# Patient Record
Sex: FEMALE | Race: OTHER | Employment: FULL TIME | URBAN - METROPOLITAN AREA
[De-identification: names, ages, dates, MRNs, and addresses within clinical notes are randomized per-mention and may not be internally consistent; named-entity substitution may affect disease eponyms.]

---

## 2017-02-26 ENCOUNTER — APPOINTMENT (OUTPATIENT)
Dept: ULTRASOUND IMAGING | Age: 22
End: 2017-02-26
Attending: EMERGENCY MEDICINE
Payer: SELF-PAY

## 2017-02-26 ENCOUNTER — HOSPITAL ENCOUNTER (EMERGENCY)
Age: 22
Discharge: HOME | End: 2017-02-26
Attending: EMERGENCY MEDICINE
Payer: SELF-PAY

## 2017-02-26 VITALS
SYSTOLIC BLOOD PRESSURE: 109 MMHG | RESPIRATION RATE: 18 BRPM | DIASTOLIC BLOOD PRESSURE: 57 MMHG | OXYGEN SATURATION: 100 % | HEART RATE: 90 BPM | HEIGHT: 63 IN | WEIGHT: 227.07 LBS | BODY MASS INDEX: 40.23 KG/M2 | TEMPERATURE: 98.1 F

## 2017-02-26 DIAGNOSIS — N83.202 CYST OF LEFT OVARY: Primary | ICD-10-CM

## 2017-02-26 LAB
ALBUMIN SERPL BCP-MCNC: 3.3 G/DL (ref 3.5–5)
ALBUMIN/GLOB SERPL: 0.8 {RATIO} (ref 1.1–2.2)
ALP SERPL-CCNC: 104 U/L (ref 45–117)
ALT SERPL-CCNC: 15 U/L (ref 12–78)
ANION GAP BLD CALC-SCNC: 11 MMOL/L (ref 5–15)
APPEARANCE UR: ABNORMAL
AST SERPL W P-5'-P-CCNC: 14 U/L (ref 15–37)
BACTERIA URNS QL MICRO: ABNORMAL /HPF
BASOPHILS # BLD AUTO: 0 K/UL (ref 0–0.1)
BASOPHILS # BLD: 0 % (ref 0–1)
BILIRUB SERPL-MCNC: 0.4 MG/DL (ref 0.2–1)
BILIRUB UR QL: NEGATIVE
BUN SERPL-MCNC: 6 MG/DL (ref 6–20)
BUN/CREAT SERPL: 9 (ref 12–20)
CALCIUM SERPL-MCNC: 8.4 MG/DL (ref 8.5–10.1)
CHLORIDE SERPL-SCNC: 105 MMOL/L (ref 97–108)
CO2 SERPL-SCNC: 26 MMOL/L (ref 21–32)
COLOR UR: ABNORMAL
CREAT SERPL-MCNC: 0.69 MG/DL (ref 0.55–1.02)
EOSINOPHIL # BLD: 0.8 K/UL (ref 0–0.4)
EOSINOPHIL NFR BLD: 8 % (ref 0–7)
EPITH CASTS URNS QL MICRO: ABNORMAL /LPF
ERYTHROCYTE [DISTWIDTH] IN BLOOD BY AUTOMATED COUNT: 15.9 % (ref 11.5–14.5)
GLOBULIN SER CALC-MCNC: 4.1 G/DL (ref 2–4)
GLUCOSE SERPL-MCNC: 88 MG/DL (ref 65–100)
GLUCOSE UR STRIP.AUTO-MCNC: NEGATIVE MG/DL
HCG UR QL: NEGATIVE
HCT VFR BLD AUTO: 32.4 % (ref 35–47)
HGB BLD-MCNC: 10.2 G/DL (ref 11.5–16)
HGB UR QL STRIP: NEGATIVE
KETONES UR QL STRIP.AUTO: NEGATIVE MG/DL
LEUKOCYTE ESTERASE UR QL STRIP.AUTO: NEGATIVE
LYMPHOCYTES # BLD AUTO: 19 % (ref 12–49)
LYMPHOCYTES # BLD: 1.9 K/UL (ref 0.8–3.5)
MCH RBC QN AUTO: 24.2 PG (ref 26–34)
MCHC RBC AUTO-ENTMCNC: 31.5 G/DL (ref 30–36.5)
MCV RBC AUTO: 77 FL (ref 80–99)
MONOCYTES # BLD: 0.5 K/UL (ref 0–1)
MONOCYTES NFR BLD AUTO: 6 % (ref 5–13)
NEUTS SEG # BLD: 6.6 K/UL (ref 1.8–8)
NEUTS SEG NFR BLD AUTO: 67 % (ref 32–75)
NITRITE UR QL STRIP.AUTO: NEGATIVE
PH UR STRIP: 6 [PH] (ref 5–8)
PLATELET # BLD AUTO: 481 K/UL (ref 150–400)
POTASSIUM SERPL-SCNC: 3.7 MMOL/L (ref 3.5–5.1)
PROT SERPL-MCNC: 7.4 G/DL (ref 6.4–8.2)
PROT UR STRIP-MCNC: NEGATIVE MG/DL
RBC # BLD AUTO: 4.21 M/UL (ref 3.8–5.2)
RBC #/AREA URNS HPF: ABNORMAL /HPF (ref 0–5)
SODIUM SERPL-SCNC: 142 MMOL/L (ref 136–145)
SP GR UR REFRACTOMETRY: 1.02 (ref 1–1.03)
UA: UC IF INDICATED,UAUC: ABNORMAL
UROBILINOGEN UR QL STRIP.AUTO: 1 EU/DL (ref 0.2–1)
WBC # BLD AUTO: 9.8 K/UL (ref 3.6–11)
WBC URNS QL MICRO: ABNORMAL /HPF (ref 0–4)

## 2017-02-26 PROCEDURE — 87086 URINE CULTURE/COLONY COUNT: CPT | Performed by: EMERGENCY MEDICINE

## 2017-02-26 PROCEDURE — 36415 COLL VENOUS BLD VENIPUNCTURE: CPT | Performed by: EMERGENCY MEDICINE

## 2017-02-26 PROCEDURE — 99283 EMERGENCY DEPT VISIT LOW MDM: CPT

## 2017-02-26 PROCEDURE — 81025 URINE PREGNANCY TEST: CPT | Performed by: EMERGENCY MEDICINE

## 2017-02-26 PROCEDURE — 81001 URINALYSIS AUTO W/SCOPE: CPT | Performed by: EMERGENCY MEDICINE

## 2017-02-26 PROCEDURE — 80053 COMPREHEN METABOLIC PANEL: CPT | Performed by: EMERGENCY MEDICINE

## 2017-02-26 PROCEDURE — 85025 COMPLETE CBC W/AUTO DIFF WBC: CPT | Performed by: EMERGENCY MEDICINE

## 2017-02-26 PROCEDURE — 76830 TRANSVAGINAL US NON-OB: CPT

## 2017-02-26 PROCEDURE — 76856 US EXAM PELVIC COMPLETE: CPT

## 2017-02-26 RX ORDER — IBUPROFEN 800 MG/1
800 TABLET ORAL
Qty: 20 TAB | Refills: 0 | Status: SHIPPED | OUTPATIENT
Start: 2017-02-26 | End: 2017-02-26

## 2017-02-26 RX ORDER — IBUPROFEN 800 MG/1
800 TABLET ORAL
Qty: 20 TAB | Refills: 0 | Status: SHIPPED | OUTPATIENT
Start: 2017-02-26 | End: 2017-03-05

## 2017-02-26 RX ORDER — HYDROCODONE BITARTRATE AND ACETAMINOPHEN 5; 325 MG/1; MG/1
1 TABLET ORAL
Qty: 10 TAB | Refills: 0 | Status: SHIPPED | OUTPATIENT
Start: 2017-02-26 | End: 2017-04-12 | Stop reason: ALTCHOICE

## 2017-02-26 NOTE — ED NOTES
Dr. Karissa German reviewed discharge instructions with the patient. The patient verbalized understanding. All questions and concerns were addressed. The patient declined a wheelchair and is discharged ambulatory in the care of family members with instructions and prescriptions in hand. Pt is alert and oriented x 4. Respirations are clear and unlabored.

## 2017-02-26 NOTE — ED PROVIDER NOTES
HPI Comments: Rachael Ventura is a 25 y.o. female with history significant for ovarian cysts presenting ambulatory to [facility] ED with c/o sudden onset of pain to the abdomen. Per pt, she was at the end of her shift at work when she experienced pain to the left suprapubic region with a moderate 6/10 intensity. Pt reports history of ovarian cysts back when she was pregnant but informs that the pain associated to the cyst disappeared in 09/2016 when she delivered her baby. Pt informs her OBGYN stated that the pain associated with the cyst may disappear once she delivered and would be of little concern. Pt informs the current pain presents in the same manner to the left suprapubic region as her previous onset of ovarian cyst pain. Pt states she currently is in no pain informing that the pain resolved en route with no interventions. Pt expresses concern of returned ovarian cyst. Pt specifically denies any nausea, vomiting, fevers, chills, urinary complications, chest pain, or SOB. PCP: None    PSHx: tonsillectomy, adenoidectomy  Social Hx: - EtOH; - Smoker; - Illicit Drugs    There are no other changes, complaints or physical findings at this time. The history is provided by the patient. Past Medical History:   Diagnosis Date    Abdominal pain, other specified site     Keratosis pilaris 10/1/2009    Otitis media      Past Surgical History:   Procedure Laterality Date    HX ADENOIDECTOMY      HX HEENT      tubal/tonsils and adnoids    HX TONSILLECTOMY      HX TYMPANOSTOMY       History reviewed. No pertinent family history. Social History     Social History    Marital status: SINGLE     Spouse name: N/A    Number of children: N/A    Years of education: N/A     Occupational History    Not on file.      Social History Main Topics    Smoking status: Never Smoker    Smokeless tobacco: Never Used    Alcohol use No    Drug use: No    Sexual activity: No     Other Topics Concern    Not on file Social History Narrative     ALLERGIES: Review of patient's allergies indicates no known allergies. Review of Systems   Constitutional: Negative for chills and fever. HENT: Negative for congestion, rhinorrhea, sneezing and sore throat. Eyes: Negative for redness and visual disturbance. Respiratory: Negative for shortness of breath. Cardiovascular: Negative for leg swelling. Gastrointestinal: Positive for abdominal pain. Negative for nausea and vomiting. Genitourinary: Negative for difficulty urinating and frequency. Musculoskeletal: Negative for back pain, myalgias and neck stiffness. Skin: Negative for rash. Neurological: Negative for dizziness, syncope, weakness and headaches. Hematological: Negative for adenopathy. Patient Vitals for the past 12 hrs:   Temp Pulse Resp BP SpO2   02/26/17 1754 - 90 18 109/57 100 %   02/26/17 1519 - 98 - 107/64 99 %   02/26/17 1405 98.1 °F (36.7 °C) 94 18 144/81 100 %     Physical Exam   Constitutional: She is oriented to person, place, and time. She appears well-developed and well-nourished. HENT:   Head: Normocephalic and atraumatic. Mouth/Throat: Oropharynx is clear and moist.   Eyes: Conjunctivae and EOM are normal.   Neck: Normal range of motion and full passive range of motion without pain. Neck supple. Cardiovascular: Normal rate, regular rhythm, S1 normal, S2 normal, normal heart sounds, intact distal pulses and normal pulses. No murmur heard. Pulmonary/Chest: Effort normal and breath sounds normal. No respiratory distress. She has no wheezes. Abdominal: Soft. Normal appearance and bowel sounds are normal. She exhibits no distension. There is tenderness (L suprapubic/pelvic). There is no rebound. Musculoskeletal: Normal range of motion. Neurological: She is alert and oriented to person, place, and time. She has normal strength. Skin: Skin is warm, dry and intact. No rash noted.    Psychiatric: She has a normal mood and affect. Her speech is normal and behavior is normal. Judgment and thought content normal.   Nursing note and vitals reviewed. MDM  Number of Diagnoses or Management Options  Cyst of left ovary:   Diagnosis management comments: DDx: constipation, ovarian cysts, ovarian torsion       Amount and/or Complexity of Data Reviewed  Clinical lab tests: ordered and reviewed  Tests in the radiology section of CPT®: ordered and reviewed  Review and summarize past medical records: yes    Patient Progress  Patient progress: stable    ED Course     Procedures     Progress Note:   6:05 PM  Pt called back to inquire about preferred pharmacy as the prescription to the one on file was not able to be e-prescribed. Written by Ada Yo ED Scribe, as dictated by Elfego Zamarripa MD.     Pt states that her pain is better. Informed of negative results. Will discharge with OB follow up. Pt agrees with plan.     LABORATORY TESTS:  Recent Results (from the past 12 hour(s))   HCG URINE, QL    Collection Time: 02/26/17  2:18 PM   Result Value Ref Range    HCG urine, Ql. NEGATIVE  NEG     URINALYSIS W/ REFLEX CULTURE    Collection Time: 02/26/17  2:18 PM   Result Value Ref Range    Color YELLOW/STRAW      Appearance CLOUDY (A) CLEAR      Specific gravity 1.023 1.003 - 1.030      pH (UA) 6.0 5.0 - 8.0      Protein NEGATIVE  NEG mg/dL    Glucose NEGATIVE  NEG mg/dL    Ketone NEGATIVE  NEG mg/dL    Bilirubin NEGATIVE  NEG      Blood NEGATIVE  NEG      Urobilinogen 1.0 0.2 - 1.0 EU/dL    Nitrites NEGATIVE  NEG      Leukocyte Esterase NEGATIVE  NEG      WBC 0-4 0 - 4 /hpf    RBC 0-5 0 - 5 /hpf    Epithelial cells FEW FEW /lpf    Bacteria 1+ (A) NEG /hpf    UA:UC IF INDICATED URINE CULTURE ORDERED (A) CNI     METABOLIC PANEL, COMPREHENSIVE    Collection Time: 02/26/17  3:05 PM   Result Value Ref Range    Sodium 142 136 - 145 mmol/L    Potassium 3.7 3.5 - 5.1 mmol/L    Chloride 105 97 - 108 mmol/L    CO2 26 21 - 32 mmol/L    Anion gap 11 5 - 15 mmol/L    Glucose 88 65 - 100 mg/dL    BUN 6 6 - 20 MG/DL    Creatinine 0.69 0.55 - 1.02 MG/DL    BUN/Creatinine ratio 9 (L) 12 - 20      GFR est AA >60 >60 ml/min/1.73m2    GFR est non-AA >60 >60 ml/min/1.73m2    Calcium 8.4 (L) 8.5 - 10.1 MG/DL    Bilirubin, total 0.4 0.2 - 1.0 MG/DL    ALT (SGPT) 15 12 - 78 U/L    AST (SGOT) 14 (L) 15 - 37 U/L    Alk. phosphatase 104 45 - 117 U/L    Protein, total 7.4 6.4 - 8.2 g/dL    Albumin 3.3 (L) 3.5 - 5.0 g/dL    Globulin 4.1 (H) 2.0 - 4.0 g/dL    A-G Ratio 0.8 (L) 1.1 - 2.2     CBC WITH AUTOMATED DIFF    Collection Time: 02/26/17  3:05 PM   Result Value Ref Range    WBC 9.8 3.6 - 11.0 K/uL    RBC 4.21 3.80 - 5.20 M/uL    HGB 10.2 (L) 11.5 - 16.0 g/dL    HCT 32.4 (L) 35.0 - 47.0 %    MCV 77.0 (L) 80.0 - 99.0 FL    MCH 24.2 (L) 26.0 - 34.0 PG    MCHC 31.5 30.0 - 36.5 g/dL    RDW 15.9 (H) 11.5 - 14.5 %    PLATELET 893 (H) 305 - 400 K/uL    NEUTROPHILS 67 32 - 75 %    LYMPHOCYTES 19 12 - 49 %    MONOCYTES 6 5 - 13 %    EOSINOPHILS 8 (H) 0 - 7 %    BASOPHILS 0 0 - 1 %    ABS. NEUTROPHILS 6.6 1.8 - 8.0 K/UL    ABS. LYMPHOCYTES 1.9 0.8 - 3.5 K/UL    ABS. MONOCYTES 0.5 0.0 - 1.0 K/UL    ABS. EOSINOPHILS 0.8 (H) 0.0 - 0.4 K/UL    ABS. BASOPHILS 0.0 0.0 - 0.1 K/UL     IMAGING RESULTS:  Medical indication: Left suprapubic pain, history of ovarian cyst.     Transabdominal and transvaginal pelvic sonography performed. The uterus measured  9.7 x 2.7 x 4 cm, the stripe is 7 mm, no mass. The right ovary measures 2.8 x  1.9 x 2.9 cm and contains follicles the largest one measuring 1.2 cm. There is a  cystic lesion in the left adnexa likely arising from the left ovary measured 13  x 7 x 10 cm. No ovarian parenchyma is delineated. Trace of free fluid.     IMPRESSION  impression: Large left adnexal cystic lesion. Medical indication: Left suprapubic pain, history of ovarian cyst.     Transabdominal and transvaginal pelvic sonography performed.  The uterus measured  9.7 x 2.7 x 4 cm, the stripe is 7 mm, no mass. The right ovary measures 2.8 x  1.9 x 2.9 cm and contains follicles the largest one measuring 1.2 cm. There is a  cystic lesion in the left adnexa likely arising from the left ovary measured 13  x 7 x 10 cm. No ovarian parenchyma is delineated. Trace of free fluid.     IMPRESSION  impression: Large left adnexal cystic lesion.         IMPRESSION:  1. Cyst of left ovary      PLAN:  1. Discharge Medication List as of 2/26/2017  5:46 PM      START taking these medications    Details   HYDROcodone-acetaminophen (NORCO) 5-325 mg per tablet Take 1 Tab by mouth every four (4) hours as needed for Pain. Max Daily Amount: 6 Tabs., Print, Disp-10 Tab, R-0      ibuprofen (MOTRIN) 800 mg tablet Take 1 Tab by mouth every six (6) hours as needed for Pain for up to 7 days. , Normal, Disp-20 Tab, R-0           2. Follow-up Information     Follow up With Details Comments Contact Info    Gracie Borrero MD Call or your OB at 14 [facility]  [address] [address] [phone]      [facility] EMERGENCY DEPT  As needed, If symptoms worsen [address] [place] [address]  [phone]        Return to ED if worse     DISCHARGE NOTE:    5:44 PM  The patient is ready for discharge. The patient signs, symptoms, diagnosis, and discharge instructions have been discussed and the patient has conveyed their understanding. The patient is to follow-up as reccommended or returned to the ER should their symptoms worsen. Plan has been discussed and patient is in agreement. This note is prepared by Dimas Mcmillan acting as Scribe for Broderick Barton MD.    Broderick Barton MD: This scribe's documentation has been prepared under my direction and personally reviewed by me in its entirety. I confirm that the note above accurately reflects all work, treatment procedures and medical decision makings performed by me.

## 2017-02-26 NOTE — LETTER
NOTIFICATION RETURN TO WORK 
 
2/26/2017 5:56 PM 
 
Ms. Tevin Holguin [address] [phone] To Whom It May Concern: 
 
Tevin Holguin is currently under the care of [facility] EMERGENCY DEPT. She will return to work on: 02/28/2017 If there are questions or concerns please have the patient contact our office.  
 
 
 
Sincerely, 
 
 
 
 
 
Marisol Hsu MD

## 2017-02-26 NOTE — ED NOTES
Pt ambulatory from triage with c/o episode of LLQ abdominal pain starting earlier today. Pt currently denies pain. Pt states PMH of L ovarian cyst. Pt denies n/v/d, urinary s/s and all other complaints. Pt in position of comfort with no signs of acute distress noted.

## 2017-02-26 NOTE — DISCHARGE INSTRUCTIONS
Functional Ovarian Cyst: Care Instructions  Your Care Instructions    A functional ovarian cyst is a sac that forms on the surface of a woman's ovary during ovulation. The sac holds a maturing egg. Usually the sac goes away after the egg is released. But if the egg is not released, or if the sac closes up after the egg is released, the sac can swell up with fluid and form a cyst.  Functional ovarian cysts are different than ovarian growths caused by other problems, such as cancer. Most functional ovarian cysts cause no symptoms and go away on their own. Some cause mild pain. Others can cause severe pain when they rupture or bleed. Follow-up care is a key part of your treatment and safety. Be sure to make and go to all appointments, and call your doctor if you are having problems. It's also a good idea to know your test results and keep a list of the medicines you take. How can you care for yourself at home? · Use heat, such as a hot water bottle, a heating pad set on low, or a warm bath, to relax tense muscles and relieve cramping. · Take pain medicines exactly as directed. ¨ If the doctor gave you a prescription medicine for pain, take it as prescribed. ¨ If you are not taking a prescription pain medicine, ask your doctor if you can take an over-the-counter medicine. · Avoid constipation. Make sure you drink enough fluids and include fruits, vegetables, and fiber in your diet each day. Constipation does not cause ovarian cysts, but it may make your pelvic pain worse. When should you call for help? Call 911 anytime you think you may need emergency care. For example, call if:  · You passed out (lost consciousness). · You have sudden, severe pain in your belly or your pelvis. Call your doctor now or seek immediate medical care if:  · You have new belly or pelvic pain, or your pain gets worse. · You have severe vaginal bleeding.  This means that you are soaking through your usual pads or tampons each hour for 2 or more hours. · You are dizzy or lightheaded, or you feel like you may faint. · You have pain or bleeding during or after sex. Watch closely for changes in your health, and be sure to contact your doctor if:  · Your pain keeps you from doing the things that you enjoy. · You do not get better as expected. Where can you learn more? Go to http://sohail-geoffrey.info/. Enter R383 in the search box to learn more about \"Functional Ovarian Cyst: Care Instructions. \"  Current as of: February 25, 2016  Content Version: 11.1  © 3882-4848 [employer]. Care instructions adapted under license by [employer] (which disclaims liability or warranty for this information). If you have questions about a medical condition or this instruction, always ask your healthcare professional. [address] any warranty or liability for your use of this information.

## 2017-02-26 NOTE — Clinical Note
Local [facility] Physicians [address] [place] [address] Physicians [phone] Zheng Olmstead, MD Dick Alicea, MD Mello Wilder MD 
 
[facility] [phone] Odilia Lee, P Lise Schlatter, MD Sung Roe, MD Althia Perkins, MD [address] [phone] MD Jacki Patel MD  
 
[facility] [phone] Jj Stoddard, MD Brittany Barker, MD Roger Marcano, MD Krupa Arreola, MD ABRAMS [facility] [phone] Luz Maria Fraga, MD Damon Bedolla, MD Melvin Gongora, NP [address] [phone] Saintair Aburto, MD Kyle Cardoso, MD Gregoira Enriquez, MD Arlyn Cox, MD Ingrid Samuel, MD Jonn Hernandez, MD [facility], [address] [facility] [facility] [phone] Howard Dugan MD 
 
[facility] [phone] MD Lon Chahal, NP Gillie Sandifer, MD Rebecca Gilliland, MD Delbert Zee MD [facility] [place] [phone] Linnea Gunn, MD Burgess Roberto, P Deonna Jc, KISHAN Latham, MD Simone Chatman, MD Garland Dorantes MD EPHRAIM [facility] [phone] MD Carina Matthews, MD Natalia Babin MD Kendell Levering, MD  
 
[facility] [phone] MD Mesfin Gambino MD 
 
 [facility] [phone] MD Donnell Ventura MD Jacelyn Shropshire, MD  
 
[facility] [phone] MD Emeka Giang, MD Leim Butler, MD Elysa Hides, MD Raenette Combes, MD Darylene Deaner, NP Holger Hernández MD  
 
[phone]  [phone] MD Ibis Nam MD  
 
[address]102 [facility] MD Sarahy Last, ALEX Rojas, ALEX Hopkins MD Fortunato Gey, KISHAN [facility] [place], [facility] For adult and child immunizations, family planning, TB screening, STD testing and women's health services. [facility]: 
[place]  [phone] [employer]  [phone] [facility]  [phone] [place]   [phone] Eleonore Shade   [phone] [place]: 
[facility]  [phone] [place] [phone] [place]  [phone] [address] For primary care servi gavin, woman and child wellness, and some clinics providing specialty care. [facility] -- [facility].  
[address] [place] [address]  [phone] [facility]EN[facility] 200 [facility] [phone] [address] [place] [address]   [phone] [address] [place] [address] Drive [address] [place] [address]    [phone] [facility] [address]  [phone] [address]  [phone] Cinthya Sesay [facility] [address] [facility], [address] [place] [address] [facility]: 
[facility] [address], [address] [phone] [facility], [address] [phone] [place] [address] [phone] [facility] [address] [employer] [employer] [address], [phone] 3550 [facility] ([email or web address]) [phone] Sexual Health/Woman Wellness Clinics For STD/HIV testing and treatment, pregnancy testing and services, men's health, birth cont rol services, LGBT services, and hepatitis/HPV vaccine services. [facility]hinav [facility] Melodie for [place] [employer] [address] [facility] [phone] [facility] of [address] Isabell Hernandez [phone] [address] Hernandez [phone] [facility]' 401 [facility], 5th floor [phone] [facility] of 1065 [facility] 427 [facility] [address] [facility] [address] Brand[place] Seun [phone] Specialty Service Clinics 4200 [facility] [phone] 6655 [facility]   [phone] [place]   [phone] Women, Infant and Children's Services:  
[address] [phone] [address] [phone] 6161 [facility]- [phone] [address] [facility] Psychiatry     [phone] [facility]5 [facility]   [phone] 56 [facility] [phone] Miscellaneous: Thank you for allowing us to provide y ou with excellent care today. We hope we addressed all of your concerns and needs. We strive to provide excellent quality care in the Emergency Department. Please rate us as excellent, as anything less than excellent does not meet our expectations. If  you feel that you have not received excellent quality care or timely care, please ask to speak to the nurse manager. Please choose us in the future for your continued health care needs. The exam and treatment you received in the Emergency Department  were for an urgent problem and are not intended as complete care. It is important that you follow up with a doctor, nurse practitioner, or physician assistant for ongoing care.  If your symptoms become worse or you do not improve as expected and you are u ham[place] to reach your usual health care provider, you should return to the Emergency Department. We are available 24 hours a day. Take this sheet with you when you go to your follow-up visit. If you have any problem arranging the follow-up visit, conta ct the Emergency Department immediately. If a prescription has been provided, please have it filled as soon as possible to avoid a delay in treatment. Read the entire medication instruction sheet provided to you by the pharmacy. If you have any questio ns or reservations about taking the medication due to side effects or interactions with other medications please call the ER or your primary care physician. Take this sheet with you when you go to your follow-up visit. Make an appointment with yo  family doctor or the physician you were referred to for follow-up of this visit, as this is mandatory follow-up. Return to the ER if you are unable to be seen or if you are unable to be seen in a timely manner. If you have any problem arranging th e follow-up visit, contact the Emergency Department immediately.

## 2017-02-28 LAB
BACTERIA SPEC CULT: NORMAL
CC UR VC: NORMAL
SERVICE CMNT-IMP: NORMAL

## 2017-04-03 ENCOUNTER — HOSPITAL ENCOUNTER (OUTPATIENT)
Age: 22
Setting detail: OBSERVATION
Discharge: HOME | End: 2017-04-06
Attending: EMERGENCY MEDICINE | Admitting: OBSTETRICS & GYNECOLOGY
Payer: SELF-PAY

## 2017-04-03 ENCOUNTER — APPOINTMENT (OUTPATIENT)
Dept: ULTRASOUND IMAGING | Age: 22
End: 2017-04-03
Attending: PHYSICIAN ASSISTANT
Payer: SELF-PAY

## 2017-04-03 DIAGNOSIS — R10.2 PELVIC PAIN: ICD-10-CM

## 2017-04-03 DIAGNOSIS — N83.202 LEFT OVARIAN CYST: Primary | ICD-10-CM

## 2017-04-03 DIAGNOSIS — N83.519 OVARIAN TORSION: ICD-10-CM

## 2017-04-03 LAB
ALBUMIN SERPL BCP-MCNC: 3.7 G/DL (ref 3.5–5)
ALBUMIN/GLOB SERPL: 0.9 {RATIO} (ref 1.1–2.2)
ALP SERPL-CCNC: 91 U/L (ref 45–117)
ALT SERPL-CCNC: 16 U/L (ref 12–78)
ANION GAP BLD CALC-SCNC: 11 MMOL/L (ref 5–15)
APPEARANCE UR: ABNORMAL
AST SERPL W P-5'-P-CCNC: 17 U/L (ref 15–37)
BACTERIA URNS QL MICRO: ABNORMAL /HPF
BASOPHILS # BLD AUTO: 0 K/UL (ref 0–0.1)
BASOPHILS # BLD: 0 % (ref 0–1)
BILIRUB SERPL-MCNC: 0.4 MG/DL (ref 0.2–1)
BILIRUB UR QL: NEGATIVE
BUN SERPL-MCNC: 12 MG/DL (ref 6–20)
BUN/CREAT SERPL: 16 (ref 12–20)
CALCIUM SERPL-MCNC: 8.6 MG/DL (ref 8.5–10.1)
CHLORIDE SERPL-SCNC: 103 MMOL/L (ref 97–108)
CLUE CELLS VAG QL WET PREP: NORMAL
CO2 SERPL-SCNC: 24 MMOL/L (ref 21–32)
COLOR UR: ABNORMAL
CREAT SERPL-MCNC: 0.75 MG/DL (ref 0.55–1.02)
EOSINOPHIL # BLD: 0.1 K/UL (ref 0–0.4)
EOSINOPHIL NFR BLD: 0 % (ref 0–7)
EPITH CASTS URNS QL MICRO: ABNORMAL /LPF
ERYTHROCYTE [DISTWIDTH] IN BLOOD BY AUTOMATED COUNT: 15.6 % (ref 11.5–14.5)
GLOBULIN SER CALC-MCNC: 3.9 G/DL (ref 2–4)
GLUCOSE SERPL-MCNC: 106 MG/DL (ref 65–100)
GLUCOSE UR STRIP.AUTO-MCNC: NEGATIVE MG/DL
HCG UR QL: NEGATIVE
HCT VFR BLD AUTO: 33.6 % (ref 35–47)
HGB BLD-MCNC: 10.8 G/DL (ref 11.5–16)
HGB UR QL STRIP: NEGATIVE
KETONES UR QL STRIP.AUTO: NEGATIVE MG/DL
KOH PREP SPEC: NORMAL
LACTATE SERPL-SCNC: 1.6 MMOL/L (ref 0.4–2)
LEUKOCYTE ESTERASE UR QL STRIP.AUTO: NEGATIVE
LYMPHOCYTES # BLD AUTO: 5 % (ref 12–49)
LYMPHOCYTES # BLD: 0.9 K/UL (ref 0.8–3.5)
MCH RBC QN AUTO: 24.4 PG (ref 26–34)
MCHC RBC AUTO-ENTMCNC: 32.1 G/DL (ref 30–36.5)
MCV RBC AUTO: 76 FL (ref 80–99)
MONOCYTES # BLD: 0.3 K/UL (ref 0–1)
MONOCYTES NFR BLD AUTO: 2 % (ref 5–13)
NEUTS SEG # BLD: 17.1 K/UL (ref 1.8–8)
NEUTS SEG NFR BLD AUTO: 93 % (ref 32–75)
NITRITE UR QL STRIP.AUTO: NEGATIVE
PH UR STRIP: 7 [PH] (ref 5–8)
PLATELET # BLD AUTO: 462 K/UL (ref 150–400)
POTASSIUM SERPL-SCNC: 3.9 MMOL/L (ref 3.5–5.1)
PROT SERPL-MCNC: 7.6 G/DL (ref 6.4–8.2)
PROT UR STRIP-MCNC: NEGATIVE MG/DL
RBC # BLD AUTO: 4.42 M/UL (ref 3.8–5.2)
RBC #/AREA URNS HPF: ABNORMAL /HPF (ref 0–5)
SERVICE CMNT-IMP: NORMAL
SODIUM SERPL-SCNC: 138 MMOL/L (ref 136–145)
SP GR UR REFRACTOMETRY: 1.02 (ref 1–1.03)
T VAGINALIS VAG QL WET PREP: NORMAL
UROBILINOGEN UR QL STRIP.AUTO: 1 EU/DL (ref 0.2–1)
WBC # BLD AUTO: 18.5 K/UL (ref 3.6–11)
WBC URNS QL MICRO: ABNORMAL /HPF (ref 0–4)

## 2017-04-03 PROCEDURE — 74011250636 HC RX REV CODE- 250/636: Performed by: PHYSICIAN ASSISTANT

## 2017-04-03 PROCEDURE — 87210 SMEAR WET MOUNT SALINE/INK: CPT | Performed by: PHYSICIAN ASSISTANT

## 2017-04-03 PROCEDURE — 87077 CULTURE AEROBIC IDENTIFY: CPT | Performed by: PHYSICIAN ASSISTANT

## 2017-04-03 PROCEDURE — 36415 COLL VENOUS BLD VENIPUNCTURE: CPT | Performed by: PHYSICIAN ASSISTANT

## 2017-04-03 PROCEDURE — 99285 EMERGENCY DEPT VISIT HI MDM: CPT

## 2017-04-03 PROCEDURE — 96372 THER/PROPH/DIAG INJ SC/IM: CPT

## 2017-04-03 PROCEDURE — 99218 HC RM OBSERVATION: CPT

## 2017-04-03 PROCEDURE — 96361 HYDRATE IV INFUSION ADD-ON: CPT

## 2017-04-03 PROCEDURE — 74011250637 HC RX REV CODE- 250/637: Performed by: PHYSICIAN ASSISTANT

## 2017-04-03 PROCEDURE — 87086 URINE CULTURE/COLONY COUNT: CPT | Performed by: PHYSICIAN ASSISTANT

## 2017-04-03 PROCEDURE — 87491 CHLMYD TRACH DNA AMP PROBE: CPT | Performed by: PHYSICIAN ASSISTANT

## 2017-04-03 PROCEDURE — 81001 URINALYSIS AUTO W/SCOPE: CPT | Performed by: PHYSICIAN ASSISTANT

## 2017-04-03 PROCEDURE — 76856 US EXAM PELVIC COMPLETE: CPT

## 2017-04-03 PROCEDURE — 83605 ASSAY OF LACTIC ACID: CPT | Performed by: PHYSICIAN ASSISTANT

## 2017-04-03 PROCEDURE — 81025 URINE PREGNANCY TEST: CPT

## 2017-04-03 PROCEDURE — 96374 THER/PROPH/DIAG INJ IV PUSH: CPT

## 2017-04-03 PROCEDURE — 96375 TX/PRO/DX INJ NEW DRUG ADDON: CPT

## 2017-04-03 PROCEDURE — 74011250636 HC RX REV CODE- 250/636: Performed by: OBSTETRICS & GYNECOLOGY

## 2017-04-03 PROCEDURE — 80053 COMPREHEN METABOLIC PANEL: CPT | Performed by: PHYSICIAN ASSISTANT

## 2017-04-03 PROCEDURE — 87186 SC STD MICRODIL/AGAR DIL: CPT | Performed by: PHYSICIAN ASSISTANT

## 2017-04-03 PROCEDURE — 85025 COMPLETE CBC W/AUTO DIFF WBC: CPT | Performed by: PHYSICIAN ASSISTANT

## 2017-04-03 PROCEDURE — 96376 TX/PRO/DX INJ SAME DRUG ADON: CPT

## 2017-04-03 RX ORDER — HYDROMORPHONE HYDROCHLORIDE 1 MG/ML
1 INJECTION, SOLUTION INTRAMUSCULAR; INTRAVENOUS; SUBCUTANEOUS
Status: DISCONTINUED | OUTPATIENT
Start: 2017-04-03 | End: 2017-04-04

## 2017-04-03 RX ORDER — HYDROMORPHONE HYDROCHLORIDE 1 MG/ML
1 INJECTION, SOLUTION INTRAMUSCULAR; INTRAVENOUS; SUBCUTANEOUS
Status: COMPLETED | OUTPATIENT
Start: 2017-04-03 | End: 2017-04-03

## 2017-04-03 RX ORDER — OXYCODONE AND ACETAMINOPHEN 5; 325 MG/1; MG/1
1 TABLET ORAL
Status: COMPLETED | OUTPATIENT
Start: 2017-04-03 | End: 2017-04-03

## 2017-04-03 RX ORDER — ONDANSETRON 2 MG/ML
4 INJECTION INTRAMUSCULAR; INTRAVENOUS
Status: DISCONTINUED | OUTPATIENT
Start: 2017-04-03 | End: 2017-04-04

## 2017-04-03 RX ORDER — SODIUM CHLORIDE, SODIUM LACTATE, POTASSIUM CHLORIDE, CALCIUM CHLORIDE 600; 310; 30; 20 MG/100ML; MG/100ML; MG/100ML; MG/100ML
125 INJECTION, SOLUTION INTRAVENOUS CONTINUOUS
Status: DISPENSED | OUTPATIENT
Start: 2017-04-03 | End: 2017-04-04

## 2017-04-03 RX ORDER — DIPHENHYDRAMINE HYDROCHLORIDE 50 MG/ML
12.5 INJECTION, SOLUTION INTRAMUSCULAR; INTRAVENOUS
Status: DISCONTINUED | OUTPATIENT
Start: 2017-04-03 | End: 2017-04-04

## 2017-04-03 RX ORDER — SODIUM CHLORIDE 0.9 % (FLUSH) 0.9 %
5-10 SYRINGE (ML) INJECTION EVERY 8 HOURS
Status: DISCONTINUED | OUTPATIENT
Start: 2017-04-03 | End: 2017-04-06 | Stop reason: HOSPADM

## 2017-04-03 RX ORDER — KETOROLAC TROMETHAMINE 30 MG/ML
60 INJECTION, SOLUTION INTRAMUSCULAR; INTRAVENOUS
Status: COMPLETED | OUTPATIENT
Start: 2017-04-03 | End: 2017-04-03

## 2017-04-03 RX ORDER — ONDANSETRON 2 MG/ML
4 INJECTION INTRAMUSCULAR; INTRAVENOUS
Status: COMPLETED | OUTPATIENT
Start: 2017-04-03 | End: 2017-04-03

## 2017-04-03 RX ORDER — ONDANSETRON 4 MG/1
4 TABLET, ORALLY DISINTEGRATING ORAL
Status: COMPLETED | OUTPATIENT
Start: 2017-04-03 | End: 2017-04-03

## 2017-04-03 RX ORDER — SODIUM CHLORIDE 0.9 % (FLUSH) 0.9 %
5-10 SYRINGE (ML) INJECTION AS NEEDED
Status: DISCONTINUED | OUTPATIENT
Start: 2017-04-03 | End: 2017-04-06 | Stop reason: HOSPADM

## 2017-04-03 RX ADMIN — ONDANSETRON 4 MG: 4 TABLET, ORALLY DISINTEGRATING ORAL at 19:19

## 2017-04-03 RX ADMIN — SODIUM CHLORIDE, SODIUM LACTATE, POTASSIUM CHLORIDE, AND CALCIUM CHLORIDE 125 ML/HR: 600; 310; 30; 20 INJECTION, SOLUTION INTRAVENOUS at 23:49

## 2017-04-03 RX ADMIN — KETOROLAC TROMETHAMINE 60 MG: 30 INJECTION, SOLUTION INTRAMUSCULAR at 19:19

## 2017-04-03 RX ADMIN — SODIUM CHLORIDE 1000 ML: 900 INJECTION, SOLUTION INTRAVENOUS at 22:31

## 2017-04-03 RX ADMIN — HYDROMORPHONE HYDROCHLORIDE 1 MG: 1 INJECTION, SOLUTION INTRAMUSCULAR; INTRAVENOUS; SUBCUTANEOUS at 22:31

## 2017-04-03 RX ADMIN — OXYCODONE HYDROCHLORIDE AND ACETAMINOPHEN 1 TABLET: 5; 325 TABLET ORAL at 21:37

## 2017-04-03 RX ADMIN — ONDANSETRON HYDROCHLORIDE 4 MG: 2 INJECTION, SOLUTION INTRAMUSCULAR; INTRAVENOUS at 22:31

## 2017-04-03 NOTE — IP AVS SNAPSHOT
Current Discharge Medication List  
  
START taking these medications Dose & Instructions Dispensing Information Comments Morning Noon Evening Bedtime  
 oxyCODONE-acetaminophen 5-325 mg per tablet Commonly known as:  PERCOCET Your last dose was: Your next dose is:    
   
   
 Dose:  1 Tab Take 1 Tab by mouth every four (4) hours as needed. Max Daily Amount: 6 Tabs. Quantity:  12 Tab Refills:  0 ASK your doctor about these medications Dose & Instructions Dispensing Information Comments Morning Noon Evening Bedtime HYDROcodone-acetaminophen 5-325 mg per tablet Commonly known as:  Bobole Dakins Your last dose was: Your next dose is:    
   
   
 Dose:  1 Tab Take 1 Tab by mouth every four (4) hours as needed for Pain. Max Daily Amount: 6 Tabs. Quantity:  10 Tab Refills:  0 Where to Get Your Medications Information on where to get these meds will be given to you by the nurse or doctor. ! Ask your nurse or doctor about these medications  
  oxyCODONE-acetaminophen 5-325 mg per tablet

## 2017-04-03 NOTE — ED PROVIDER NOTES
HPI Comments: Nelia Herbert is a 25 y.o. female A1(still born at 10 months gestation) who was seen in ED on , diagnosed with a L ovarian cyst and Rx'd Norco and ibuprofen who presents ambulatory to the ED c/o L sided pelvic pain and nausea x last night. Pt reports her pain has progressively worsened since onset, and states at time of exam that her sx's are similar to when she was in the ED in February. Pt endorses exacerbation of abd pain with movement and reports dry heaving when the pain worsens but no vomiting. She notes her pain usually occurs right before her menstrual cycle, which her LMP was on 3/13. She reports having had a stillbirth at 10 months pregnant last September and states no prior hx of ovarian cysts prior to the Feb diagnosis. Pt denies relief in pain with Tylenol and at time of exam states \"I want morphine because it's fast-acting\". Pt denies any new sexual partners or concerns for STDs, citing her partner uses condoms for birth control and she denies pregnancy chance. Pt specifically denies F/C, constipation, diarrhea, vomiting, fever, chills, dysuria, urinary frequency or vaginal discharge/bleeding. PCP: None  OBGYN: States none; delivered stillborn at [facility]. OB name/group    Social Hx: -tobacco, -EtOH, -Illicit Drugs    There are no other complaints, changes or physical findings at this time. The history is provided by the patient. Past Medical History:   Diagnosis Date    Abdominal pain, other specified site     Keratosis pilaris 10/1/2009    Otitis media        Past Surgical History:   Procedure Laterality Date    HX ADENOIDECTOMY      HX HEENT      tubal/tonsils and adnoids    HX TONSILLECTOMY      HX TYMPANOSTOMY           History reviewed. No pertinent family history. Social History     Social History    Marital status: SINGLE     Spouse name: N/A    Number of children: N/A    Years of education: N/A     Occupational History    Not on file. Social History Main Topics    Smoking status: Never Smoker    Smokeless tobacco: Never Used    Alcohol use No    Drug use: No    Sexual activity: No     Other Topics Concern    Not on file     Social History Narrative         ALLERGIES: Review of patient's allergies indicates no known allergies. Review of Systems   Constitutional: Negative. Negative for activity change, chills, fatigue and unexpected weight change. Respiratory: Negative for cough, chest tightness, shortness of breath and wheezing. Cardiovascular: Negative. Negative for chest pain and palpitations. Gastrointestinal: Positive for abdominal pain (LLQ) and nausea. Negative for diarrhea and vomiting. Genitourinary: Negative. Negative for dysuria, flank pain, frequency, hematuria, urgency, vaginal bleeding and vaginal discharge. Musculoskeletal: Negative. Negative for arthralgias, back pain, neck pain and neck stiffness. Skin: Negative. Negative for color change and rash. Neurological: Negative. Negative for dizziness, numbness and headaches. Psychiatric/Behavioral: Negative. Negative for confusion. All other systems reviewed and are negative. Patient Vitals for the past 12 hrs:   Temp Pulse Resp BP SpO2   04/03/17 1757 97.2 °F (36.2 °C) 80 20 121/58 100 %       Physical Exam   Constitutional: She is oriented to person, place, and time. She appears well-developed and well-nourished. She is active. Non-toxic appearance. She appears distressed. Elevated BMI; uncomfortable appearing   HENT:   Head: Normocephalic and atraumatic. Eyes: Conjunctivae are normal. Pupils are equal, round, and reactive to light. Right eye exhibits no discharge. Left eye exhibits no discharge. Neck: Normal range of motion and full passive range of motion without pain. Neck supple. No tracheal tenderness present. Cardiovascular: Normal rate, regular rhythm, normal heart sounds, intact distal pulses and normal pulses.   Exam reveals no gallop and no friction rub. No murmur heard. Pulmonary/Chest: Effort normal and breath sounds normal. No respiratory distress. She has no wheezes. She has no rales. She exhibits no tenderness. Abdominal: Soft. Bowel sounds are normal. She exhibits no distension. There is no rebound and no guarding. Suprapubic TTP   Genitourinary:   Genitourinary Comments: Normal external genitalia. Vagina- pink, moist without lesion. Cervix- pink, round with a closed cervical os. No CMT. R adnexal TTP; no L adnexal TTP or masses. Small amount of white adherent discharge   Musculoskeletal: Normal range of motion. She exhibits no edema or tenderness. Neurological: She is alert and oriented to person, place, and time. She has normal strength. No cranial nerve deficit or sensory deficit. Coordination normal.   Skin: Skin is warm, dry and intact. No abrasion and no rash noted. She is not diaphoretic. No erythema. Psychiatric: She has a normal mood and affect. Her speech is normal and behavior is normal. Cognition and memory are normal.   Nursing note and vitals reviewed. MDM  Number of Diagnoses or Management Options  Left ovarian cyst:   Pelvic pain:   Diagnosis management comments:   DDx:ovarian torsion, ovarian cyst, PID, BV       Amount and/or Complexity of Data Reviewed  Clinical lab tests: reviewed and ordered  Tests in the radiology section of CPT®: ordered and reviewed  Review and summarize past medical records: yes  Discuss the patient with other providers: yes (OB hospitalist)    Patient Progress  Patient progress: stable    ED Course       Procedures    Progress Note:  7:05 PM  Spoke to Radha Butts MD who recommends to do a pelvic exam and agrees with vaginal US and to treat the pt's pain with NSAID's.    Written by Kade Mckenzie ED Scribliz, as dictated by Petros Hanna    Procedure Note - Pelvic Exam:    7:40 PM  Performed by: Petros Hanna  Chaperoned by: Mortimer Muir, RN (female)  Pelvic exam was performed using bimanual and speculum. Further findings noted in physical exam.   The procedure took 1-15 minutes, and pt tolerated well. Written by CHEL Nagy, as dictated by Gracia Matthews    9:26 PM  Cannot r/o torsion based on US. Will consult OB hospitalist.  ALEX Patel    CONSULT NOTE:   9:27 PM  ALEX Patel spoke with Dr. Ziyad Ramos,   Specialty: [facility] Hospitalist  Discussed pt's hx, disposition, and available diagnostic and imaging results. Reviewed care plans. Consultant agrees with plans as outlined. I requested Dr. Ziyad Ramos to see the patient at this time. He states to give the patient a Percocet and if this does not relieve her pain he will come see and evaluate patient. Written by Gracia Matthews. Progress Note:  9:40 PM  Pt re-evaluated. Discussed plan of care with pt and recommendations of the [facility] hospitalist. Pt informed me she has vomited once since she has been here. Written by CHEL Nagy, as dictated by Gracia Matthews    Progress Note:  10:06 PM  30 min since Percocet given, patient appears more uncomfortable. Called back Dr. Ziyad Ramos and he states he will come evaluate the pt in ED within the next 5 to 10 minutes. Written by CHEL Nagy, as dictated by Gracia Matthews    Progress Note:  10:15 PM  Dr. Ziyad Ramos at bedside evaluating pt. And chaperoned during his bimanual exam. Dr. Ziyad Ramos states he will admit and recommends to give the pt Zofran and Dilaudid now.   Written by CHEL Nagy, as dictated by Gracia Matthews      LABORATORY TESTS:  Recent Results (from the past 12 hour(s))   URINALYSIS W/MICROSCOPIC    Collection Time: 04/03/17  7:24 PM   Result Value Ref Range    Color YELLOW/STRAW      Appearance CLOUDY (A) CLEAR      Specific gravity 1.020 1.003 - 1.030      pH (UA) 7.0 5.0 - 8.0      Protein NEGATIVE  NEG mg/dL    Glucose NEGATIVE  NEG mg/dL    Ketone NEGATIVE  NEG mg/dL Bilirubin NEGATIVE  NEG      Blood NEGATIVE  NEG      Urobilinogen 1.0 0.2 - 1.0 EU/dL    Nitrites NEGATIVE  NEG      Leukocyte Esterase NEGATIVE  NEG      WBC 5-10 0 - 4 /hpf    RBC 0-5 0 - 5 /hpf    Epithelial cells FEW FEW /lpf    Bacteria 3+ (A) NEG /hpf   WET PREP    Collection Time: 04/03/17  7:24 PM   Result Value Ref Range    Clue cells CLUE CELLS PRESENT      Wet prep NO TRICHOMONAS SEEN     KOH, OTHER SOURCES    Collection Time: 04/03/17  7:24 PM   Result Value Ref Range    Special Requests: NO SPECIAL REQUESTS      KOH NO YEAST SEEN     HCG URINE, QL. - POC    Collection Time: 04/03/17  7:31 PM   Result Value Ref Range    Pregnancy test,urine (POC) NEGATIVE  NEG     CBC WITH AUTOMATED DIFF    Collection Time: 04/03/17  9:44 PM   Result Value Ref Range    WBC 18.5 (H) 3.6 - 11.0 K/uL    RBC 4.42 3.80 - 5.20 M/uL    HGB 10.8 (L) 11.5 - 16.0 g/dL    HCT 33.6 (L) 35.0 - 47.0 %    MCV 76.0 (L) 80.0 - 99.0 FL    MCH 24.4 (L) 26.0 - 34.0 PG    MCHC 32.1 30.0 - 36.5 g/dL    RDW 15.6 (H) 11.5 - 14.5 %    PLATELET 680 (H) 026 - 400 K/uL    NEUTROPHILS 93 (H) 32 - 75 %    LYMPHOCYTES 5 (L) 12 - 49 %    MONOCYTES 2 (L) 5 - 13 %    EOSINOPHILS 0 0 - 7 %    BASOPHILS 0 0 - 1 %    ABS. NEUTROPHILS 17.1 (H) 1.8 - 8.0 K/UL    ABS. LYMPHOCYTES 0.9 0.8 - 3.5 K/UL    ABS. MONOCYTES 0.3 0.0 - 1.0 K/UL    ABS. EOSINOPHILS 0.1 0.0 - 0.4 K/UL    ABS. BASOPHILS 0.0 0.0 - 0.1 K/UL       IMAGING RESULTS:    US PELV NON OBS  INDICATION: History of left ovarian cyst diagnosed 2/26/2017. Presents to  emergency department with left lower quadrant pain and nausea over last 2 days. .     COMPARISON: February 26, 2017 ultrasound     EXAM: Real-time transpelvic ultrasound examination.     FINDINGS: Real-time transpelvic ultrasound evaluation was first performed  through the distended urinary bladder demonstrates. The uterus measures 8.5 x  4.5 x 2.6 cm. Endometrial stripe thickness is normal measuring 9 mm. No uterine  mass is shown. . The right ovary measures 2.8 x 1.9 x 2.0 cm without  demonstration of adnexal mass. There is a large cyst of the left ovary measuring  12.7 x 8.5 x 7.6 cm in size, similar in size to that shown previously. The large  cyst obscures visualization of normal ovarian tissue. Bill Foss No free pelvic fluid is  demonstrated.     IMPRESSION  IMPRESSION: Large left ovarian cyst again demonstrated. Bill Foss MEDICATIONS GIVEN:  Medications   HYDROmorphone (PF) (DILAUDID) injection 1 mg (not administered)   sodium chloride 0.9 % bolus infusion 1,000 mL (not administered)   ondansetron (ZOFRAN) injection 4 mg (not administered)   ketorolac (TORADOL) injection 60 mg (60 mg IntraMUSCular Given 4/3/17 1919)   ondansetron (ZOFRAN ODT) tablet 4 mg (4 mg Oral Given 4/3/17 1919)   oxyCODONE-acetaminophen (PERCOCET) 5-325 mg per tablet 1 Tab (1 Tab Oral Given 4/3/17 2137)       IMPRESSION:  1. Left ovarian cyst    2. Pelvic pain        PLAN:  1. To be admitted per OB-hospitalist    10:22 PM  Patient is being admitted to the hospital by Dr. Cheryl Avendano. The results of their tests and reasons for their admission have been discussed with them and/or available family. They convey agreement and understanding for the need to be admitted and for their admission diagnosis. Consultation has been made with the inpatient physician specialist for hospitalization. Written by CHEL Mays, as dictated by Modesto Conner. This note is prepared by Annika Mcghee, acting as Scribe for Modesto Landeros Salts: The scribe's documentation has been prepared under my direction and personally reviewed by me in its entirety. I confirm that the note above accurately reflects all work, treatment, procedures, and medical decision making performed by me. This note will not be viewable in [address]

## 2017-04-03 NOTE — IP AVS SNAPSHOT
Summary of Care Report The Summary of Care report has been created to help improve care coordination. Users with access to [employer] or [employer] (Web-based application) may access additional patient information including the Discharge Summary. If you are not currently a [employer] user and need more information, please call the number listed below in the [place] [address] section and ask to be connected with Medical Records. Facility Information Name Address Phone [address] [address] [phone] Patient Information Patient Name Sex  Kathleen Bronson ([id number]) Female 1995 Discharge Information Admitting Provider Service Area Unit Lm Shaffer MD / [address] [facility] [phone] Discharge Provider Discharge Date/Time Discharge Disposition Destination (none) 2017 (Pending) AHR (none) Patient Language Language ENGLISH [13] Hospital Problems as of 2017  Reviewed: 2017  4:20 AM by Mitchell Boone MD  
  
  
  
 Class Noted - Resolved Last Modified POA Active Problems Ovarian cyst, left Present on Admission 4/3/2017 - Present 2017 by Lm Shaffer MD Yes Entered by Lm Shaffer MD  
  * (Principal)Torsion of ovary, ovarian pedicle and fallopian tube Present on Admission 2017 - Present 2017 by Lm Shaffer MD Yes Entered by Lm Shaffer MD  
  
Non-Hospital Problems as of 2017  Reviewed: 2017  4:20 AM by Mitchell Boone MD  
  
  
  
 Class Noted - Resolved Last Modified Active Problems Abdominal pain, unspecified site  10/1/2009 - Present 10/1/2009 by Dianne Meeks Entered by Dianne Meeks Overview Signed 10/1/2009  4:08 PM by Judit adams Keratosis Pilaris  10/1/2009 - Present 10/1/2009 by Tamir Pal Entered by Tamir Pal You are allergic to the following No active allergies Current Discharge Medication List  
  
START taking these medications Dose & Instructions Dispensing Information Comments  
 oxyCODONE-acetaminophen 5-325 mg per tablet Commonly known as:  PERCOCET Dose:  1 Tab Take 1 Tab by mouth every four (4) hours as needed. Max Daily Amount: 6 Tabs. Quantity:  12 Tab Refills:  0 ASK your doctor about these medications Dose & Instructions Dispensing Information Comments HYDROcodone-acetaminophen 5-325 mg per tablet Commonly known as:  Rozann Brittany Dose:  1 Tab Take 1 Tab by mouth every four (4) hours as needed for Pain. Max Daily Amount: 6 Tabs. Quantity:  10 Tab Refills:  0 Current Immunizations Name Date DTAP Vaccine 8/28/2001, 8/19/1998, 1997, 1997, 1997 HIB Vaccine 8/19/1998, 1997, 1997, 1997 Hepatitis B Vaccine 1997, 1997, 1997 IPV 8/28/2001, 1997, 1997, 1997 Influenza Vaccine 10/31/2014 Influenza Vaccine (Quad) PF  Deferred () Influenza Vaccine Split 10/20/2012 MMR Vaccine 8/28/2001, 8/18/1998 TDAP Vaccine 8/6/2008 Surgery Information ID Date/Time Status Primary Surgeon All Procedures Location [id number] 4/4/2017 [address] [place] [address] MD EXPLORATORY laparotomy/ Left Salping-oophorectomy for Left Ovarian Cyst and Torsion [facility] MAIN OR Follow-up Information Follow up With Details Comments Contact Info Dr Demario Gonzalez on 4/12/2017 You have a new patient follow up appt. at 10:30am.  Please bring ID and a list of current medications. [address] [facility] [address] [place], [address] [facility] 
[phone] None   None (395) Patient stated that they have no PCP Discharge Instructions MyChart Activation Thank you for requesting access to [employer]. Please follow the instructions below to securely access and download your online medical record. [employer] allows you to send messages to your doctor, view your test results, renew your prescriptions, schedule appointments, and more. How Do I Sign Up? 1. In your internet browser, go to [email or web address]. 2. Click on the First Time User? Click Here link in the Sign In box. You will see the New Member Sign Up page. 3. Enter your [employer] Access Code exactly as it appears below. You will not need to use this code after youve completed the sign-up process. If you do not sign up before the expiration date, you must request a new code. [employer] Access Code: [email or web address] Expires: 2017  3:36 PM (This is the date your [employer] access code will ) 4. Enter the last four digits of your Social Security Number (xxxx) and Date of Birth (mm/dd/yyyy) as indicated and click Submit. You will be taken to the next sign-up page. 5. Create a [employer] ID. This will be your [employer] login ID and cannot be changed, so think of one that is secure and easy to remember. 6. Create a [employer] password. You can change your password at any time. 7. Enter your Password Reset Question and Answer. This can be used at a later time if you forget your password. 8. Enter your e-mail address. You will receive e-mail notification when new information is available in [address] 9. Click Sign Up. You can now view and download portions of your medical record. 10. Click the Download Summary menu link to download a portable copy of your medical information. Additional Information If you have questions, please visit the Frequently Asked Questions section of the [employer] website at [email or web address] Remember, [employer] is NOT to be used for urgent needs. For medical emergencies, dial 911. Chart Review Routing History No Routing History on File

## 2017-04-03 NOTE — IP AVS SNAPSHOT
[address] [facility] 
[phone] Patient: Esther Kim MRN: [id number] JFN:[birth year] You are allergic to the following No active allergies Immunizations Administered for This Admission Name Date Influenza Vaccine (Quad) PF  Deferred () Recent Documentation Height Weight Breastfeeding? BMI OB Status Smoking Status 1.6 m (30 %, Z= -0.51)* 102.5 kg (99 %, Z= 2.28)* No 40.03 kg/m2 (98 %, Z= 2.12)* Having regular periods Never Smoker *Growth percentiles are based on CDC 2-20 Years data. Unresulted Labs Order Current Status SAMPLE TO BLOOD BANK In process CULTURE, URINE Preliminary result Emergency Contacts Name Discharge Info Relation Home Work Mobile [address] CAREGIVER [3] Father [15] [phone] Richelle Badillo  Mother [14] [phone] About your hospitalization You were admitted on:  April 3, 2017 You last received care in the:  [facility] 3 ORTHOPEDICS You were discharged on:  April 6, 2017 Unit phone number:  [phone] Why you were hospitalized Your primary diagnosis was: Torsion Of Ovary, Ovarian Pedicle And Fallopian Tube Your diagnoses also included:  Ovarian Cyst, Left Providers Seen During Your Hospitalizations Provider Role Specialty Primary office phone Missy Smiley MD Attending Provider Emergency Medicine [phone] Raina Grubbs MD Attending Provider Obstetrics & Gynecology [phone] Your Primary Care Physician (PCP) Primary Care Physician Office Phone Office Fax NONE ** None ** ** None ** Follow-up Information Follow up With Details Comments Contact Info Dr Piotr Jackson on 4/12/2017 You have a new patient follow up appt. at 10:30am.  Please bring ID and a list of current medications. [address] [place], [address] [facility] [phone] None   None (395) Patient stated that they have no PCP Your Appointments Wednesday April 12, 2017 10:30 AM EDT New Patient with Ruperto Escobar MD  
[facility] [address] [place]  
[phone] Current Discharge Medication List  
  
START taking these medications Dose & Instructions Dispensing Information Comments Morning Noon Evening Bedtime  
 oxyCODONE-acetaminophen 5-325 mg per tablet Commonly known as:  PERCOCET Your last dose was: Your next dose is:    
   
   
 Dose:  1 Tab Take 1 Tab by mouth every four (4) hours as needed. Max Daily Amount: 6 Tabs. Quantity:  12 Tab Refills:  0 ASK your doctor about these medications Dose & Instructions Dispensing Information Comments Morning Noon Evening Bedtime HYDROcodone-acetaminophen 5-325 mg per tablet Commonly known as:  [place] Your last dose was: Your next dose is:    
   
   
 Dose:  1 Tab Take 1 Tab by mouth every four (4) hours as needed for Pain. Max Daily Amount: 6 Tabs. Quantity:  10 Tab Refills:  0 Where to Get Your Medications Information on where to get these meds will be given to you by the nurse or doctor. ! Ask your nurse or doctor about these medications  
  oxyCODONE-acetaminophen 5-325 mg per tablet Discharge Instructions [employer] Activation Thank you for requesting access to [employer]. Please follow the instructions below to securely access and download your online medical record. [employer] allows you to send messages to your doctor, view your test results, renew your prescriptions, schedule appointments, and more. How Do I Sign Up? 1. In your internet browser, go to [email or web address]. 2. Click on the First Time User? Click Here link in the Sign In box. You will see the New Member Sign Up page. 3. Enter your [employer] Access Code exactly as it appears below. You will not need to use this code after youve completed the sign-up process. If you do not sign up before the expiration date, you must request a new code. [employer] Access Code: [email or web address] Expires: 2017  3:36 PM (This is the date your [employer] access code will ) 4. Enter the last four digits of your Social Security Number (xxxx) and Date of Birth (mm/dd/yyyy) as indicated and click Submit. You will be taken to the next sign-up page. 5. Create a [employer] ID. This will be your [employer] login ID and cannot be changed, so think of one that is secure and easy to remember. 6. Create a [employer] password. You can change your password at any time. 7. Enter your Password Reset Question and Answer. This can be used at a later time if you forget your password. 8. Enter your e-mail address. You will receive e-mail notification when new information is available in [address] 9. Click Sign Up. You can now view and download portions of your medical record. 10. Click the Download Summary menu link to download a portable copy of your medical information. Additional Information If you have questions, please visit the Frequently Asked Questions section of the [employer] website at [email or web address] Remember, [employer] is NOT to be used for urgent needs. For medical emergencies, dial 911. Discharge Orders None Introducing [facility] SERVICES! [facility] introduces [employer] patient portal. Now you can access parts of your medical record, email your doctor's office, and request medication refills online. 1. In your internet browser, go to [email or web address] 2. Click on the First Time User? Click Here link in the Sign In box. You will see the New Member Sign Up page. 3. Enter your [email or web address] Access Code exactly as it appears below. You will not need to use this code after youve completed the sign-up process. If you do not sign up before the expiration date, you must request a new code. · [email or web address] Access Code: [email or web address] Expires: 5/27/2017  3:36 PM 
 
4. Enter the last four digits of your Social Security Number (xxxx) and Date of Birth (mm/dd/yyyy) as indicated and click Submit. You will be taken to the next sign-up page. 5. Create a [email or web address] ID. This will be your [email or web address] login ID and cannot be changed, so think of one that is secure and easy to remember. 6. Create a [email or web address] password. You can change your password at any time. 7. Enter your Password Reset Question and Answer. This can be used at a later time if you forget your password. 8. Enter your e-mail address. You will receive e-mail notification when new information is available in [address] 9. Click Sign Up. You can now view and download portions of your medical record. 10. Click the Download Summary menu link to download a portable copy of your medical information. If you have questions, please visit the Frequently Asked Questions section of the [email or web address] website. Remember, [email or web address] is NOT to be used for urgent needs. For medical emergencies, dial 911. Now available from your iPhone and Android! General Information Please provide this summary of care documentation to your next provider. Patient Signature:  ____________________________________________________________ Date:  ____________________________________________________________  
  
Joe Bautista Provider Signature:  ____________________________________________________________ Date:  ____________________________________________________________

## 2017-04-04 ENCOUNTER — ANESTHESIA (OUTPATIENT)
Dept: SURGERY | Age: 22
End: 2017-04-04
Payer: SELF-PAY

## 2017-04-04 ENCOUNTER — ANESTHESIA EVENT (OUTPATIENT)
Dept: SURGERY | Age: 22
End: 2017-04-04
Payer: SELF-PAY

## 2017-04-04 ENCOUNTER — SURGERY (OUTPATIENT)
Age: 22
End: 2017-04-04

## 2017-04-04 PROBLEM — N83.53 TORSION OF OVARY, OVARIAN PEDICLE AND FALLOPIAN TUBE: Status: ACTIVE | Noted: 2017-04-04

## 2017-04-04 LAB
C TRACH DNA SPEC QL NAA+PROBE: NEGATIVE
N GONORRHOEA DNA SPEC QL NAA+PROBE: NEGATIVE
SAMPLE TYPE: NORMAL
SERVICE CMNT-IMP: NORMAL
SPECIMEN SOURCE: NORMAL

## 2017-04-04 PROCEDURE — 74011250636 HC RX REV CODE- 250/636

## 2017-04-04 PROCEDURE — 76010000133 HC OR TIME 3 TO 3.5 HR: Performed by: OBSTETRICS & GYNECOLOGY

## 2017-04-04 PROCEDURE — 77030011640 HC PAD GRND REM COVD -A: Performed by: OBSTETRICS & GYNECOLOGY

## 2017-04-04 PROCEDURE — 88307 TISSUE EXAM BY PATHOLOGIST: CPT | Performed by: OBSTETRICS & GYNECOLOGY

## 2017-04-04 PROCEDURE — 77030019908 HC STETH ESOPH SIMS -A: Performed by: ANESTHESIOLOGY

## 2017-04-04 PROCEDURE — 77030013079 HC BLNKT BAIR HGGR 3M -A: Performed by: ANESTHESIOLOGY

## 2017-04-04 PROCEDURE — 77030026438 HC STYL ET INTUB CARD -A: Performed by: ANESTHESIOLOGY

## 2017-04-04 PROCEDURE — 88112 CYTOPATH CELL ENHANCE TECH: CPT | Performed by: EMERGENCY MEDICINE

## 2017-04-04 PROCEDURE — 96376 TX/PRO/DX INJ SAME DRUG ADON: CPT

## 2017-04-04 PROCEDURE — 77010033678 HC OXYGEN DAILY

## 2017-04-04 PROCEDURE — 77030018836 HC SOL IRR NACL ICUM -A: Performed by: OBSTETRICS & GYNECOLOGY

## 2017-04-04 PROCEDURE — 99218 HC RM OBSERVATION: CPT

## 2017-04-04 PROCEDURE — 77030034850: Performed by: OBSTETRICS & GYNECOLOGY

## 2017-04-04 PROCEDURE — 77030021678 HC GLIDESCP STAT DISP VERT -B: Performed by: ANESTHESIOLOGY

## 2017-04-04 PROCEDURE — 77030008684 HC TU ET CUF COVD -B: Performed by: ANESTHESIOLOGY

## 2017-04-04 PROCEDURE — 74011250636 HC RX REV CODE- 250/636: Performed by: OBSTETRICS & GYNECOLOGY

## 2017-04-04 PROCEDURE — 76060000037 HC ANESTHESIA 3 TO 3.5 HR: Performed by: OBSTETRICS & GYNECOLOGY

## 2017-04-04 PROCEDURE — 77030016154: Performed by: OBSTETRICS & GYNECOLOGY

## 2017-04-04 PROCEDURE — 76210000016 HC OR PH I REC 1 TO 1.5 HR: Performed by: OBSTETRICS & GYNECOLOGY

## 2017-04-04 PROCEDURE — 74011250637 HC RX REV CODE- 250/637: Performed by: OBSTETRICS & GYNECOLOGY

## 2017-04-04 PROCEDURE — 77030031139 HC SUT VCRL2 J&J -A: Performed by: OBSTETRICS & GYNECOLOGY

## 2017-04-04 PROCEDURE — 77030008771 HC TU NG SALEM SUMP -A: Performed by: ANESTHESIOLOGY

## 2017-04-04 PROCEDURE — 77030002996 HC SUT SLK J&J -A: Performed by: OBSTETRICS & GYNECOLOGY

## 2017-04-04 PROCEDURE — 77030008459 HC STPLR SKN COOP -B: Performed by: OBSTETRICS & GYNECOLOGY

## 2017-04-04 PROCEDURE — 74011000250 HC RX REV CODE- 250

## 2017-04-04 RX ORDER — SODIUM CHLORIDE 0.9 % (FLUSH) 0.9 %
5-10 SYRINGE (ML) INJECTION AS NEEDED
Status: DISCONTINUED | OUTPATIENT
Start: 2017-04-04 | End: 2017-04-06 | Stop reason: HOSPADM

## 2017-04-04 RX ORDER — SUCCINYLCHOLINE CHLORIDE 20 MG/ML
INJECTION INTRAMUSCULAR; INTRAVENOUS AS NEEDED
Status: DISCONTINUED | OUTPATIENT
Start: 2017-04-04 | End: 2017-04-04 | Stop reason: HOSPADM

## 2017-04-04 RX ORDER — FENTANYL CITRATE 50 UG/ML
25 INJECTION, SOLUTION INTRAMUSCULAR; INTRAVENOUS
Status: DISCONTINUED | OUTPATIENT
Start: 2017-04-04 | End: 2017-04-04 | Stop reason: HOSPADM

## 2017-04-04 RX ORDER — LIDOCAINE HYDROCHLORIDE 20 MG/ML
INJECTION, SOLUTION EPIDURAL; INFILTRATION; INTRACAUDAL; PERINEURAL AS NEEDED
Status: DISCONTINUED | OUTPATIENT
Start: 2017-04-04 | End: 2017-04-04 | Stop reason: HOSPADM

## 2017-04-04 RX ORDER — DOCUSATE SODIUM 100 MG/1
100 CAPSULE, LIQUID FILLED ORAL 2 TIMES DAILY
Status: DISCONTINUED | OUTPATIENT
Start: 2017-04-04 | End: 2017-04-06 | Stop reason: HOSPADM

## 2017-04-04 RX ORDER — SODIUM CHLORIDE, SODIUM LACTATE, POTASSIUM CHLORIDE, CALCIUM CHLORIDE 600; 310; 30; 20 MG/100ML; MG/100ML; MG/100ML; MG/100ML
25 INJECTION, SOLUTION INTRAVENOUS CONTINUOUS
Status: DISCONTINUED | OUTPATIENT
Start: 2017-04-04 | End: 2017-04-04 | Stop reason: HOSPADM

## 2017-04-04 RX ORDER — DIPHENHYDRAMINE HYDROCHLORIDE 50 MG/ML
12.5 INJECTION, SOLUTION INTRAMUSCULAR; INTRAVENOUS
Status: DISCONTINUED | OUTPATIENT
Start: 2017-04-04 | End: 2017-04-04

## 2017-04-04 RX ORDER — MIDAZOLAM HYDROCHLORIDE 1 MG/ML
INJECTION, SOLUTION INTRAMUSCULAR; INTRAVENOUS AS NEEDED
Status: DISCONTINUED | OUTPATIENT
Start: 2017-04-04 | End: 2017-04-04 | Stop reason: HOSPADM

## 2017-04-04 RX ORDER — GLYCOPYRROLATE 0.2 MG/ML
INJECTION INTRAMUSCULAR; INTRAVENOUS AS NEEDED
Status: DISCONTINUED | OUTPATIENT
Start: 2017-04-04 | End: 2017-04-04 | Stop reason: HOSPADM

## 2017-04-04 RX ORDER — HYDROMORPHONE HYDROCHLORIDE 2 MG/ML
2 INJECTION, SOLUTION INTRAMUSCULAR; INTRAVENOUS; SUBCUTANEOUS
Status: DISCONTINUED | OUTPATIENT
Start: 2017-04-04 | End: 2017-04-05

## 2017-04-04 RX ORDER — SODIUM CHLORIDE 0.9 % (FLUSH) 0.9 %
5-10 SYRINGE (ML) INJECTION AS NEEDED
Status: CANCELLED | OUTPATIENT
Start: 2017-04-04

## 2017-04-04 RX ORDER — SODIUM CHLORIDE 0.9 % (FLUSH) 0.9 %
5-10 SYRINGE (ML) INJECTION EVERY 8 HOURS
Status: DISCONTINUED | OUTPATIENT
Start: 2017-04-04 | End: 2017-04-06 | Stop reason: HOSPADM

## 2017-04-04 RX ORDER — DIPHENHYDRAMINE HYDROCHLORIDE 50 MG/ML
12.5 INJECTION, SOLUTION INTRAMUSCULAR; INTRAVENOUS
Status: DISCONTINUED | OUTPATIENT
Start: 2017-04-04 | End: 2017-04-06 | Stop reason: HOSPADM

## 2017-04-04 RX ORDER — MORPHINE SULFATE 10 MG/ML
2 INJECTION, SOLUTION INTRAMUSCULAR; INTRAVENOUS
Status: DISCONTINUED | OUTPATIENT
Start: 2017-04-04 | End: 2017-04-04 | Stop reason: HOSPADM

## 2017-04-04 RX ORDER — LIDOCAINE HYDROCHLORIDE 10 MG/ML
0.1 INJECTION, SOLUTION EPIDURAL; INFILTRATION; INTRACAUDAL; PERINEURAL AS NEEDED
Status: CANCELLED | OUTPATIENT
Start: 2017-04-04

## 2017-04-04 RX ORDER — NALOXONE HYDROCHLORIDE 0.4 MG/ML
0.4 INJECTION, SOLUTION INTRAMUSCULAR; INTRAVENOUS; SUBCUTANEOUS AS NEEDED
Status: DISCONTINUED | OUTPATIENT
Start: 2017-04-04 | End: 2017-04-06 | Stop reason: HOSPADM

## 2017-04-04 RX ORDER — ROCURONIUM BROMIDE 10 MG/ML
INJECTION, SOLUTION INTRAVENOUS AS NEEDED
Status: DISCONTINUED | OUTPATIENT
Start: 2017-04-04 | End: 2017-04-04 | Stop reason: HOSPADM

## 2017-04-04 RX ORDER — HYDROMORPHONE HYDROCHLORIDE 2 MG/ML
INJECTION, SOLUTION INTRAMUSCULAR; INTRAVENOUS; SUBCUTANEOUS
Status: DISCONTINUED
Start: 2017-04-04 | End: 2017-04-06 | Stop reason: HOSPADM

## 2017-04-04 RX ORDER — SODIUM CHLORIDE 0.9 % (FLUSH) 0.9 %
5-10 SYRINGE (ML) INJECTION EVERY 8 HOURS
Status: CANCELLED | OUTPATIENT
Start: 2017-04-04

## 2017-04-04 RX ORDER — NEOSTIGMINE METHYLSULFATE 1 MG/ML
INJECTION INTRAVENOUS AS NEEDED
Status: DISCONTINUED | OUTPATIENT
Start: 2017-04-04 | End: 2017-04-04 | Stop reason: HOSPADM

## 2017-04-04 RX ORDER — GLYCOPYRROLATE 0.2 MG/ML
INJECTION INTRAMUSCULAR; INTRAVENOUS AS NEEDED
Status: DISCONTINUED | OUTPATIENT
Start: 2017-04-04 | End: 2017-04-04

## 2017-04-04 RX ORDER — DEXAMETHASONE SODIUM PHOSPHATE 4 MG/ML
INJECTION, SOLUTION INTRA-ARTICULAR; INTRALESIONAL; INTRAMUSCULAR; INTRAVENOUS; SOFT TISSUE AS NEEDED
Status: DISCONTINUED | OUTPATIENT
Start: 2017-04-04 | End: 2017-04-04 | Stop reason: HOSPADM

## 2017-04-04 RX ORDER — KETOROLAC TROMETHAMINE 30 MG/ML
INJECTION, SOLUTION INTRAMUSCULAR; INTRAVENOUS AS NEEDED
Status: DISCONTINUED | OUTPATIENT
Start: 2017-04-04 | End: 2017-04-04 | Stop reason: HOSPADM

## 2017-04-04 RX ORDER — SODIUM CHLORIDE, SODIUM LACTATE, POTASSIUM CHLORIDE, CALCIUM CHLORIDE 600; 310; 30; 20 MG/100ML; MG/100ML; MG/100ML; MG/100ML
25 INJECTION, SOLUTION INTRAVENOUS CONTINUOUS
Status: CANCELLED | OUTPATIENT
Start: 2017-04-04 | End: 2017-04-05

## 2017-04-04 RX ORDER — ZOLPIDEM TARTRATE 5 MG/1
5 TABLET ORAL
Status: DISCONTINUED | OUTPATIENT
Start: 2017-04-04 | End: 2017-04-06 | Stop reason: HOSPADM

## 2017-04-04 RX ORDER — HYDROMORPHONE HYDROCHLORIDE 1 MG/ML
.2-.5 INJECTION, SOLUTION INTRAMUSCULAR; INTRAVENOUS; SUBCUTANEOUS
Status: DISCONTINUED | OUTPATIENT
Start: 2017-04-04 | End: 2017-04-04 | Stop reason: HOSPADM

## 2017-04-04 RX ORDER — ONDANSETRON 2 MG/ML
INJECTION INTRAMUSCULAR; INTRAVENOUS AS NEEDED
Status: DISCONTINUED | OUTPATIENT
Start: 2017-04-04 | End: 2017-04-04 | Stop reason: HOSPADM

## 2017-04-04 RX ORDER — PROPOFOL 10 MG/ML
INJECTION, EMULSION INTRAVENOUS AS NEEDED
Status: DISCONTINUED | OUTPATIENT
Start: 2017-04-04 | End: 2017-04-04 | Stop reason: HOSPADM

## 2017-04-04 RX ORDER — HYDROMORPHONE HYDROCHLORIDE 2 MG/ML
INJECTION, SOLUTION INTRAMUSCULAR; INTRAVENOUS; SUBCUTANEOUS AS NEEDED
Status: DISCONTINUED | OUTPATIENT
Start: 2017-04-04 | End: 2017-04-04 | Stop reason: HOSPADM

## 2017-04-04 RX ORDER — SODIUM CHLORIDE 0.9 % (FLUSH) 0.9 %
5-10 SYRINGE (ML) INJECTION AS NEEDED
Status: DISCONTINUED | OUTPATIENT
Start: 2017-04-04 | End: 2017-04-04 | Stop reason: HOSPADM

## 2017-04-04 RX ORDER — SODIUM CHLORIDE, SODIUM LACTATE, POTASSIUM CHLORIDE, CALCIUM CHLORIDE 600; 310; 30; 20 MG/100ML; MG/100ML; MG/100ML; MG/100ML
125 INJECTION, SOLUTION INTRAVENOUS CONTINUOUS
Status: DISCONTINUED | OUTPATIENT
Start: 2017-04-04 | End: 2017-04-05

## 2017-04-04 RX ORDER — HYDROMORPHONE HYDROCHLORIDE 1 MG/ML
1 INJECTION, SOLUTION INTRAMUSCULAR; INTRAVENOUS; SUBCUTANEOUS
Status: DISCONTINUED | OUTPATIENT
Start: 2017-04-04 | End: 2017-04-05

## 2017-04-04 RX ORDER — FENTANYL CITRATE 50 UG/ML
INJECTION, SOLUTION INTRAMUSCULAR; INTRAVENOUS AS NEEDED
Status: DISCONTINUED | OUTPATIENT
Start: 2017-04-04 | End: 2017-04-04 | Stop reason: HOSPADM

## 2017-04-04 RX ADMIN — HYDROMORPHONE HYDROCHLORIDE 0.6 MG: 2 INJECTION, SOLUTION INTRAMUSCULAR; INTRAVENOUS; SUBCUTANEOUS at 07:30

## 2017-04-04 RX ADMIN — HYDROMORPHONE HYDROCHLORIDE 1 MG: 1 INJECTION, SOLUTION INTRAMUSCULAR; INTRAVENOUS; SUBCUTANEOUS at 21:07

## 2017-04-04 RX ADMIN — ROCURONIUM BROMIDE 30 MG: 10 INJECTION, SOLUTION INTRAVENOUS at 06:15

## 2017-04-04 RX ADMIN — DOCUSATE SODIUM 100 MG: 100 CAPSULE, LIQUID FILLED ORAL at 11:37

## 2017-04-04 RX ADMIN — SUCCINYLCHOLINE CHLORIDE 100 MG: 20 INJECTION INTRAMUSCULAR; INTRAVENOUS at 05:16

## 2017-04-04 RX ADMIN — HYDROMORPHONE HYDROCHLORIDE 0.2 MG: 2 INJECTION, SOLUTION INTRAMUSCULAR; INTRAVENOUS; SUBCUTANEOUS at 07:15

## 2017-04-04 RX ADMIN — PROPOFOL 150 MG: 10 INJECTION, EMULSION INTRAVENOUS at 05:17

## 2017-04-04 RX ADMIN — Medication 10 ML: at 15:13

## 2017-04-04 RX ADMIN — FENTANYL CITRATE 100 MCG: 50 INJECTION, SOLUTION INTRAMUSCULAR; INTRAVENOUS at 05:18

## 2017-04-04 RX ADMIN — ONDANSETRON 4 MG: 2 INJECTION INTRAMUSCULAR; INTRAVENOUS at 06:45

## 2017-04-04 RX ADMIN — KETOROLAC TROMETHAMINE 30 MG: 30 INJECTION, SOLUTION INTRAMUSCULAR; INTRAVENOUS at 08:01

## 2017-04-04 RX ADMIN — ROCURONIUM BROMIDE 10 MG: 10 INJECTION, SOLUTION INTRAVENOUS at 07:38

## 2017-04-04 RX ADMIN — MIDAZOLAM HYDROCHLORIDE 2 MG: 1 INJECTION, SOLUTION INTRAMUSCULAR; INTRAVENOUS at 05:07

## 2017-04-04 RX ADMIN — ROCURONIUM BROMIDE 10 MG: 10 INJECTION, SOLUTION INTRAVENOUS at 05:13

## 2017-04-04 RX ADMIN — NEOSTIGMINE METHYLSULFATE 3.5 MG: 1 INJECTION INTRAVENOUS at 07:45

## 2017-04-04 RX ADMIN — FENTANYL CITRATE 100 MCG: 50 INJECTION, SOLUTION INTRAMUSCULAR; INTRAVENOUS at 06:23

## 2017-04-04 RX ADMIN — ROCURONIUM BROMIDE 10 MG: 10 INJECTION, SOLUTION INTRAVENOUS at 07:00

## 2017-04-04 RX ADMIN — PROPOFOL 200 MG: 10 INJECTION, EMULSION INTRAVENOUS at 05:13

## 2017-04-04 RX ADMIN — DEXAMETHASONE SODIUM PHOSPHATE 8 MG: 4 INJECTION, SOLUTION INTRA-ARTICULAR; INTRALESIONAL; INTRAMUSCULAR; INTRAVENOUS; SOFT TISSUE at 05:33

## 2017-04-04 RX ADMIN — SODIUM CHLORIDE, SODIUM LACTATE, POTASSIUM CHLORIDE, AND CALCIUM CHLORIDE 125 ML/HR: 600; 310; 30; 20 INJECTION, SOLUTION INTRAVENOUS at 08:45

## 2017-04-04 RX ADMIN — LIDOCAINE HYDROCHLORIDE 100 MG: 20 INJECTION, SOLUTION EPIDURAL; INFILTRATION; INTRACAUDAL; PERINEURAL at 05:13

## 2017-04-04 RX ADMIN — Medication 10 ML: at 15:12

## 2017-04-04 RX ADMIN — SODIUM CHLORIDE, SODIUM LACTATE, POTASSIUM CHLORIDE, AND CALCIUM CHLORIDE 125 ML/HR: 600; 310; 30; 20 INJECTION, SOLUTION INTRAVENOUS at 16:49

## 2017-04-04 RX ADMIN — GLYCOPYRROLATE 0.5 MG: 0.2 INJECTION INTRAMUSCULAR; INTRAVENOUS at 07:56

## 2017-04-04 RX ADMIN — HYDROMORPHONE HYDROCHLORIDE 1 MG: 1 INJECTION, SOLUTION INTRAMUSCULAR; INTRAVENOUS; SUBCUTANEOUS at 01:46

## 2017-04-04 RX ADMIN — SODIUM CHLORIDE, SODIUM LACTATE, POTASSIUM CHLORIDE, AND CALCIUM CHLORIDE: 600; 310; 30; 20 INJECTION, SOLUTION INTRAVENOUS at 06:40

## 2017-04-04 RX ADMIN — HYDROMORPHONE HYDROCHLORIDE 0.2 MG: 2 INJECTION, SOLUTION INTRAMUSCULAR; INTRAVENOUS; SUBCUTANEOUS at 06:54

## 2017-04-04 RX ADMIN — HYDROMORPHONE HYDROCHLORIDE 2 MG: 2 INJECTION, SOLUTION INTRAMUSCULAR; INTRAVENOUS; SUBCUTANEOUS at 04:03

## 2017-04-04 RX ADMIN — SUCCINYLCHOLINE CHLORIDE 150 MG: 20 INJECTION INTRAMUSCULAR; INTRAVENOUS at 05:13

## 2017-04-04 RX ADMIN — FENTANYL CITRATE 150 MCG: 50 INJECTION, SOLUTION INTRAMUSCULAR; INTRAVENOUS at 05:13

## 2017-04-04 NOTE — PROGRESS NOTES
Bedside shift change report given to Kimberly (oncoming nurse) by Clary Clarke (offgoing nurse). Report included the following information SBAR, ED Summary, Procedure Summary, Intake/Output, MAR and Accordion.

## 2017-04-04 NOTE — PROGRESS NOTES
Dr. Geovanna Cummins at bedside to evaluate patient due to increased pain with minimal relief from pain medication. Dr. Geovanna Cummins explained to patient that she would need to have surgery within the next hour to remove the cyst. He explained the risks involved and the surgery in detail. Patient was given the chance to ask any questions or concerns she may have. Patient stated that she understood and agreed to the surgery. Consent was then obtained     At 0410- patient was wiped down with CHG wipes and linen and gown changed in preparation for Surgery. Attempted to call patients mother 5 times but was unsuccessful in reaching her. At 9013- Anesthesiology was at bedside to evaluate patient and to discuss the plans and risks for surgery, Patient was given the chance to ask questions. At 0500- OR nurse was at bedside to meet patient and take her down for surgery.      Patients jewelry- cell phone- street clothes were left in the patients room

## 2017-04-04 NOTE — ANESTHESIA PREPROCEDURE EVALUATION
Anesthetic History   No history of anesthetic complications            Review of Systems / Medical History  Patient summary reviewed, nursing notes reviewed and pertinent labs reviewed    Pulmonary  Within defined limits                 Neuro/Psych   Within defined limits           Cardiovascular  Within defined limits                Exercise tolerance: >4 METS     GI/Hepatic/Renal  Within defined limits              Endo/Other        Morbid obesity and anemia     Other Findings   Comments: Ovarian torsion           Physical Exam    Airway  Mallampati: III  TM Distance: 4 - 6 cm  Neck ROM: normal range of motion   Mouth opening: Diminished (comment)     Cardiovascular    Rhythm: regular  Rate: normal         Dental  No notable dental hx       Pulmonary  Breath sounds clear to auscultation               Abdominal  GI exam deferred       Other Findings            Anesthetic Plan    ASA: 2, emergent  Anesthesia type: general    Monitoring Plan: BIS      Induction: Intravenous  Anesthetic plan and risks discussed with: Patient

## 2017-04-04 NOTE — PERIOP NOTES
TRANSFER - OUT REPORT:    Verbal report given to MAYRA Harris on Jd Finnegan  being transferred to [facility], [address] for routine post - op       Report consisted of patients Situation, Background, Assessment and   Recommendations(SBAR). Information from the following report(s) SBAR, Kardex, OR Summary, Intake/Output, MAR and Cardiac Rhythm NSR was reviewed with the receiving nurse. Lines:   Peripheral IV 04/04/17 Left Hand (Active)   Site Assessment Dry; Intact 4/4/2017  9:30 AM   Phlebitis Assessment 0 4/4/2017  9:30 AM   Infiltration Assessment 0 4/4/2017  9:30 AM   Dressing Status Clean, dry, & intact 4/4/2017  9:30 AM   Dressing Type Tape;Transparent 4/4/2017  9:30 AM   Hub Color/Line Status Pink; Infusing 4/4/2017  9:30 AM        Opportunity for questions and clarification was provided.       Patient transported with:   O2 @ 2 liters  Registered Nurse  Tech     1680  Patient's mother provided update on status

## 2017-04-04 NOTE — PROGRESS NOTES
TRANSFER - IN REPORT:    Verbal report received from Delia(name) on Richelle Walker  being received from PACU(unit) for routine progression of care      Report consisted of patients Situation, Background, Assessment and   Recommendations(SBAR). Information from the following report(s) SBAR, OR Summary, Procedure Summary, Intake/Output and MAR was reviewed with the receiving nurse. Opportunity for questions and clarification was provided. Assessment completed upon patients arrival to unit and care assumed.

## 2017-04-04 NOTE — ED NOTES
TRANSFER - OUT REPORT:    Verbal report given to MAYRA Peoples(name) on Sheree Bishop  being transferred to [facility] [id number] [facility](unit) for routine progression of care       Report consisted of patients Situation, Background, Assessment and   Recommendations(SBAR). Information from the following report(s) SBAR, Kardex, ED Summary, [facility] and Recent Results was reviewed with the receiving nurse. Lines:   Peripheral IV 04/03/17 Right Antecubital (Active)   Site Assessment Clean, dry, & intact 4/3/2017  9:49 PM   Phlebitis Assessment 0 4/3/2017  9:49 PM   Infiltration Assessment 0 4/3/2017  9:49 PM   Dressing Status Clean, dry, & intact 4/3/2017  9:49 PM   Dressing Type Transparent 4/3/2017  9:49 PM   Hub Color/Line Status Flushed;Pink 4/3/2017  9:49 PM   Action Taken Blood drawn 4/3/2017  9:49 PM   Alcohol Cap Used Yes 4/3/2017  9:49 PM        Opportunity for questions and clarification was provided.       Patient transported with:   [employer]

## 2017-04-04 NOTE — OP NOTES
Operative Note    Name: Jd Finnegan   Medical Record Number: [id number]   YOB: 1995    Preoperative Diagnosis: left ovarian torsion    Postoperative Diagnosis: left ovarian cyst and torsion    Procedure: Procedure(s):  EXPLORATORY laparotomy/ Left Salping-oophorectomy for Left Ovarian Cyst and Torsion    Surgeon:  Benny Knihgt MD     Assistant:  MD Derian Merritt MD    Anesthesia: General endotracheal    Findings:  12 x 10 x 8 cm cyanotic smooth surfaced left ovarian cyst, cyanotic left fallopian tube. Estimated Blood Loss:  100 ml    Drains: Orantes    Pathology /Specimens:    ID Type Source Tests Collected by Time Destination   1 : Left Tube and Ovary with Cyst Preservative Ovary  Benny Knight MD 4/4/2017 6895 Pathology   1 : peritoneal fluid Fresh Peritoneal Fluid  Benny Knight MD 4/4/2017 0557 Cytology       DVT Prophylaxis: SCD Hose    DESCRIPTION OF PROCEDURE: The patient was placed on the operating room table in the supine position and placed under general endotracheal anesthesia. Time out was done to confirm the operating procedure, surgeon, patient and site. Once confirmed by the team, procedure was started. The patient was prepped and draped in the usual fashion for abdominal surgery. Pfannenstiel incision was made and was carried down sharply through the skin, subcutaneous tissues, and fascia. The fascia was then bluntly and sharply dissected free from the underlying rectus muscles. The peritoneum was sharply entered and extended vertically. The OFlorencio-OKebede retractor was placed in the abdomen. The bowel was packed out of the field with three lap squares. The left uterine cornu was grasped with Lynn clamps. The left round ligament and proximal left fallopian tube were grasped with Kocher clamps, cut with Lopez scissors, then secured with sutures of 0 Vicryl.       The cystic left ovary and the left fallopian tube were delivered, then the left utero-ovarean ligament was doubly clamped with Kocher clamps. This pedicle was cut with scissors and secured with a free tie of 0 Vicryl and a more distal transfixion suture of 0 Vicryl. Excellent hemostasis was achieved. All pedicles were inspected and found to be dry. The abdomen was closed with 2-0 vicryl on the rectus muscles and #0 Vicryl on the fascia. The subcutaneous tissue was approximated with 2-0 plan gut in a running fashion and the skin was closed with Insorb absorbable subcuticular staples. The patient tolerated the procedure well and went to the recovery room in satisfactory condition with all lap tape, sponge, needle, and instrument counts reported as correct.  ml.

## 2017-04-04 NOTE — ANESTHESIA POSTPROCEDURE EVALUATION
Post-Anesthesia Evaluation and Assessment    Patient: Riri Hennessy MRN: [id number]  SSN: [id number]    YOB: 1995  Age: 23 y.o. Sex: female       Cardiovascular Function/Vital Signs  Visit Vitals    /44 (BP 1 Location: Right arm, BP Patient Position: At rest)    Pulse 85    Temp 37.3 °C (99.2 °F)    Resp 20    Ht 5' 3\" (1.6 m)    Wt 102.5 kg (225 lb 15.5 oz)    SpO2 100%    Breastfeeding No    BMI 40.03 kg/m2       Patient is status post general anesthesia for Procedure(s):  EXPLORATORY laparotomy/ Left Salping-oophorectomy for Left Ovarian Cyst and Torsion. Nausea/Vomiting: None    Postoperative hydration reviewed and adequate. Pain:  Pain Scale 1: FLACC (04/04/17 0915)  Pain Intensity 1: 4 (04/04/17 0445)   Managed    Neurological Status:   Neuro (WDL): Exceptions to WDL (04/04/17 0825)  Neuro  Neurologic State: Drowsy; Eyes open to stimulus (04/04/17 0825)  Orientation Level: Unable to verbalize (04/04/17 0825)  Cognition: Follows commands (04/04/17 0825)  Speech: Mouths words (04/04/17 0825)  LUE Motor Response: Purposeful (04/04/17 0825)  LLE Motor Response: Purposeful (04/04/17 0825)  RUE Motor Response: Purposeful (04/04/17 0825)  RLE Motor Response: Purposeful (04/04/17 0825)   At baseline    Mental Status and Level of Consciousness: Arousable    Pulmonary Status:   O2 Device: Nasal cannula (04/04/17 0915)   Adequate oxygenation and airway patent    Complications related to anesthesia: None    Post-anesthesia assessment completed.  No concerns    Signed By: Annette Loyd MD     April 4, 2017

## 2017-04-04 NOTE — H&P
Gynecology History and Physical    Name: Hema Bella MRN: [id number] SSN: [id number]    YOB: 1995  Age: 25 y.o. Sex: female       Subjective:      Chief complaint:  Left lower abdominal pain, nausea. Zari Angelo is a 25 y.o.  female with a history of onset around 19 of sharp left pelvic pain. Seen in ED [facility] on 17 when [address] showed a 13 x 10 x 7 cm unilocular left ovarian cyst.  Was to follow up with her gynecologist at [address] but did not do so. Returns to ER today with worsening pain, nausea, and an episode of  Vomiting. Repeat US shows the left ovarian cyst is about the same size. No free fluid in pelvis. Pain not relieved by Toredol and Percocet. She is admitted for pain control and observation. The current method of family planning is condoms most of the time. Past Medical History:   Diagnosis Date    Abdominal pain, other specified site     Keratosis pilaris 10/1/2009    Otitis media      Past Surgical History:   Procedure Laterality Date    HX ADENOIDECTOMY      HX HEENT      tubal/tonsils and adnoids    HX TONSILLECTOMY      HX TYMPANOSTOMY       OB History      Para Term  AB TAB SAB Ectopic Multiple Living    1                 No Known Allergies  Prior to Admission medications    Medication Sig Start Date End Date Taking? Authorizing Provider   HYDROcodone-acetaminophen (NORCO) 5-325 mg per tablet Take 1 Tab by mouth every four (4) hours as needed for Pain. Max Daily Amount: 6 Tabs. 17   Jose Riddle MD      History reviewed. No pertinent family history. Social History     Social History    Marital status: SINGLE     Spouse name: N/A    Number of children: N/A    Years of education: N/A     Occupational History    Not on file.      Social History Main Topics    Smoking status: Never Smoker    Smokeless tobacco: Never Used    Alcohol use No    Drug use: No    Sexual activity: No     Other Topics Concern    Not on file Social History Narrative       Review of Systems   Constitutional: Negative. HENT: Negative. Eyes: Negative. Respiratory: Negative. Cardiovascular: Negative. Gastrointestinal: Positive for abdominal pain, nausea and vomiting. Endocrine: Negative. Genitourinary: Negative. Negative for vaginal bleeding, vaginal discharge and vaginal pain. Musculoskeletal: Negative. Skin: Negative. Allergic/Immunologic: Negative. Neurological: Negative. Hematological: Negative. Psychiatric/Behavioral: Negative. Objective:     Vitals:    04/03/17 1757   BP: 121/58   Pulse: 80   Resp: 20   Temp: 97.2 °F (36.2 °C)   SpO2: 100%   Weight: 102.5 kg (225 lb 15.5 oz)   Height: 5' 3\" (1.6 m)       Physical Exam   Nursing note and vitals reviewed. Constitutional: She is oriented to person, place, and time. Very uncomfortable, turning in bed. HENT:   Head: Normocephalic and atraumatic. Eyes: Conjunctivae are normal. Pupils are equal, round, and reactive to light. Neck: Normal range of motion. Cardiovascular: Normal rate and regular rhythm. Pulmonary/Chest: Effort normal.   Abdominal:   Obese, soft, normal BS. No masses or organomegaly. Mild LLQ direct tenderness, no rebound. Genitourinary:   Genitourinary Comments: External genitalia:  Normal.  Vagina:  No bleeding or discharge. Cervix:  Small, slightly tender. Fundus:  Anterior, normal size, mobile, slightly tender. Adnexa:  Left adnexal fullness, moderately tender. Rectovaginal:  Confirms. Musculoskeletal: Normal range of motion. Neurological: She is alert and oriented to person, place, and time. Skin: Skin is warm and dry. Psychiatric:   Agitated. Assessment:     Left ovarian cyst    Plan:     1. Observation status. 2. NPO  3. Hydromorphone IV  4. Zofran IV  5. Repeat CBC in AM  6. Patient counseled that she may require surgery.     Signed By:  Cindy Polanco MD     April 3, 2017

## 2017-04-04 NOTE — PROGRESS NOTES
Spoke with Dr. Josue Singletary about patients pain. Hydromorphone 1mg was given in the ED at 0146 with minimal pain relief; at 0330 patient was in thrashing from side to side in the fetal position crying due to the extreme pain rating 10/10 on the pain scale.  Dr. Josue Singletary order 2mg Hydromorphone IV Q3prn for severe pain- telephone order with read back

## 2017-04-04 NOTE — PROGRESS NOTES
I was called to the OR and assisted Dr Brigette Clemente with byers portions of the case.     See separately dictated OP note by Dr Brigette Clemente for details

## 2017-04-04 NOTE — PROGRESS NOTES
Bedside and Verbal shift change report given to Edilia Amaral RN  (oncoming nurse) by Haja Montenegro RN  (offgoing nurse). Report included the following information SBAR, Kardex, OR Summary, Intake/Output and MAR.

## 2017-04-04 NOTE — PROGRESS NOTES
Patient still in extreme pain, despite Dilaudid. Still very nauseated but has not vomited since arriving on floor. EXAM:  No change in abdominal findings. IMPRESSION:  Probable torsion of left ovarian cyst.    PLAN:  Situation discussed in detail with the patient. I have recommended laparotomy, probable removal of left ovary, possible removal of the left fallopian tube as well, and the patient consents to this surgery. We have discussed risks and possible complications of the procedure, including hemorrhage, possibly requiring blood transfusions, with the chance of transfusion reaction or transmission of infections such as hepatitis or HIV; infection; injury to bladder, bowel, or ureter; thrombotic events, such as thrombophlebitis or pulmonary embolism, and, very rarely, death. The partient states she understands this information and voices no additional questions or concerns.

## 2017-04-04 NOTE — PERIOP NOTES
Handoff Report from Operating Room to PACU    Report received from  Satya Lyon RN and MARGO Pritchard CRNA regarding Alicia Joseph. Surgeon(s):  Charol Pickerel, MD Clare Sicard, MD Sherley Pitter, MD Evie Sallies, MD  And Procedure(s) (LRB):  EXPLORATORY laparotomy/ Left Salping-oophorectomy for Left Ovarian Cyst and Torsion (N/A)  confirmed   with allergies and dressings discussed. Anesthesia type, drugs, patient history, complications, estimated blood loss, vital signs, intake and output, and last pain medication, lines, reversal medications and temperature were reviewed.

## 2017-04-04 NOTE — PROGRESS NOTES
Received report from Roann Sandifer in the ED. Patient arrived on the unit at 0215 in extreme pain.  At 0239 patient is resting (sleeping) comfortable at this moment

## 2017-04-05 LAB
ERYTHROCYTE [DISTWIDTH] IN BLOOD BY AUTOMATED COUNT: 16 % (ref 11.5–14.5)
ERYTHROCYTE [DISTWIDTH] IN BLOOD BY AUTOMATED COUNT: 16 % (ref 11.5–14.5)
HCT VFR BLD AUTO: 25.7 % (ref 35–47)
HCT VFR BLD AUTO: 27.9 % (ref 35–47)
HGB BLD-MCNC: 8 G/DL (ref 11.5–16)
HGB BLD-MCNC: 8.6 G/DL (ref 11.5–16)
MCH RBC QN AUTO: 23.7 PG (ref 26–34)
MCH RBC QN AUTO: 23.8 PG (ref 26–34)
MCHC RBC AUTO-ENTMCNC: 30.8 G/DL (ref 30–36.5)
MCHC RBC AUTO-ENTMCNC: 31.1 G/DL (ref 30–36.5)
MCV RBC AUTO: 76.5 FL (ref 80–99)
MCV RBC AUTO: 76.9 FL (ref 80–99)
PLATELET # BLD AUTO: 345 K/UL (ref 150–400)
PLATELET # BLD AUTO: 375 K/UL (ref 150–400)
RBC # BLD AUTO: 3.36 M/UL (ref 3.8–5.2)
RBC # BLD AUTO: 3.63 M/UL (ref 3.8–5.2)
WBC # BLD AUTO: 10.9 K/UL (ref 3.6–11)
WBC # BLD AUTO: 11.1 K/UL (ref 3.6–11)

## 2017-04-05 PROCEDURE — 99218 HC RM OBSERVATION: CPT

## 2017-04-05 PROCEDURE — 74011250636 HC RX REV CODE- 250/636: Performed by: OBSTETRICS & GYNECOLOGY

## 2017-04-05 PROCEDURE — 85027 COMPLETE CBC AUTOMATED: CPT | Performed by: OBSTETRICS & GYNECOLOGY

## 2017-04-05 PROCEDURE — 36415 COLL VENOUS BLD VENIPUNCTURE: CPT | Performed by: OBSTETRICS & GYNECOLOGY

## 2017-04-05 PROCEDURE — 74011250637 HC RX REV CODE- 250/637: Performed by: OBSTETRICS & GYNECOLOGY

## 2017-04-05 RX ORDER — IBUPROFEN 400 MG/1
400 TABLET ORAL
Status: DISCONTINUED | OUTPATIENT
Start: 2017-04-05 | End: 2017-04-06 | Stop reason: HOSPADM

## 2017-04-05 RX ORDER — OXYCODONE AND ACETAMINOPHEN 5; 325 MG/1; MG/1
2 TABLET ORAL
Status: DISCONTINUED | OUTPATIENT
Start: 2017-04-05 | End: 2017-04-06 | Stop reason: HOSPADM

## 2017-04-05 RX ADMIN — HYDROMORPHONE HYDROCHLORIDE 1 MG: 1 INJECTION, SOLUTION INTRAMUSCULAR; INTRAVENOUS; SUBCUTANEOUS at 01:44

## 2017-04-05 RX ADMIN — SODIUM CHLORIDE, SODIUM LACTATE, POTASSIUM CHLORIDE, AND CALCIUM CHLORIDE 125 ML/HR: 600; 310; 30; 20 INJECTION, SOLUTION INTRAVENOUS at 00:47

## 2017-04-05 RX ADMIN — DOCUSATE SODIUM 100 MG: 100 CAPSULE, LIQUID FILLED ORAL at 08:50

## 2017-04-05 RX ADMIN — HYDROMORPHONE HYDROCHLORIDE 1 MG: 1 INJECTION, SOLUTION INTRAMUSCULAR; INTRAVENOUS; SUBCUTANEOUS at 08:51

## 2017-04-05 RX ADMIN — Medication 10 ML: at 06:13

## 2017-04-05 RX ADMIN — Medication 10 ML: at 23:33

## 2017-04-05 RX ADMIN — DOCUSATE SODIUM 100 MG: 100 CAPSULE, LIQUID FILLED ORAL at 18:27

## 2017-04-05 RX ADMIN — OXYCODONE HYDROCHLORIDE AND ACETAMINOPHEN 2 TABLET: 5; 325 TABLET ORAL at 13:38

## 2017-04-05 RX ADMIN — Medication 10 ML: at 14:28

## 2017-04-05 NOTE — PROGRESS NOTES
Bedside and Verbal shift change report given to Daniel NUNEZ (oncoming nurse) by Jony Spencer (offgoing nurse). Report included the following information SBAR, Kardex, Procedure Summary, Intake/Output and Recent Results.

## 2017-04-05 NOTE — PROGRESS NOTES
Bedside and Verbal shift change report given to Claudia Pierre RN  (oncoming nurse) by Pito Ayala RN (offgoing nurse). Report included the following information SBAR, Kardex, OR Summary, Intake/Output and MAR   Ambulated pt in hallway, pt tolerated it well, ferrer catheter was dcd at 2100, has not voided at this time, no c/o discomfort.

## 2017-04-05 NOTE — PROGRESS NOTES
Gynecology Progress Note    Patient doing well post-op day 1 from Procedure(s):  EXPLORATORY laparotomy/ Left Salping-oophorectomy for Left Ovarian Cyst and Torsion without significant complaints. Pain controlled on current medication. Voiding without difficulty. Patient is passing flatus. Vitals:  Blood pressure 110/51, pulse 82, temperature 99 °F (37.2 °C), resp. rate 18, height 5' 3\" (1.6 m), weight 102.5 kg (225 lb 15.5 oz), last menstrual period 2017, SpO2 100 %, not currently breastfeeding. Temp (24hrs), Av.5 °F (36.9 °C), Min:97.9 °F (36.6 °C), Max:99 °F (37.2 °C)        Exam:  Patient without distress. Abdomen soft,  nontender. Incision dry and clean without erythema. Lower extremities are negative for swelling, cords, or tenderness. Lab/Data Review:  CBC:   Lab Results   Component Value Date/Time    WBC 11.1 (H) 2017 04:00 AM    HGB 8.0 (L) 2017 04:00 AM    HCT 25.7 (L) 2017 04:00 AM     2017 04:00 AM       Assessment and Plan:  Patient appears to be having uncomplicated post Procedure(s):  EXPLORATORY laparotomy/ Left Salping-oophorectomy for Left Ovarian Cyst and Torsion course. Continue routine post-op care. Anticipate discharge tomorrow.

## 2017-04-05 NOTE — PROGRESS NOTES
Spiritual Care Partner Volunteer visited patient in orthopedics on 4/5/2017. Documented by:  Visit by: Rev. Neal Schroeder.  Drake De La Cruz MA, [facility]    Lead  Profession Development & Advancement

## 2017-04-05 NOTE — PROGRESS NOTES
SW met with pt to provide Obs letter. Copy given to pt and copy placed on pt bedside chart. SW inquired if pt would like a PCP appt post discharge as chart noted pt did not have a PCP on file. Pt in agreement with scheduling a PCP appt. SW also inquired if pt would be interested in a care card wil as pt is self-pay. Pt stated interested. SW will have [facility] screen her or provide pt with a care care application. CM Specialist Fabio Mckeon, made pt a new PCP/follow up appt post discharge. Appt added to pt AVS.  SW will continue to follow and assist with additional discharge needs.     Rohini Bonds, MARCO A  Ext [phone]

## 2017-04-05 NOTE — ROUTINE PROCESS
Bedside and Verbal shift change report given to MAYRA Cash (oncoming nurse) by Laney Hernandez RN (offgoing nurse). Report included the following information SBAR, Kardex, Intake/Output, MAR, Recent Results and Med Rec Status.

## 2017-04-06 VITALS
HEIGHT: 63 IN | WEIGHT: 225.97 LBS | RESPIRATION RATE: 18 BRPM | DIASTOLIC BLOOD PRESSURE: 59 MMHG | SYSTOLIC BLOOD PRESSURE: 117 MMHG | TEMPERATURE: 98.5 F | BODY MASS INDEX: 40.04 KG/M2 | HEART RATE: 94 BPM | OXYGEN SATURATION: 100 %

## 2017-04-06 LAB
BACTERIA SPEC CULT: ABNORMAL
CC UR VC: ABNORMAL
SERVICE CMNT-IMP: ABNORMAL

## 2017-04-06 PROCEDURE — 99218 HC RM OBSERVATION: CPT

## 2017-04-06 PROCEDURE — 74011250637 HC RX REV CODE- 250/637: Performed by: OBSTETRICS & GYNECOLOGY

## 2017-04-06 RX ORDER — OXYCODONE AND ACETAMINOPHEN 5; 325 MG/1; MG/1
1 TABLET ORAL
Qty: 12 TAB | Refills: 0 | Status: SHIPPED | OUTPATIENT
Start: 2017-04-06 | End: 2017-04-12 | Stop reason: ALTCHOICE

## 2017-04-06 RX ADMIN — Medication 10 ML: at 08:14

## 2017-04-06 RX ADMIN — DOCUSATE SODIUM 100 MG: 100 CAPSULE, LIQUID FILLED ORAL at 08:15

## 2017-04-06 NOTE — PROGRESS NOTES
1045- I have reviewed discharge instructions with the patient. The patient verbalized understanding. No changes in VS or assessment upon discharge. Follow up numbers for [facility] and [facility] noted on discharge instructions    1110- Patient leaving via car with mother to private residence.  No changes in VS or assessment upon discharge

## 2017-04-06 NOTE — PROGRESS NOTES
Gynecology Progress Note    Patient doing well without significant complaints. Pain controlled on current medication. Voiding without difficulty. Patient is passing flatus, tolerating diet. Vitals:  Blood pressure 117/59, pulse 94, temperature 98.5 °F (36.9 °C), resp. rate 18, height 5' 3\" (1.6 m), weight 102.5 kg (225 lb 15.5 oz), last menstrual period 2017, SpO2 100 %, not currently breastfeeding. Temp (24hrs), Av.8 °F (37.1 °C), Min:98.4 °F (36.9 °C), Max:99.4 °F (37.4 °C)        Exam:  Patient without distress. Abdomen soft,  nontender. Incision clear               Lower extremities are negative for swelling, cords, or tenderness. Labs:   Recent Results (from the past 24 hour(s))   CBC W/O DIFF    Collection Time: 17  1:45 PM   Result Value Ref Range    WBC 10.9 3.6 - 11.0 K/uL    RBC 3.63 (L) 3.80 - 5.20 M/uL    HGB 8.6 (L) 11.5 - 16.0 g/dL    HCT 27.9 (L) 35.0 - 47.0 %    MCV 76.9 (L) 80.0 - 99.0 FL    MCH 23.7 (L) 26.0 - 34.0 PG    MCHC 30.8 30.0 - 36.5 g/dL    RDW 16.0 (H) 11.5 - 14.5 %    PLATELET 291 774 - 798 K/uL       Patient Active Problem List   Diagnosis Code    Abdominal pain, unspecified site R10.9    Keratosis Pilaris L85.8    Ovarian cyst, left N83.202    Torsion of ovary, ovarian pedicle and fallopian tube N83.53       Assessment and Plan: Doing well. Discharge home today. Precautions and activities discussed; questions answered. Recommend follow-up in 10-14 days. Rx: Percocet--discussed use, SEs in detail.      Joanna Lange MD

## 2017-04-06 NOTE — DISCHARGE INSTRUCTIONS
[facility] Activation    Thank you for requesting access to [facility]. Please follow the instructions below to securely access and download your online medical record. [facility] allows you to send messages to your doctor, view your test results, renew your prescriptions, schedule appointments, and more. How Do I Sign Up? 1. In your internet browser, go to [email or web address]. 2. Click on the First Time User? Click Here link in the Sign In box. You will see the New Member Sign Up page. 3. Enter your [facility] Access Code exactly as it appears below. You will not need to use this code after youve completed the sign-up process. If you do not sign up before the expiration date, you must request a new code. [facility] Access Code: [email or web address]  Expires: 2017  3:36 PM (This is the date your [facility] access code will )    4. Enter the last four digits of your Social Security Number (xxxx) and Date of Birth (mm/dd/yyyy) as indicated and click Submit. You will be taken to the next sign-up page. 5. Create a [facility] ID. This will be your [facility] login ID and cannot be changed, so think of one that is secure and easy to remember. 6. Create a [facility] password. You can change your password at any time. 7. Enter your Password Reset Question and Answer. This can be used at a later time if you forget your password. 8. Enter your e-mail address. You will receive e-mail notification when new information is available in [address]. 9. Click Sign Up. You can now view and download portions of your medical record. 10. Click the Download Summary menu link to download a portable copy of your medical information. Additional Information    If you have questions, please visit the Frequently Asked Questions section of the [facility] website at [email or web address] Remember, [facility] is NOT to be used for urgent needs. For medical emergencies, dial 911.

## 2017-04-07 NOTE — PROGRESS NOTES
Bedside shift change report given to [place] [address] (oncoming nurse) by Collin Haque RN (offgoing nurse). Report included the following information SBAR, Kardex, MAR, Recent Results and Med Rec Status.

## 2017-04-12 ENCOUNTER — OFFICE VISIT (OUTPATIENT)
Dept: FAMILY MEDICINE CLINIC | Age: 22
End: 2017-04-12

## 2017-04-12 VITALS
OXYGEN SATURATION: 98 % | HEART RATE: 105 BPM | TEMPERATURE: 98.2 F | DIASTOLIC BLOOD PRESSURE: 74 MMHG | WEIGHT: 222.2 LBS | HEIGHT: 63 IN | SYSTOLIC BLOOD PRESSURE: 127 MMHG | BODY MASS INDEX: 39.37 KG/M2 | RESPIRATION RATE: 16 BRPM

## 2017-04-12 DIAGNOSIS — Z13.220 SCREENING CHOLESTEROL LEVEL: ICD-10-CM

## 2017-04-12 DIAGNOSIS — Z13.1 SCREENING FOR DIABETES MELLITUS (DM): ICD-10-CM

## 2017-04-12 DIAGNOSIS — Z90.721 S/P UNILATERAL SALPINGO-OOPHORECTOMY: ICD-10-CM

## 2017-04-12 DIAGNOSIS — D50.9 MICROCYTIC NORMOCHROMIC ANEMIA: ICD-10-CM

## 2017-04-12 DIAGNOSIS — Z00.00 MEDICARE ANNUAL WELLNESS VISIT, INITIAL: Primary | ICD-10-CM

## 2017-04-12 NOTE — PATIENT INSTRUCTIONS
Iron-Rich Diet: Care Instructions  Your Care Instructions  Your body needs iron to make hemoglobin. Hemoglobin is a substance in red blood cells that carries oxygen from the lungs to cells all through your body. If you do not get enough iron, your body makes fewer and smaller red blood cells. As a result, your body's cells may not get enough oxygen. Adult men need 8 milligrams of iron a day; adult women need 18 milligrams of iron a day. After menopause, women need 8 milligrams of iron a day. A pregnant woman needs 27 milligrams of iron a day. Infants and young children have higher iron needs relative to their size than other age groups. People who have lost blood because of ulcers or heavy menstrual periods may become very low in iron and may develop anemia. Most people can get the iron their bodies need by eating enough of certain iron-rich foods. Your doctor may recommend that you take an iron supplement along with eating an iron-rich diet. Follow-up care is a key part of your treatment and safety. Be sure to make and go to all appointments, and call your doctor if you are having problems. Its also a good idea to know your test results and keep a list of the medicines you take. How can you care for yourself at home? · Make iron-rich foods a part of your daily diet. Iron-rich foods include:  ¨ All meats, such as chicken, beef, lamb, pork, fish, and shellfish. Liver is especially high in iron. ¨ Leafy green vegetables. ¨ Raisins, peas, beans, lentils, barley, and eggs. ¨ Iron-fortified breakfast cereals. · Eat foods with vitamin C along with iron-rich foods. Vitamin C helps you absorb more iron from food. Drink a glass of orange juice or another citrus juice with your food. · Eat meat and vegetables or grains together. The iron in meat helps your body absorb the iron in other foods. Where can you learn more? Go to http://sohail-geoffrey.info/.   Enter [id number] in the search box to learn more about \"Iron-Rich Diet: Care Instructions. \"  Current as of: July 26, 2016  Content Version: 11.2  © 8319-2842 [employer], [employer]. Care instructions adapted under license by [employer] (which disclaims liability or warranty for this information). If you have questions about a medical condition or this instruction, always ask your healthcare professional. [address] any warranty or liability for your use of this information.

## 2017-04-12 NOTE — PROGRESS NOTES
Chief Complaint   Patient presents with   [address]     has staples in stomach from surgery on 4/4/17 needs PCP to look at them     HPI:  Anthony Ellison is a 23 y.o.  female presents to establish care. Patient is s/p exploratory laparotomy salping-oophorectomy for Left Ovarian Cyst torsion on surgery on 4/4/17  Dissolvable staples were used. Patient is doing good, no fever/chills, denies pain at surgery site.      Review of Systems  Constitutional: negative for fevers/chills  Respiratory:  negative for cough, dyspnea,wheezing  CV:   negative for chest pain, palpitations, lower extremity edema  GI:   negative for nausea, vomiting, abdominal pain  Endo:               negative for polyuria,polydipsia,polyphagia,heat intolerance  Genitourinary: negative for frequency, dysuria  Neurological:  negative for headaches, dizziness    Past Medical History:   Diagnosis Date    Abdominal pain, other specified site     Keratosis pilaris 10/1/2009    Otitis media      Past Surgical History:   Procedure Laterality Date    HX ADENOIDECTOMY      HX GYN      HX HEENT      tubal/tonsils and adnoids    HX TONSILLECTOMY      HX TYMPANOSTOMY       Social History    Marital status: SINGLE     Spouse name: N/A    Number of children: N/A    Years of education: N/A     Social History Main Topics    Smoking status: Never Smoker    Smokeless tobacco: Never Used    Alcohol use No    Drug use: No    Sexual activity: No     Family History   Problem Relation Age of Onset    Asthma Mother     Stroke Father     No Known Problems Sister     Asthma Brother     No Known Problems Brother     No Known Problems Sister      No Known Allergies    Objective:  Visit Vitals    /74 (BP 1 Location: Right arm, BP Patient Position: Sitting)    Pulse (!) 105    Temp 98.2 °F (36.8 °C) (Oral)    Resp 16    Ht 5' 3\" (1.6 m)    Wt 222 lb 3.2 oz (100.8 kg)    LMP 03/07/2017    SpO2 98%    BMI 39.36 kg/m2     Physical Exam:   General appearance - alert, well appearing, in no distress  Mental status - alert, oriented to person, place, and time  EYE-PERRL, EOMI  Neck - supple, no significant adenopathy   Chest - clear to auscultation, no wheezes, rales or rhonchi  Heart - normal rate, regular rhythm, normal blood pressure  Abdomen - soft, nontender, well healed laparoscopic scar, no organomegaly  Ext-peripheral pulses normal, no pedal edema  Neuro -alert, oriented, normal speech, no focal findings    Assessment/Plan:    ICD-10-CM ICD-9-CM    1. Medicare annual wellness visit, initial Z00.00 V70.0 CBC W/O DIFF      METABOLIC PANEL, COMPREHENSIVE      UA/M W/RFLX CULTURE, ROUTINE   2. Microcytic normochromic anemia D50.9 280.9 CBC W/O DIFF      IRON PROFILE   3. S/P unilateral salpingo-oophorectomy Z90.721 V45.77 REFERRAL TO GYNECOLOGY   4. Screening for diabetes mellitus (DM) Z13.1 V77.1 HEMOGLOBIN A1C WITH EAG   5. Screening cholesterol level Z13.220 V77.91 LIPID PANEL     Patient Instructions        Iron-Rich Diet: Care Instructions  Your Care Instructions  Your body needs iron to make hemoglobin. Hemoglobin is a substance in red blood cells that carries oxygen from the lungs to cells all through your body. If you do not get enough iron, your body makes fewer and smaller red blood cells. As a result, your body's cells may not get enough oxygen. Adult men need 8 milligrams of iron a day; adult women need 18 milligrams of iron a day. After menopause, women need 8 milligrams of iron a day. A pregnant woman needs 27 milligrams of iron a day. Infants and young children have higher iron needs relative to their size than other age groups. People who have lost blood because of ulcers or heavy menstrual periods may become very low in iron and may develop anemia. Most people can get the iron their bodies need by eating enough of certain iron-rich foods.  Your doctor may recommend that you take an iron supplement along with eating an iron-rich diet. Follow-up care is a key part of your treatment and safety. Be sure to make and go to all appointments, and call your doctor if you are having problems. Its also a good idea to know your test results and keep a list of the medicines you take. How can you care for yourself at home? · Make iron-rich foods a part of your daily diet. Iron-rich foods include:  ¨ All meats, such as chicken, beef, lamb, pork, fish, and shellfish. Liver is especially high in iron. ¨ Leafy green vegetables. ¨ Raisins, peas, beans, lentils, barley, and eggs. ¨ Iron-fortified breakfast cereals. · Eat foods with vitamin C along with iron-rich foods. Vitamin C helps you absorb more iron from food. Drink a glass of orange juice or another citrus juice with your food. · Eat meat and vegetables or grains together. The iron in meat helps your body absorb the iron in other foods. Where can you learn more? Go to http://sohail-geoffrey.info/. Enter [id number] in the search box to learn more about \"Iron-Rich Diet: Care Instructions. \"  Current as of: July 26, 2016  Content Version: 11.2  © 0555-7661 [employer]. Care instructions adapted under license by [employer] (which disclaims liability or warranty for this information). If you have questions about a medical condition or this instruction, always ask your healthcare professional. [place] [address] any warranty or liability for your use of this information. Follow-up Disposition:  Return 2-3 weeks, for f/u results.

## 2017-04-12 NOTE — MR AVS SNAPSHOT
Visit Information Date & Time Provider Department Dept. Phone Encounter #  
 4/12/2017 10:30 AM Olivia Cordova MD [facility] at [address] [id number] Follow-up Instructions Return 2-3 weeks, for f/u results. Upcoming Health Maintenance Date Due Hepatitis A Peds Age 1-18 (1 of 2 - Standard Series) 4/4/1998 HPV AGE 9Y-26Y (1 of 3 - Female 3 Dose Series) 4/4/2008 INFLUENZA AGE 9 TO ADULT 8/1/2016 DTaP/Tdap/Td series (7 - Td) 8/6/2018 Allergies as of 4/12/2017  Review Complete On: 4/12/2017 By: Olivia Cordova MD  
 No Known Allergies Current Immunizations  Never Reviewed Name Date DTAP Vaccine 8/28/2001, 8/19/1998, 1997, 1997, 1997 HIB Vaccine 8/19/1998, 1997, 1997, 1997 Hepatitis B Vaccine 1997, 1997, 1997 IPV 8/28/2001, 1997, 1997, 1997 Influenza Vaccine 10/31/2014 Influenza Vaccine Split 10/20/2012 MMR Vaccine 8/28/2001, 8/18/1998 TDAP Vaccine 8/6/2008 Not reviewed this visit You Were Diagnosed With   
  
 Codes Comments Medicare annual wellness visit, initial    -  Primary ICD-10-CM: Z00.00 ICD-9-CM: V70.0 Microcytic normochromic anemia     ICD-10-CM: D50.9 ICD-9-CM: 280.9 S/P unilateral salpingo-oophorectomy     ICD-10-CM: Z50.192 ICD-9-CM: V45.77 Screening for diabetes mellitus (DM)     ICD-10-CM: Z13.1 ICD-9-CM: V77.1 Screening cholesterol level     ICD-10-CM: I26.110 ICD-9-CM: V77.91 Vitals BP Pulse Temp Resp Height(growth percentile) Weight(growth percentile) 127/74 (BP 1 Location: Right arm, BP Patient Position: Sitting) (!) 105 98.2 °F (36.8 °C) (Oral) 16 5' 3\" (1.6 m) 222 lb 3.2 oz (100.8 kg) LMP SpO2 BMI OB Status Smoking Status 03/07/2017 98% 39.36 kg/m2 Having regular periods Never Smoker BMI and BSA Data  Body Mass Index Body Surface Area  
 39.36 kg/m 2 2.12 m 2  
  
  
 Preferred Pharmacy Pharmacy Name Phone [id number] [place], [address]  Susana Fraction [phone] Your Updated Medication List  
  
Notice  As of 4/12/2017 11:39 AM  
 You have not been prescribed any medications. We Performed the Following CBC W/O DIFF [58854 CPT(R)] HEMOGLOBIN A1C WITH EAG [16202 CPT(R)] IRON PROFILE [id number] CPT(R)] LIPID PANEL [07669 CPT(R)] METABOLIC PANEL, COMPREHENSIVE [63440 CPT(R)] REFERRAL TO GYNECOLOGY [REF30 Custom] Comments: S/p left Salping-oophorectomy for Left Ovarian Cyst and Torsion UA/M W/RFLX CULTURE, ROUTINE [[id number] Custom] Follow-up Instructions Return 2-3 weeks, for f/u results. Referral Information Referral ID Referred By Referred To  
  
 [id number] [place] [address] [address] [facility] [address] [place], [address] [facility] Visits Status Start Date End Date 1 New Request 4/12/17 4/12/18 If your referral has a status of pending review or denied, additional information will be sent to support the outcome of this decision. Patient Instructions Iron-Rich Diet: Care Instructions Your Care Instructions Your body needs iron to make hemoglobin. Hemoglobin is a substance in red blood cells that carries oxygen from the lungs to cells all through your body. If you do not get enough iron, your body makes fewer and smaller red blood cells. As a result, your body's cells may not get enough oxygen. Adult men need 8 milligrams of iron a day; adult women need 18 milligrams of iron a day. After menopause, women need 8 milligrams of iron a day. A pregnant woman needs 27 milligrams of iron a day. Infants and young children have higher iron needs relative to their size than other age groups. People who have lost blood because of ulcers or heavy menstrual periods may become very low in iron and may develop anemia. Most people can get the iron their bodies need by eating enough of certain iron-rich foods. Your doctor may recommend that you take an iron supplement along with eating an iron-rich diet. Follow-up care is a key part of your treatment and safety. Be sure to make and go to all appointments, and call your doctor if you are having problems. Its also a good idea to know your test results and keep a list of the medicines you take. How can you care for yourself at home? · Make iron-rich foods a part of your daily diet. Iron-rich foods include: ¨ All meats, such as chicken, beef, lamb, pork, fish, and shellfish. Liver is especially high in iron. ¨ Leafy green vegetables. ¨ Raisins, peas, beans, lentils, barley, and eggs. ¨ Iron-fortified breakfast cereals. · Eat foods with vitamin C along with iron-rich foods. Vitamin C helps you absorb more iron from food. Drink a glass of orange juice or another citrus juice with your food. · Eat meat and vegetables or grains together. The iron in meat helps your body absorb the iron in other foods. Where can you learn more? Go to http://sohail-geoffrey.info/. Enter [id number] [id number] in the search box to learn more about \"Iron-Rich Diet: Care Instructions. \" Current as of: July 26, 2016 Content Version: 11.2 © 8970-7587 [employer]. Care instructions adapted under license by [employer] (which disclaims liability or warranty for this information). If you have questions about a medical condition or this instruction, always ask your healthcare professional. [place] [address] any warranty or liability for your use of this information. Introducing [facility]! Arturo Mccloud introduces Played patient portal. Now you can access parts of your medical record, email your doctor's office, and request medication refills online. 1. In your internet browser, go to https://Flash [employer]. InStore Audio Network. Mobspire/[employer] 2. Click on the First Time User? Click Here link in the Sign In box. You will see the New Member Sign Up page. 3. Enter your [facility] Access Code exactly as it appears below. You will not need to use this code after youve completed the sign-up process. If you do not sign up before the expiration date, you must request a new code. · [facility] Access Code: [email or web address] Expires: 5/27/2017  3:36 PM 
 
4. Enter the last four digits of your Social Security Number (xxxx) and Date of Birth (mm/dd/yyyy) as indicated and click Submit. You will be taken to the next sign-up page. 5. Create a [facility] ID. This will be your [facility] login ID and cannot be changed, so think of one that is secure and easy to remember. 6. Create a [facility] password. You can change your password at any time. 7. Enter your Password Reset Question and Answer. This can be used at a later time if you forget your password. 8. Enter your e-mail address. You will receive e-mail notification when new information is available in [address] 9. Click Sign Up. You can now view and download portions of your medical record. 10. Click the Download Summary menu link to download a portable copy of your medical information. If you have questions, please visit the Frequently Asked Questions section of the [facility] website. Remember, [facility] is NOT to be used for urgent needs. For medical emergencies, dial 911. Now available from your iPhone and Android! Please provide this summary of care documentation to your next provider. Your primary care clinician is listed as Tyrese Hermosillo. If you have any questions after today's visit, please call [phone].

## 2017-04-12 NOTE — PROGRESS NOTES
Chief Complaint   Patient presents with   [facility].     has staples in stomach from surgery on 4/4/17 needs PCP to look at them

## 2017-04-13 LAB
ALBUMIN SERPL-MCNC: 3.9 G/DL (ref 3.5–5.5)
ALBUMIN/GLOB SERPL: 1.3 {RATIO} (ref 1.2–2.2)
ALP SERPL-CCNC: 84 IU/L (ref 39–117)
ALT SERPL-CCNC: 10 IU/L (ref 0–32)
APPEARANCE UR: CLEAR
AST SERPL-CCNC: 12 IU/L (ref 0–40)
BACTERIA #/AREA URNS HPF: NORMAL /[HPF]
BILIRUB SERPL-MCNC: 0.3 MG/DL (ref 0–1.2)
BILIRUB UR QL STRIP: NEGATIVE
BUN SERPL-MCNC: 10 MG/DL (ref 6–20)
BUN/CREAT SERPL: 17 (ref 9–23)
CALCIUM SERPL-MCNC: 9.3 MG/DL (ref 8.7–10.2)
CASTS URNS QL MICRO: NORMAL /LPF
CHLORIDE SERPL-SCNC: 100 MMOL/L (ref 96–106)
CHOLEST SERPL-MCNC: 182 MG/DL (ref 100–199)
CO2 SERPL-SCNC: 23 MMOL/L (ref 18–29)
COLOR UR: YELLOW
CREAT SERPL-MCNC: 0.6 MG/DL (ref 0.57–1)
EPI CELLS #/AREA URNS HPF: NORMAL /HPF
ERYTHROCYTE [DISTWIDTH] IN BLOOD BY AUTOMATED COUNT: 15.4 % (ref 12.3–15.4)
EST. AVERAGE GLUCOSE BLD GHB EST-MCNC: 114 MG/DL
GLOBULIN SER CALC-MCNC: 2.9 G/DL (ref 1.5–4.5)
GLUCOSE SERPL-MCNC: 86 MG/DL (ref 65–99)
GLUCOSE UR QL: NEGATIVE
HBA1C MFR BLD: 5.6 % (ref 4.8–5.6)
HCT VFR BLD AUTO: 30.2 % (ref 34–46.6)
HDLC SERPL-MCNC: 54 MG/DL
HGB BLD-MCNC: 9.2 G/DL (ref 11.1–15.9)
HGB UR QL STRIP: ABNORMAL
IRON SATN MFR SERPL: 4 % (ref 15–55)
IRON SERPL-MCNC: 14 UG/DL (ref 27–159)
KETONES UR QL STRIP: NEGATIVE
LDLC SERPL CALC-MCNC: 115 MG/DL (ref 0–99)
LEUKOCYTE ESTERASE UR QL STRIP: NEGATIVE
MCH RBC QN AUTO: 23.8 PG (ref 26.6–33)
MCHC RBC AUTO-ENTMCNC: 30.5 G/DL (ref 31.5–35.7)
MCV RBC AUTO: 78 FL (ref 79–97)
MICRO URNS: ABNORMAL
MUCOUS THREADS URNS QL MICRO: PRESENT
NITRITE UR QL STRIP: NEGATIVE
PH UR STRIP: 6.5 [PH] (ref 5–7.5)
PLATELET # BLD AUTO: 572 X10E3/UL (ref 150–379)
POTASSIUM SERPL-SCNC: 4.7 MMOL/L (ref 3.5–5.2)
PROT SERPL-MCNC: 6.8 G/DL (ref 6–8.5)
PROT UR QL STRIP: NEGATIVE
RBC # BLD AUTO: 3.86 X10E6/UL (ref 3.77–5.28)
RBC #/AREA URNS HPF: NORMAL /HPF
SODIUM SERPL-SCNC: 139 MMOL/L (ref 134–144)
SP GR UR: 1.02 (ref 1–1.03)
TIBC SERPL-MCNC: 326 UG/DL (ref 250–450)
TRIGL SERPL-MCNC: 63 MG/DL (ref 0–149)
UIBC SERPL-MCNC: 312 UG/DL (ref 131–425)
URINALYSIS REFLEX, 377202: ABNORMAL
UROBILINOGEN UR STRIP-MCNC: 1 MG/DL (ref 0.2–1)
VLDLC SERPL CALC-MCNC: 13 MG/DL (ref 5–40)
WBC # BLD AUTO: 9.6 X10E3/UL (ref 3.4–10.8)
WBC #/AREA URNS HPF: NORMAL /HPF

## 2017-05-03 ENCOUNTER — OFFICE VISIT (OUTPATIENT)
Dept: FAMILY MEDICINE CLINIC | Age: 22
End: 2017-05-03

## 2017-05-03 VITALS
RESPIRATION RATE: 20 BRPM | HEIGHT: 63 IN | SYSTOLIC BLOOD PRESSURE: 116 MMHG | OXYGEN SATURATION: 98 % | DIASTOLIC BLOOD PRESSURE: 66 MMHG | HEART RATE: 91 BPM | TEMPERATURE: 98.3 F | WEIGHT: 225.4 LBS | BODY MASS INDEX: 39.94 KG/M2

## 2017-05-03 DIAGNOSIS — D50.9 MICROCYTIC HYPOCHROMIC ANEMIA: Primary | ICD-10-CM

## 2017-05-03 NOTE — MR AVS SNAPSHOT
Visit Information Date & Time Provider Department Dept. Phone Encounter #  
 5/3/2017  8:30 AM Debbie Maldonado MD [facility] at [address] [id number] Follow-up Instructions Return in about 3 months (around 8/3/2017), or if symptoms worsen or fail to improve, for routine follow up. Upcoming Health Maintenance Date Due Hepatitis A Peds Age 1-18 (1 of 2 - Standard Series) 4/4/1998 HPV AGE 9Y-26Y (1 of 3 - Female 3 Dose Series) 4/4/2008 INFLUENZA AGE 9 TO ADULT 8/1/2017 DTaP/Tdap/Td series (7 - Td) 8/6/2018 Allergies as of 5/3/2017  Review Complete On: 5/3/2017 By: Ingrid Ayala LPN No Known Allergies Current Immunizations  Never Reviewed Name Date DTAP Vaccine 8/28/2001, 8/19/1998, 1997, 1997, 1997 HIB Vaccine 8/19/1998, 1997, 1997, 1997 Hepatitis B Vaccine 1997, 1997, 1997 IPV 8/28/2001, 1997, 1997, 1997 Influenza Vaccine 10/31/2014 Influenza Vaccine Split 10/20/2012 MMR Vaccine 8/28/2001, 8/18/1998 TDAP Vaccine 8/6/2008 Not reviewed this visit You Were Diagnosed With   
  
 Codes Comments Microcytic hypochromic anemia    -  Primary ICD-10-CM: D50.9 ICD-9-CM: 280.9 Vitals BP Pulse Temp Resp Height(growth percentile) Weight(growth percentile) 116/66 (BP 1 Location: Right arm, BP Patient Position: Sitting) 91 98.3 °F (36.8 °C) (Oral) 20 5' 3\" (1.6 m) 225 lb 6.4 oz (102.2 kg) LMP SpO2 BMI OB Status Smoking Status 04/07/2017 98% 39.93 kg/m2 Having regular periods Never Smoker Vitals History BMI and BSA Data Body Mass Index Body Surface Area  
 39.93 kg/m 2 2.13 m 2 Preferred Pharmacy Pharmacy Name Phone [id number] [place], [address] [place] [phone] Your Updated Medication List  
  
   
 This list is accurate as of: 5/3/17  9:15 AM.  Always use your most recent med list.  
  
  
  
  
 ferrous sulfate 134 mg (27 mg iron) Tab Take 1 Tab by mouth two (2) times a day. Prescriptions Sent to Pharmacy Refills  
 ferrous sulfate 134 mg (27 mg iron) tab 5 Sig: Take 1 Tab by mouth two (2) times a day. Class: Normal  
 Pharmacy: [facility] [address] [place] [address] #: [phone] Route: Oral  
  
Follow-up Instructions Return in about 3 months (around 8/3/2017), or if symptoms worsen or fail to improve, for routine follow up. Patient Instructions Iron-Rich Diet: Care Instructions Your Care Instructions Your body needs iron to make hemoglobin. Hemoglobin is a substance in red blood cells that carries oxygen from the lungs to cells all through your body. If you do not get enough iron, your body makes fewer and smaller red blood cells. As a result, your body's cells may not get enough oxygen. Adult men need 8 milligrams of iron a day; adult women need 18 milligrams of iron a day. After menopause, women need 8 milligrams of iron a day. A pregnant woman needs 27 milligrams of iron a day. Infants and young children have higher iron needs relative to their size than other age groups. People who have lost blood because of ulcers or heavy menstrual periods may become very low in iron and may develop anemia. Most people can get the iron their bodies need by eating enough of certain iron-rich foods. Your doctor may recommend that you take an iron supplement along with eating an iron-rich diet. Follow-up care is a key part of your treatment and safety. Be sure to make and go to all appointments, and call your doctor if you are having problems. Its also a good idea to know your test results and keep a list of the medicines you take. How can you care for yourself at home? · Make iron-rich foods a part of your daily diet. Iron-rich foods include: ¨ All meats, such as chicken, beef, lamb, pork, fish, and shellfish. Liver is especially high in iron. ¨ Leafy green vegetables. ¨ Raisins, peas, beans, lentils, barley, and eggs. ¨ Iron-fortified breakfast cereals. · Eat foods with vitamin C along with iron-rich foods. Vitamin C helps you absorb more iron from food. Drink a glass of orange juice or another citrus juice with your food. · Eat meat and vegetables or grains together. The iron in meat helps your body absorb the iron in other foods. Where can you learn more? Go to http://sohail-geoffrey.info/. Enter [id number] [id number] in the search box to learn more about \"Iron-Rich Diet: Care Instructions. \" Current as of: July 26, 2016 Content Version: 11.2 © 2290-4949 [employer]. Care instructions adapted under license by [employer] (which disclaims liability or warranty for this information). If you have questions about a medical condition or this instruction, always ask your healthcare professional. [place] [address] any warranty or liability for your use of this information. Introducing [facility]! [facility] introduces [employer] patient portal. Now you can access parts of your medical record, email your doctor's office, and request medication refills online. 1. In your internet browser, go to [email or web address] 2. Click on the First Time User? Click Here link in the Sign In box. You will see the New Member Sign Up page. 3. Enter your [employer] Systems Access Code exactly as it appears below. You will not need to use this code after youve completed the sign-up process. If you do not sign up before the expiration date, you must request a new code. · [employer] Access Code: [email or web address] Expires: 5/27/2017  3:36 PM 
 
4.  Enter the last four digits of your Social Security Number (xxxx) and Date of Birth (mm/dd/yyyy) as indicated and click Submit. You will be taken to the next sign-up page. 5. Create a Graduateland ID. This will be your Graduateland login ID and cannot be changed, so think of one that is secure and easy to remember. 6. Create a Graduateland password. You can change your password at any time. 7. Enter your Password Reset Question and Answer. This can be used at a later time if you forget your password. 8. Enter your e-mail address. You will receive e-mail notification when new information is available in [address] 9. Click Sign Up. You can now view and download portions of your medical record. 10. Click the Download Summary menu link to download a portable copy of your medical information. If you have questions, please visit the Frequently Asked Questions section of the Gradu[employer] website. Remember, Gradu[employer] is NOT to be used for urgent needs. For medical emergencies, dial 911. Now available from your iPhone and Android! Please provide this summary of care documentation to your next provider. Your primary care clinician is listed as Lety Wright. If you have any questions after today's visit, please call [phone].

## 2017-05-03 NOTE — PATIENT INSTRUCTIONS
Iron-Rich Diet: Care Instructions  Your Care Instructions  Your body needs iron to make hemoglobin. Hemoglobin is a substance in red blood cells that carries oxygen from the lungs to cells all through your body. If you do not get enough iron, your body makes fewer and smaller red blood cells. As a result, your body's cells may not get enough oxygen. Adult men need 8 milligrams of iron a day; adult women need 18 milligrams of iron a day. After menopause, women need 8 milligrams of iron a day. A pregnant woman needs 27 milligrams of iron a day. Infants and young children have higher iron needs relative to their size than other age groups. People who have lost blood because of ulcers or heavy menstrual periods may become very low in iron and may develop anemia. Most people can get the iron their bodies need by eating enough of certain iron-rich foods. Your doctor may recommend that you take an iron supplement along with eating an iron-rich diet. Follow-up care is a key part of your treatment and safety. Be sure to make and go to all appointments, and call your doctor if you are having problems. Its also a good idea to know your test results and keep a list of the medicines you take. How can you care for yourself at home? · Make iron-rich foods a part of your daily diet. Iron-rich foods include:  ¨ All meats, such as chicken, beef, lamb, pork, fish, and shellfish. Liver is especially high in iron. ¨ Leafy green vegetables. ¨ Raisins, peas, beans, lentils, barley, and eggs. ¨ Iron-fortified breakfast cereals. · Eat foods with vitamin C along with iron-rich foods. Vitamin C helps you absorb more iron from food. Drink a glass of orange juice or another citrus juice with your food. · Eat meat and vegetables or grains together. The iron in meat helps your body absorb the iron in other foods. Where can you learn more? Go to http://sohail-geoffrey.info/.   Enter [id number] in the search box to learn more about \"Iron-Rich Diet: Care Instructions. \"  Current as of: July 26, 2016  Content Version: 11.2  © 8567-8192 [employer], [employer]. Care instructions adapted under license by [employer] (which disclaims liability or warranty for this information). If you have questions about a medical condition or this instruction, always ask your healthcare professional. [address] any warranty or liability for your use of this information.

## 2017-05-03 NOTE — PROGRESS NOTES
1. Have you been to the ER, urgent care clinic since your last visit? Hospitalized since your last visit? No    2. Have you seen or consulted any other health care providers outside of the [address] [place] [address] since your last visit? Include any pap smears or colon screening.  No     Pt is here for lab results

## 2017-05-03 NOTE — LETTER
5/3/2017 9:06 AM 
 
Ms. Pam Bridges [address] [facility] [phone] Dear Pam Bridges: 
 
Please find your most recent results below. Results shows iron deficiency anemia. Resulted Orders CBC W/O DIFF Result Value Ref Range WBC 9.6 3.4 - 10.8 x10E3/uL  
 RBC 3.86 3.77 - 5.28 x10E6/uL HGB 9.2 (L) 11.1 - 15.9 g/dL HCT 30.2 (L) 34.0 - 46.6 % MCV 78 (L) 79 - 97 fL  
 MCH 23.8 (L) 26.6 - 33.0 pg  
 MCHC 30.5 (L) 31.5 - 35.7 g/dL  
 RDW 15.4 12.3 - 15.4 % PLATELET 164 (H) 138 - 379 x10E3/uL Narrative Performed at:  [address]  [id number] : Driss Fontana MD, Phone:  [phone] IRON PROFILE Result Value Ref Range TIBC 326 250 - 450 ug/dL UIBC 312 131 - 425 ug/dL Iron 14 (L) 27 - 159 ug/dL Iron % saturation 4 (LL) 15 - 55 % Narrative Performed at:  [address]  [id number] : Driss Fontana MD, Phone:  [phone] METABOLIC PANEL, COMPREHENSIVE Result Value Ref Range Glucose 86 65 - 99 mg/dL BUN 10 6 - 20 mg/dL Creatinine 0.60 0.57 - 1.00 mg/dL GFR est non- >59 mL/min/1.73 GFR est  >59 mL/min/1.73  
 BUN/Creatinine ratio 17 9 - 23 Sodium 139 134 - 144 mmol/L Potassium 4.7 3.5 - 5.2 mmol/L Chloride 100 96 - 106 mmol/L  
 CO2 23 18 - 29 mmol/L Calcium 9.3 8.7 - 10.2 mg/dL Protein, total 6.8 6.0 - 8.5 g/dL Albumin 3.9 3.5 - 5.5 g/dL GLOBULIN, TOTAL 2.9 1.5 - 4.5 g/dL A-G Ratio 1.3 1.2 - 2.2 Bilirubin, total 0.3 0.0 - 1.2 mg/dL Alk. phosphatase 84 39 - 117 IU/L  
 AST (SGOT) 12 0 - 40 IU/L  
 ALT (SGPT) 10 0 - 32 IU/L Narrative Performed at:  [address]  [id number] : Driss Fontana MD, Phone:  [phone] LIPID PANEL Result Value Ref Range Cholesterol, total 182 100 - 199 mg/dL Triglyceride 63 0 - 149 mg/dL HDL Cholesterol 54 >39 mg/dL VLDL, calculated 13 5 - 40 mg/dL LDL, calculated 115 (H) 0 - 99 mg/dL Narrative Performed at:  [address]  [id number] : Miriam Rasmussen MD, Phone:  [phone] UA/M W/RFLX CULTURE, ROUTINE Result Value Ref Range Specific Gravity 1.019 1.005 - 1.030  
 pH (UA) 6.5 5.0 - 7.5 Color Yellow Yellow Appearance Clear Clear Leukocyte Esterase Negative Negative Protein Negative Negative/Trace Glucose Negative Negative Ketone Negative Negative Blood Trace (A) Negative Bilirubin Negative Negative Urobilinogen 1.0 0.2 - 1.0 mg/dL Nitrites Negative Negative Microscopic Examination See additional order Comment:  
   Microscopic was indicated and was performed. URINALYSIS REFLEX Comment Comment: This specimen will not reflex to a Urine Culture. Narrative Performed at:  [address]  [id number] : Miriam Rasmussen MD, Phone:  [phone] HEMOGLOBIN A1C WITH EAG Result Value Ref Range Hemoglobin A1c 5.6 4.8 - 5.6 % Comment:  
            Pre-diabetes: 5.7 - 6.4 Diabetes: >6.4 Glycemic control for adults with diabetes: <7.0 Estimated average glucose 114 mg/dL Narrative Performed at:  [address]  [id number] : Miriam Rasmussen MD, Phone:  [phone] MICROSCOPIC EXAMINATION Result Value Ref Range WBC 0-5 0 - 5 /hpf  
 RBC 0-2 0 - 2 /hpf Epithelial cells 0-10 0 - 10 /hpf Casts None seen None seen /lpf Mucus Present Not Estab. Bacteria None seen None seen/Few Narrative Performed at:  [address]  [id number] : Miriam Rasmussen MD, Phone:  [phone] RECOMMENDATIONS: 
Continue with current  medications. Results reviewed in clinic Please call me if you have any questions: [phone] Sincerely, Clay Castellanos MD

## 2017-05-03 NOTE — PROGRESS NOTES
Chief Complaint   Patient presents with    Results     HPI:  Esther Kim is a 23 y.o. AA female presents to review results. Results shows fe=14, sat%=4, indicating iron deficiency anemia  Result and management have been discussed. Patient agreed to start treatment.     Review of Systems  As per hpi    Past Medical History:   Diagnosis Date    Abdominal pain, other specified site     Keratosis pilaris 10/1/2009    Otitis media      Past Surgical History:   Procedure Laterality Date    HX ADENOIDECTOMY      HX GYN      HX HEENT      tubal/tonsils and adnoids    HX TONSILLECTOMY      HX TYMPANOSTOMY       Social History    Marital status: SINGLE     Spouse name: N/A    Number of children: N/A    Years of education: N/A     Social History Main Topics    Smoking status: Never Smoker    Smokeless tobacco: Never Used    Alcohol use No    Drug use: No    Sexual activity: No     No Known Allergies    Objective:  Visit Vitals    /66 (BP 1 Location: Right arm, BP Patient Position: Sitting)    Pulse 91    Temp 98.3 °F (36.8 °C) (Oral)    Resp 20    Ht 5' 3\" (1.6 m)    Wt 225 lb 6.4 oz (102.2 kg)    LMP 04/07/2017    SpO2 98%    BMI 39.93 kg/m2     Physical Exam:   General appearance - alert, well appearing, in no distress  Mental status - alert, oriented to person, place, and time  EYE-PERRL, EOMI  Neck - supple, no significant adenopathy   Chest - clear to auscultation, no wheezes, rales or rhonchi   Heart - normal rate, regular rhythm, normal   Abdomen - soft, nontender, nondistended, no organomegaly  Ext-peripheral pulses normal, no pedal edema  Neuro -alert, oriented, normal speech, no focal findings    Results for orders placed or performed in visit on 04/12/17   CBC W/O DIFF   Result Value Ref Range    WBC 9.6 3.4 - 10.8 x10E3/uL    RBC 3.86 3.77 - 5.28 x10E6/uL    HGB 9.2 (L) 11.1 - 15.9 g/dL    HCT 30.2 (L) 34.0 - 46.6 %    MCV 78 (L) 79 - 97 fL    MCH 23.8 (L) 26.6 - 33.0 pg    MCHC 30.5 (L) 31.5 - 35.7 g/dL    RDW 15.4 12.3 - 15.4 %    PLATELET 132 (H) 660 - 379 x10E3/uL   IRON PROFILE   Result Value Ref Range    TIBC 326 250 - 450 ug/dL    UIBC 312 131 - 425 ug/dL    Iron 14 (L) 27 - 159 ug/dL    Iron % saturation 4 (LL) 15 - 55 %   METABOLIC PANEL, COMPREHENSIVE   Result Value Ref Range    Glucose 86 65 - 99 mg/dL    BUN 10 6 - 20 mg/dL    Creatinine 0.60 0.57 - 1.00 mg/dL    GFR est non- >59 mL/min/1.73    GFR est  >59 mL/min/1.73    BUN/Creatinine ratio 17 9 - 23    Sodium 139 134 - 144 mmol/L    Potassium 4.7 3.5 - 5.2 mmol/L    Chloride 100 96 - 106 mmol/L    CO2 23 18 - 29 mmol/L    Calcium 9.3 8.7 - 10.2 mg/dL    Protein, total 6.8 6.0 - 8.5 g/dL    Albumin 3.9 3.5 - 5.5 g/dL    GLOBULIN, TOTAL 2.9 1.5 - 4.5 g/dL    A-G Ratio 1.3 1.2 - 2.2    Bilirubin, total 0.3 0.0 - 1.2 mg/dL    Alk.  phosphatase 84 39 - 117 IU/L    AST (SGOT) 12 0 - 40 IU/L    ALT (SGPT) 10 0 - 32 IU/L   LIPID PANEL   Result Value Ref Range    Cholesterol, total 182 100 - 199 mg/dL    Triglyceride 63 0 - 149 mg/dL    HDL Cholesterol 54 >39 mg/dL    VLDL, calculated 13 5 - 40 mg/dL    LDL, calculated 115 (H) 0 - 99 mg/dL   UA/M W/RFLX CULTURE, ROUTINE   Result Value Ref Range    Specific Gravity 1.019 1.005 - 1.030    pH (UA) 6.5 5.0 - 7.5    Color Yellow Yellow    Appearance Clear Clear    Leukocyte Esterase Negative Negative    Protein Negative Negative/Trace    Glucose Negative Negative    Ketone Negative Negative    Blood Trace (A) Negative    Bilirubin Negative Negative    Urobilinogen 1.0 0.2 - 1.0 mg/dL    Nitrites Negative Negative    Microscopic Examination See additional order     URINALYSIS REFLEX Comment    HEMOGLOBIN A1C WITH EAG   Result Value Ref Range    Hemoglobin A1c 5.6 4.8 - 5.6 %    Estimated average glucose 114 mg/dL   MICROSCOPIC EXAMINATION   Result Value Ref Range    WBC 0-5 0 - 5 /hpf    RBC 0-2 0 - 2 /hpf    Epithelial cells 0-10 0 - 10 /hpf    Casts None seen None seen /lpf    Mucus Present Not Estab. Bacteria None seen None seen/Few     Assessment/Plan:    ICD-10-CM ICD-9-CM    1. Microcytic hypochromic anemia D50.9 280.9 ferrous sulfate 134 mg (27 mg iron) tab     Patient Instructions        Iron-Rich Diet: Care Instructions  Your Care Instructions  Your body needs iron to make hemoglobin. Hemoglobin is a substance in red blood cells that carries oxygen from the lungs to cells all through your body. If you do not get enough iron, your body makes fewer and smaller red blood cells. As a result, your body's cells may not get enough oxygen. Adult men need 8 milligrams of iron a day; adult women need 18 milligrams of iron a day. After menopause, women need 8 milligrams of iron a day. A pregnant woman needs 27 milligrams of iron a day. Infants and young children have higher iron needs relative to their size than other age groups. People who have lost blood because of ulcers or heavy menstrual periods may become very low in iron and may develop anemia. Most people can get the iron their bodies need by eating enough of certain iron-rich foods. Your doctor may recommend that you take an iron supplement along with eating an iron-rich diet. Follow-up care is a key part of your treatment and safety. Be sure to make and go to all appointments, and call your doctor if you are having problems. Its also a good idea to know your test results and keep a list of the medicines you take. How can you care for yourself at home? · Make iron-rich foods a part of your daily diet. Iron-rich foods include:  ¨ All meats, such as chicken, beef, lamb, pork, fish, and shellfish. Liver is especially high in iron. ¨ Leafy green vegetables. ¨ Raisins, peas, beans, lentils, barley, and eggs. ¨ Iron-fortified breakfast cereals. · Eat foods with vitamin C along with iron-rich foods. Vitamin C helps you absorb more iron from food. Drink a glass of orange juice or another citrus juice with your food.   · Eat meat and vegetables or grains together. The iron in meat helps your body absorb the iron in other foods. Where can you learn more? Go to http://sohail-geoffrey.info/. Enter [id number] in the search box to learn more about \"Iron-Rich Diet: Care Instructions. \"  Current as of: July 26, 2016  Content Version: 11.2  © 1106-3603 [employer]. Care instructions adapted under license by [employer] (which disclaims liability or warranty for this information). If you have questions about a medical condition or this instruction, always ask your healthcare professional. [place] [address] any warranty or liability for your use of this information. Follow-up Disposition:  Return in about 3 months (around 8/3/2017), or if symptoms worsen or fail to improve, for routine follow up.

## 2018-03-14 ENCOUNTER — HOSPITAL ENCOUNTER (EMERGENCY)
Age: 23
Discharge: HOME | End: 2018-03-15
Attending: EMERGENCY MEDICINE
Payer: SELF-PAY

## 2018-03-14 DIAGNOSIS — R11.2 NON-INTRACTABLE VOMITING WITH NAUSEA, UNSPECIFIED VOMITING TYPE: Primary | ICD-10-CM

## 2018-03-14 DIAGNOSIS — N30.00 ACUTE CYSTITIS WITHOUT HEMATURIA: ICD-10-CM

## 2018-03-14 DIAGNOSIS — Z3A.10 10 WEEKS GESTATION OF PREGNANCY: ICD-10-CM

## 2018-03-14 LAB
BASOPHILS # BLD: 0 K/UL (ref 0–0.1)
BASOPHILS NFR BLD: 0 % (ref 0–1)
DIFFERENTIAL METHOD BLD: ABNORMAL
EOSINOPHIL # BLD: 0.2 K/UL (ref 0–0.4)
EOSINOPHIL NFR BLD: 2 % (ref 0–7)
ERYTHROCYTE [DISTWIDTH] IN BLOOD BY AUTOMATED COUNT: 17.2 % (ref 11.5–14.5)
HCT VFR BLD AUTO: 37 % (ref 35–47)
HGB BLD-MCNC: 11.5 G/DL (ref 11.5–16)
IMM GRANULOCYTES # BLD: 0.1 K/UL (ref 0–0.04)
IMM GRANULOCYTES NFR BLD AUTO: 0 % (ref 0–0.5)
LYMPHOCYTES # BLD: 1.6 K/UL (ref 0.8–3.5)
LYMPHOCYTES NFR BLD: 13 % (ref 12–49)
MCH RBC QN AUTO: 22.3 PG (ref 26–34)
MCHC RBC AUTO-ENTMCNC: 31.1 G/DL (ref 30–36.5)
MCV RBC AUTO: 71.7 FL (ref 80–99)
MONOCYTES # BLD: 0.5 K/UL (ref 0–1)
MONOCYTES NFR BLD: 4 % (ref 5–13)
NEUTS SEG # BLD: 10.2 K/UL (ref 1.8–8)
NEUTS SEG NFR BLD: 81 % (ref 32–75)
NRBC # BLD: 0 K/UL (ref 0–0.01)
NRBC BLD-RTO: 0 PER 100 WBC
PLATELET # BLD AUTO: 542 K/UL (ref 150–400)
PMV BLD AUTO: 10 FL (ref 8.9–12.9)
RBC # BLD AUTO: 5.16 M/UL (ref 3.8–5.2)
WBC # BLD AUTO: 12.5 K/UL (ref 3.6–11)

## 2018-03-14 PROCEDURE — 81001 URINALYSIS AUTO W/SCOPE: CPT | Performed by: EMERGENCY MEDICINE

## 2018-03-14 PROCEDURE — 87186 SC STD MICRODIL/AGAR DIL: CPT | Performed by: EMERGENCY MEDICINE

## 2018-03-14 PROCEDURE — 87086 URINE CULTURE/COLONY COUNT: CPT | Performed by: EMERGENCY MEDICINE

## 2018-03-14 PROCEDURE — 99285 EMERGENCY DEPT VISIT HI MDM: CPT

## 2018-03-14 PROCEDURE — 83690 ASSAY OF LIPASE: CPT | Performed by: EMERGENCY MEDICINE

## 2018-03-14 PROCEDURE — 96361 HYDRATE IV INFUSION ADD-ON: CPT

## 2018-03-14 PROCEDURE — 87077 CULTURE AEROBIC IDENTIFY: CPT | Performed by: EMERGENCY MEDICINE

## 2018-03-14 PROCEDURE — 36415 COLL VENOUS BLD VENIPUNCTURE: CPT | Performed by: EMERGENCY MEDICINE

## 2018-03-14 PROCEDURE — 74011250636 HC RX REV CODE- 250/636: Performed by: EMERGENCY MEDICINE

## 2018-03-14 PROCEDURE — 94762 N-INVAS EAR/PLS OXIMTRY CONT: CPT

## 2018-03-14 PROCEDURE — 80053 COMPREHEN METABOLIC PANEL: CPT | Performed by: EMERGENCY MEDICINE

## 2018-03-14 PROCEDURE — 85025 COMPLETE CBC W/AUTO DIFF WBC: CPT | Performed by: EMERGENCY MEDICINE

## 2018-03-14 PROCEDURE — 84702 CHORIONIC GONADOTROPIN TEST: CPT | Performed by: EMERGENCY MEDICINE

## 2018-03-14 RX ORDER — ONDANSETRON 2 MG/ML
4 INJECTION INTRAMUSCULAR; INTRAVENOUS
Status: COMPLETED | OUTPATIENT
Start: 2018-03-14 | End: 2018-03-14

## 2018-03-14 RX ADMIN — ONDANSETRON HYDROCHLORIDE 4 MG: 2 INJECTION, SOLUTION INTRAMUSCULAR; INTRAVENOUS at 22:55

## 2018-03-14 RX ADMIN — SODIUM CHLORIDE 1000 ML: 900 INJECTION, SOLUTION INTRAVENOUS at 22:55

## 2018-03-14 NOTE — LETTER
[facility] EMERGENCY DEPT 
[address] [place] [address]. [address] [phone] 
[phone] Work/School Note Date: 3/14/2018 To Whom It May concern: 
 
Burgess Arroyo was seen and treated today in the emergency room by the following provider(s): 
Attending Provider: Andrew Fuller MD.   
 
Burgess Arroyo may return to work on 3/16/18.  
 
Sincerely, 
 
 
 
 
Andrew Fuller MD

## 2018-03-15 ENCOUNTER — APPOINTMENT (OUTPATIENT)
Dept: ULTRASOUND IMAGING | Age: 23
End: 2018-03-15
Attending: EMERGENCY MEDICINE
Payer: SELF-PAY

## 2018-03-15 VITALS
WEIGHT: 236.99 LBS | HEIGHT: 63 IN | HEART RATE: 88 BPM | TEMPERATURE: 98.6 F | OXYGEN SATURATION: 100 % | BODY MASS INDEX: 41.99 KG/M2 | RESPIRATION RATE: 18 BRPM | DIASTOLIC BLOOD PRESSURE: 59 MMHG | SYSTOLIC BLOOD PRESSURE: 104 MMHG

## 2018-03-15 LAB
ALBUMIN SERPL-MCNC: 3.4 G/DL (ref 3.5–5)
ALBUMIN/GLOB SERPL: 0.7 {RATIO} (ref 1.1–2.2)
ALP SERPL-CCNC: 86 U/L (ref 45–117)
ALT SERPL-CCNC: 18 U/L (ref 12–78)
ANION GAP SERPL CALC-SCNC: 10 MMOL/L (ref 5–15)
APPEARANCE UR: ABNORMAL
AST SERPL-CCNC: 15 U/L (ref 15–37)
BACTERIA URNS QL MICRO: ABNORMAL /HPF
BILIRUB SERPL-MCNC: 0.4 MG/DL (ref 0.2–1)
BILIRUB UR QL: NEGATIVE
BUN SERPL-MCNC: 4 MG/DL (ref 6–20)
BUN/CREAT SERPL: 6 (ref 12–20)
CALCIUM SERPL-MCNC: 9.3 MG/DL (ref 8.5–10.1)
CHLORIDE SERPL-SCNC: 101 MMOL/L (ref 97–108)
CLUE CELLS VAG QL WET PREP: NORMAL
CO2 SERPL-SCNC: 24 MMOL/L (ref 21–32)
COLOR UR: ABNORMAL
CREAT SERPL-MCNC: 0.68 MG/DL (ref 0.55–1.02)
EPITH CASTS URNS QL MICRO: ABNORMAL /LPF
GLOBULIN SER CALC-MCNC: 5.2 G/DL (ref 2–4)
GLUCOSE SERPL-MCNC: 87 MG/DL (ref 65–100)
GLUCOSE UR STRIP.AUTO-MCNC: NEGATIVE MG/DL
HCG SERPL-ACNC: ABNORMAL MIU/ML (ref 0–6)
HGB UR QL STRIP: NEGATIVE
KETONES UR QL STRIP.AUTO: 15 MG/DL
KOH PREP SPEC: NORMAL
LEUKOCYTE ESTERASE UR QL STRIP.AUTO: ABNORMAL
LIPASE SERPL-CCNC: 90 U/L (ref 73–393)
NITRITE UR QL STRIP.AUTO: NEGATIVE
PH UR STRIP: 6.5 [PH] (ref 5–8)
POTASSIUM SERPL-SCNC: 3.7 MMOL/L (ref 3.5–5.1)
PROT SERPL-MCNC: 8.6 G/DL (ref 6.4–8.2)
PROT UR STRIP-MCNC: NEGATIVE MG/DL
RBC #/AREA URNS HPF: ABNORMAL /HPF (ref 0–5)
SERVICE CMNT-IMP: NORMAL
SODIUM SERPL-SCNC: 135 MMOL/L (ref 136–145)
SP GR UR REFRACTOMETRY: 1.02 (ref 1–1.03)
T VAGINALIS VAG QL WET PREP: NORMAL
UA: UC IF INDICATED,UAUC: ABNORMAL
UROBILINOGEN UR QL STRIP.AUTO: 1 EU/DL (ref 0.2–1)
WBC URNS QL MICRO: ABNORMAL /HPF (ref 0–4)

## 2018-03-15 PROCEDURE — 96365 THER/PROPH/DIAG IV INF INIT: CPT

## 2018-03-15 PROCEDURE — 87210 SMEAR WET MOUNT SALINE/INK: CPT | Performed by: EMERGENCY MEDICINE

## 2018-03-15 PROCEDURE — 74011000258 HC RX REV CODE- 258: Performed by: EMERGENCY MEDICINE

## 2018-03-15 PROCEDURE — 74011000250 HC RX REV CODE- 250: Performed by: EMERGENCY MEDICINE

## 2018-03-15 PROCEDURE — 74011250636 HC RX REV CODE- 250/636: Performed by: EMERGENCY MEDICINE

## 2018-03-15 PROCEDURE — 96361 HYDRATE IV INFUSION ADD-ON: CPT

## 2018-03-15 PROCEDURE — 74011250637 HC RX REV CODE- 250/637: Performed by: EMERGENCY MEDICINE

## 2018-03-15 PROCEDURE — 87491 CHLMYD TRACH DNA AMP PROBE: CPT | Performed by: EMERGENCY MEDICINE

## 2018-03-15 PROCEDURE — 76801 OB US < 14 WKS SINGLE FETUS: CPT

## 2018-03-15 RX ORDER — NITROFURANTOIN 25; 75 MG/1; MG/1
100 CAPSULE ORAL 2 TIMES DAILY
Qty: 14 CAP | Refills: 0 | Status: SHIPPED | OUTPATIENT
Start: 2018-03-15 | End: 2018-03-22

## 2018-03-15 RX ORDER — CEFTRIAXONE 1 G/1
INJECTION, POWDER, FOR SOLUTION INTRAMUSCULAR; INTRAVENOUS
Status: DISCONTINUED
Start: 2018-03-15 | End: 2018-03-15 | Stop reason: HOSPADM

## 2018-03-15 RX ORDER — FAMOTIDINE 20 MG/1
20 TABLET, FILM COATED ORAL
Status: COMPLETED | OUTPATIENT
Start: 2018-03-15 | End: 2018-03-15

## 2018-03-15 RX ORDER — ONDANSETRON 4 MG/1
4 TABLET, ORALLY DISINTEGRATING ORAL
Qty: 10 TAB | Refills: 0 | Status: SHIPPED | OUTPATIENT
Start: 2018-03-15 | End: 2019-05-13

## 2018-03-15 RX ORDER — LIDOCAINE HYDROCHLORIDE 20 MG/ML
15 SOLUTION OROPHARYNGEAL
Status: COMPLETED | OUTPATIENT
Start: 2018-03-15 | End: 2018-03-15

## 2018-03-15 RX ORDER — SODIUM CHLORIDE 900 MG/100ML
INJECTION INTRAVENOUS
Status: DISCONTINUED
Start: 2018-03-15 | End: 2018-03-15 | Stop reason: HOSPADM

## 2018-03-15 RX ADMIN — CEFTRIAXONE 1 G: 1 INJECTION, POWDER, FOR SOLUTION INTRAMUSCULAR; INTRAVENOUS at 01:56

## 2018-03-15 RX ADMIN — LIDOCAINE HYDROCHLORIDE 15 ML: 20 SOLUTION ORAL; TOPICAL at 03:37

## 2018-03-15 RX ADMIN — ALUMINUM HYDROXIDE AND MAGNESIUM HYDROXIDE 30 ML: 200; 200 SUSPENSION ORAL at 03:37

## 2018-03-15 RX ADMIN — SODIUM CHLORIDE 1000 ML: 900 INJECTION, SOLUTION INTRAVENOUS at 02:41

## 2018-03-15 RX ADMIN — FAMOTIDINE 20 MG: 20 TABLET, FILM COATED ORAL at 03:37

## 2018-03-15 NOTE — ED NOTES
Pt resting in bed. Pt understands and understands pending pelvic exam. RN to return with supplies when able.

## 2018-03-15 NOTE — ED NOTES
Pt returned from U/S at this time. Pt on monitor x 3. Pt c/o pain 10/10, Pt states pain located in \"throat area\". Pt resting in bed. Call bell within reach.

## 2018-03-15 NOTE — ED NOTES
Pt medicated per MAR. Lights off for comfort. Call bell within reach. Pt requests pain medicine for throat irritation. WCTM.

## 2018-03-15 NOTE — DISCHARGE INSTRUCTIONS
Weeks 10 to 14 of Your Pregnancy: Care Instructions  Your Care Instructions    By weeks 10 to 14 of your pregnancy, the placenta has formed inside your uterus. It is possible to hear your baby's heartbeat with a special ultrasound device. Your baby's eyes can and do move. The arms and legs can bend. This is a good time to think about testing for birth defects. There are two types of tests: screening and diagnostic. Screening tests show the chance that a baby has a certain birth defect. They can't tell you for sure that your baby has a problem. Diagnostic tests show if a baby has a certain birth defect. It's your choice whether to have these tests. You and your partner can talk to your doctor or midwife about birth defects tests. Follow-up care is a key part of your treatment and safety. Be sure to make and go to all appointments, and call your doctor if you are having problems. It's also a good idea to know your test results and keep a list of the medicines you take. How can you care for yourself at home? Decide about tests  · You can have screening tests and diagnostic tests to check for birth defects. The decision to have a test for birth defects is personal. Think about your age, your chance of passing on a family disease, your need to know about any problems, and what you might do after you have the test results. ¨ Triple or quadruple (quad) blood tests. These screening tests can be done between 15 and 20 weeks of pregnancy. They check the amounts of three or four substances in your blood. The doctor looks at these test results, along with your age and other factors, to find out the chance that your baby may have certain problems. ¨ Amniocentesis. This diagnostic test is used to look for chromosomal problems in the baby's cells.  It can be done between 15 and 20 weeks of pregnancy, usually around week 16.  ¨ Nuchal translucency test. This test uses ultrasound to measure the thickness of the area at the back of the baby's neck. An increase in the thickness can be an early sign of Down syndrome. ¨ Chorionic villus sampling (CVS). This is a test that looks for certain genetic problems with your baby. The same genes that are in your baby are in the placenta. A small piece of the placenta is taken out and tested. This test is done when you are 10 to 13 weeks pregnant. Ease discomfort  · Slow down and take naps when you feel tired. · If your emotions swing, talk to someone. Crying, anxiety, and concentration problems are common. · If your gums bleed, try a softer toothbrush. If your gums are puffy and bleed a lot, see your dentist.  · If you feel dizzy:  ¨ Get up slowly after sitting or lying down. ¨ Drink plenty of fluids. ¨ Eat small snacks to keep your blood sugar stable. ¨ Put your head between your legs as though you were tying your shoelaces. ¨ Lie down with your legs higher than your head. Use pillows to prop up your feet. · If you have a headache:  ¨ Lie down. ¨ Ask your partner or a good friend for a neck massage. ¨ Try cool cloths over your forehead or across the back of your neck. ¨ Use acetaminophen (Tylenol) for pain relief. Do not use nonsteroidal anti-inflammatory drugs (NSAIDs), such as ibuprofen (Advil, Motrin) or naproxen (Aleve), unless your doctor says it is okay. · If you have a nosebleed, pinch your nose gently, and hold it for a short while. To prevent nosebleeds, try massaging a small dab of petroleum jelly, such as Vaseline, in your nostrils. · If your nose is stuffed up, try saline (saltwater) nose sprays. Do not use decongestant sprays. Care for your breasts  · Wear a bra that gives you good support. · Know that changes in your breasts are normal.  ¨ Your breasts may get larger and more tender. Tenderness usually gets better by 12 weeks. ¨ Your nipples may get darker and larger, and small bumps around your nipples may show more.   ¨ The veins in your chest and breasts may show more. · Don't worry about \"toughening'\" your nipples. Breastfeeding will naturally do this. Where can you learn more? Go to http://sohail-geoffrey.info/. Enter E428 in the search box to learn more about \"Weeks 10 to 14 of Your Pregnancy: Care Instructions. \"  Current as of: March 16, 2017  Content Version: 11.4  © 5757-5498 [employer]. Care instructions adapted under license by [employer] (which disclaims liability or warranty for this information). If you have questions about a medical condition or this instruction, always ask your healthcare professional. [place] [address] any warranty or liability for your use of this information. Nausea and Vomiting: Care Instructions  Your Care Instructions    When you are nauseated, you may feel weak and sweaty and notice a lot of saliva in your mouth. Nausea often leads to vomiting. Most of the time you do not need to worry about nausea and vomiting, but they can be signs of other illnesses. Two common causes of nausea and vomiting are stomach flu and food poisoning. Nausea and vomiting from viral stomach flu will usually start to improve within 24 hours. Nausea and vomiting from food poisoning may last from 12 to 48 hours. The doctor has checked you carefully, but problems can develop later. If you notice any problems or new symptoms, get medical treatment right away. Follow-up care is a key part of your treatment and safety. Be sure to make and go to all appointments, and call your doctor if you are having problems. It's also a good idea to know your test results and keep a list of the medicines you take. How can you care for yourself at home? · To prevent dehydration, drink plenty of fluids, enough so that your urine is light yellow or clear like water. Choose water and other caffeine-free clear liquids until you feel better.  If you have kidney, heart, or liver disease and have to limit fluids, talk with your doctor before you increase the amount of fluids you drink. · Rest in bed until you feel better. · When you are able to eat, try clear soups, mild foods, and liquids until all symptoms are gone for 12 to 48 hours. Other good choices include dry toast, crackers, cooked cereal, and gelatin dessert, such as Jell-O. When should you call for help? Call 911 anytime you think you may need emergency care. For example, call if:  ? · You passed out (lost consciousness). ?Call your doctor now or seek immediate medical care if:  ? · You have symptoms of dehydration, such as:  ¨ Dry eyes and a dry mouth. ¨ Passing only a little dark urine. ¨ Feeling thirstier than usual.   ? · You have new or worsening belly pain. ? · You have a new or higher fever. ? · You vomit blood or what looks like coffee grounds. ? Watch closely for changes in your health, and be sure to contact your doctor if:  ? · You have ongoing nausea and vomiting. ? · Your vomiting is getting worse. ? · Your vomiting lasts longer than 2 days. ? · You are not getting better as expected. Where can you learn more? Go to http://sohail-geoffrey.info/. Enter 25 [id number] in the search box to learn more about \"Nausea and Vomiting: Care Instructions. \"  Current as of: March 20, 2017  Content Version: 11.4  © 0040-5042 [employer]. Care instructions adapted under license by "[employer] (which disclaims liability or warranty for this information). If you have questions about a medical condition or this instruction, always ask your healthcare professional. [place] [address] any warranty or liability for your use of this information. Urinary Tract Infection in Women: Care Instructions  Your Care Instructions    A urinary tract infection, or UTI, is a general term for an infection anywhere between the kidneys and the urethra (where urine comes out). Most UTIs are bladder infections.  They often cause pain or burning when you urinate. UTIs are caused by bacteria and can be cured with antibiotics. Be sure to complete your treatment so that the infection goes away. Follow-up care is a key part of your treatment and safety. Be sure to make and go to all appointments, and call your doctor if you are having problems. It's also a good idea to know your test results and keep a list of the medicines you take. How can you care for yourself at home? · Take your antibiotics as directed. Do not stop taking them just because you feel better. You need to take the full course of antibiotics. · Drink extra water and other fluids for the next day or two. This may help wash out the bacteria that are causing the infection. (If you have kidney, heart, or liver disease and have to limit fluids, talk with your doctor before you increase your fluid intake.)  · Avoid drinks that are carbonated or have caffeine. They can irritate the bladder. · Urinate often. Try to empty your bladder each time. · To relieve pain, take a hot bath or lay a heating pad set on low over your lower belly or genital area. Never go to sleep with a heating pad in place. To prevent UTIs  · Drink plenty of water each day. This helps you urinate often, which clears bacteria from your system. (If you have kidney, heart, or liver disease and have to limit fluids, talk with your doctor before you increase your fluid intake.)  · Urinate when you need to. · Urinate right after you have sex. · Change sanitary pads often. · Avoid douches, bubble baths, feminine hygiene sprays, and other feminine hygiene products that have deodorants. · After going to the bathroom, wipe from front to back. When should you call for help? Call your doctor now or seek immediate medical care if:  ? · Symptoms such as fever, chills, nausea, or vomiting get worse or appear for the first time. ? · You have new pain in your back just below your rib cage.  This is called flank pain. ? · There is new blood or pus in your urine. ? · You have any problems with your antibiotic medicine. ? Watch closely for changes in your health, and be sure to contact your doctor if:  ? · You are not getting better after taking an antibiotic for 2 days. ? · Your symptoms go away but then come back. Where can you learn more? Go to http://sohail-geoffrey.info/. Enter B770 in the search box to learn more about \"Urinary Tract Infection in Women: Care Instructions. \"  Current as of: May 12, 2017  Content Version: 11.4  © 4719-1910 [employer]. Care instructions adapted under license by [employer] (which disclaims liability or warranty for this information). If you have questions about a medical condition or this instruction, always ask your healthcare professional. [address] any warranty or liability for your use of this information.

## 2018-03-15 NOTE — ED NOTES
Dr. Debby Escudero reviewed discharge instructions with the patient. The patient verbalized understanding. All questions and concerns were addressed. The patient declined a wheelchair and is discharged ambulatory in the care of family members with instructions and prescriptions in hand. Pt is alert and oriented x 4. Respirations are clear and unlabored.

## 2018-03-15 NOTE — ED NOTES
Pt presents with N/V x 2 weeks. Pt c/o irritation and burning sensation in throat. Chest pain described as gastric irritation after vomiting. Pt denies vag discharge, denies cramping, denies changes in urine output, denies changes in bowel movements. Pt negative for abdomen  Tenderness. No edema present. Lungs COA bilaterally. Pt on monitor x 3. Pts mother at bedside. Pt explains she had a prior miscarriage at 8 months 4 years ago. Pts mother states that pt is anemic and is not compliant with prenatals with iron. Pt resting in bed. Pt medicated per MAR. Specimens sent to lab. Call bell within reach.

## 2018-03-15 NOTE — ED PROVIDER NOTES
EMERGENCY DEPARTMENT HISTORY AND PHYSICAL EXAM           Date: 3/14/2018  Patient Name: Arvin Mcburney    History of Presenting Illness     Chief Complaint   Patient presents with    Vomiting     reports that she is currently 10 weeks pregnant and has been vomiting; has not seen OB yet, also reports \"burning\" in mid to upper Abd when she vomits       History Provided By: Patient    HPI: Arvin Mcburney is a 23 y.o. female, A1 ~10 weeks gravid, who presents ambulatory to the ED c/o gradually worsening diffuse epigastric abd pain with associated burning mid-sternal, non-radiating CP x 2 weeks. Pt reports additional nausea and vomiting (x10 today). She denies any hx of similar sxs. Pt denies any modifying factors. She denies any recent medications for her current sxs. Pt notes she is currently ~10 weeks gravid with a LNMP \"in the begging of 2017\". She denies any recent follow up with an OB. Pt reports her last pregnancy ended in fetal demise secondary to placental abruption at 7.5 months on 2017. She notes a hx of recurrent ovarian cysts with surgical excision. Pt states she has not yet followed up with her OB for an US or prenatal care. She specifically denies any recent vaginal discharge, vaginal bleeding, fevers, chills, diarrhea, SOB, urinary sxs, changes in BM, or headache. PCP: La Emerson MD   OB/GYN: [facility] Cardona - [place]    Allergies: NKDA  PMHx: Significant for ovarian cyst, placental abruption  PSHx: Significant for ovarian cyst excision, T&A  Social Hx: -tobacco, -EtOH, -Illicit Drugs    There are no other complaints, changes, or physical findings at this time.      Current Facility-Administered Medications   Medication Dose Route Frequency Provider Last Rate Last Dose    cefTRIAXone (ROCEPHIN) 1 gram injection             0.9% sodium chloride (MBP/ADV) infusion             sodium chloride 0.9 % bolus infusion 1,000 mL  1,000 mL IntraVENous NOW Jeffry Lakhani MD 1,000 mL/hr at 03/15/18 0241 1,000 mL at 03/15/18 0241     Current Outpatient Prescriptions   Medication Sig Dispense Refill    ondansetron (ZOFRAN ODT) 4 mg disintegrating tablet Take 1 Tab by mouth every eight (8) hours as needed for Nausea. 10 Tab 0    nitrofurantoin, macrocrystal-monohydrate, (MACROBID) 100 mg capsule Take 1 Cap by mouth two (2) times a day for 7 days. 14 Cap 0    PNV no.106-iron-folate no6-dha 27.5 mg iron- 1 mg cap Take 1 Tab by mouth daily.  ferrous sulfate 134 mg (27 mg iron) tab Take 1 Tab by mouth two (2) times a day. 61 Tab 5       Past History     Past Medical History:  Past Medical History:   Diagnosis Date    Abdominal pain, other specified site     Keratosis pilaris 10/1/2009    Otitis media        Past Surgical History:  Past Surgical History:   Procedure Laterality Date    HX ADENOIDECTOMY      HX GYN      HX HEENT      tubal/tonsils and adnoids    HX TONSILLECTOMY      HX TYMPANOSTOMY         Family History:  Family History   Problem Relation Age of Onset    Asthma Mother     Stroke Father     No Known Problems Sister     Asthma Brother     No Known Problems Brother     No Known Problems Sister        Social History:  Social History   Substance Use Topics    Smoking status: Never Smoker    Smokeless tobacco: Never Used    Alcohol use No       Allergies:  No Known Allergies      Review of Systems   Review of Systems   Constitutional: Negative. Negative for fever. Eyes: Negative. Respiratory: Negative. Negative for shortness of breath. Cardiovascular: Positive for chest pain. Gastrointestinal: Positive for abdominal pain (epigastric), nausea and vomiting. Endocrine: Negative. Genitourinary: Negative. Negative for difficulty urinating, dysuria, hematuria, vaginal bleeding, vaginal discharge and vaginal pain. Musculoskeletal: Negative. Skin: Negative. Allergic/Immunologic: Negative. Neurological: Negative.     Psychiatric/Behavioral: Negative for suicidal ideas. All other systems reviewed and are negative. Physical Exam   Physical Exam   Constitutional: She is oriented to person, place, and time. She appears well-developed and well-nourished. No distress. HENT:   Head: Normocephalic and atraumatic. Nose: Nose normal.   Eyes: Conjunctivae and EOM are normal. No scleral icterus. Neck: Normal range of motion. No tracheal deviation present. Cardiovascular: Normal rate, regular rhythm, normal heart sounds and intact distal pulses. Exam reveals no friction rub. No murmur heard. Pulmonary/Chest: Effort normal and breath sounds normal. No stridor. No respiratory distress. She has no wheezes. She has no rales. Abdominal: Soft. Bowel sounds are normal. She exhibits no distension. There is no tenderness. There is no rebound. Musculoskeletal: Normal range of motion. She exhibits no tenderness. Neurological: She is alert and oriented to person, place, and time. No cranial nerve deficit. Skin: Skin is warm and dry. No rash noted. She is not diaphoretic. Psychiatric: She has a normal mood and affect. Her speech is normal and behavior is normal. Judgment and thought content normal. Cognition and memory are normal.   Nursing note and vitals reviewed.         Diagnostic Study Results     Labs -     Recent Results (from the past 12 hour(s))   CBC WITH AUTOMATED DIFF    Collection Time: 03/14/18 10:59 PM   Result Value Ref Range    WBC 12.5 (H) 3.6 - 11.0 K/uL    RBC 5.16 3.80 - 5.20 M/uL    HGB 11.5 11.5 - 16.0 g/dL    HCT 37.0 35.0 - 47.0 %    MCV 71.7 (L) 80.0 - 99.0 FL    MCH 22.3 (L) 26.0 - 34.0 PG    MCHC 31.1 30.0 - 36.5 g/dL    RDW 17.2 (H) 11.5 - 14.5 %    PLATELET 836 (H) 954 - 400 K/uL    MPV 10.0 8.9 - 12.9 FL    NRBC 0.0 0  WBC    ABSOLUTE NRBC 0.00 0.00 - 0.01 K/uL    NEUTROPHILS 81 (H) 32 - 75 %    LYMPHOCYTES 13 12 - 49 %    MONOCYTES 4 (L) 5 - 13 %    EOSINOPHILS 2 0 - 7 %    BASOPHILS 0 0 - 1 %    IMMATURE GRANULOCYTES 0 0.0 - 0.5 %    ABS. NEUTROPHILS 10.2 (H) 1.8 - 8.0 K/UL    ABS. LYMPHOCYTES 1.6 0.8 - 3.5 K/UL    ABS. MONOCYTES 0.5 0.0 - 1.0 K/UL    ABS. EOSINOPHILS 0.2 0.0 - 0.4 K/UL    ABS. BASOPHILS 0.0 0.0 - 0.1 K/UL    ABS. IMM. GRANS. 0.1 (H) 0.00 - 0.04 K/UL    DF AUTOMATED     METABOLIC PANEL, COMPREHENSIVE    Collection Time: 03/14/18 10:59 PM   Result Value Ref Range    Sodium 135 (L) 136 - 145 mmol/L    Potassium 3.7 3.5 - 5.1 mmol/L    Chloride 101 97 - 108 mmol/L    CO2 24 21 - 32 mmol/L    Anion gap 10 5 - 15 mmol/L    Glucose 87 65 - 100 mg/dL    BUN 4 (L) 6 - 20 MG/DL    Creatinine 0.68 0.55 - 1.02 MG/DL    BUN/Creatinine ratio 6 (L) 12 - 20      GFR est AA >60 >60 ml/min/1.73m2    GFR est non-AA >60 >60 ml/min/1.73m2    Calcium 9.3 8.5 - 10.1 MG/DL    Bilirubin, total 0.4 0.2 - 1.0 MG/DL    ALT (SGPT) 18 12 - 78 U/L    AST (SGOT) 15 15 - 37 U/L    Alk. phosphatase 86 45 - 117 U/L    Protein, total 8.6 (H) 6.4 - 8.2 g/dL    Albumin 3.4 (L) 3.5 - 5.0 g/dL    Globulin 5.2 (H) 2.0 - 4.0 g/dL    A-G Ratio 0.7 (L) 1.1 - 2.2     URINALYSIS W/ REFLEX CULTURE    Collection Time: 03/14/18 10:59 PM   Result Value Ref Range    Color YELLOW/STRAW      Appearance CLOUDY (A) CLEAR      Specific gravity 1.022 1.003 - 1.030      pH (UA) 6.5 5.0 - 8.0      Protein NEGATIVE  NEG mg/dL    Glucose NEGATIVE  NEG mg/dL    Ketone 15 (A) NEG mg/dL    Bilirubin NEGATIVE  NEG      Blood NEGATIVE  NEG      Urobilinogen 1.0 0.2 - 1.0 EU/dL    Nitrites NEGATIVE  NEG      Leukocyte Esterase MODERATE (A) NEG      WBC 10-20 0 - 4 /hpf    RBC 0-5 0 - 5 /hpf    Epithelial cells FEW FEW /lpf    Bacteria 2+ (A) NEG /hpf    UA:UC IF INDICATED URINE CULTURE ORDERED (A) CNI     LIPASE    Collection Time: 03/14/18 10:59 PM   Result Value Ref Range    Lipase 90 73 - 393 U/L   TOTAL HCG, QT.     Collection Time: 03/14/18 10:59 PM   Result Value Ref Range    Beta HCG, QT 390188 (H) 0 - 6 MIU/ML   WET PREP    Collection Time: 03/15/18  1:14 AM   Result Value Ref Range    Clue cells CLUE CELLS ABSENT      Wet prep NO TRICHOMONAS SEEN     KOH, OTHER SOURCES    Collection Time: 03/15/18  1:14 AM   Result Value Ref Range    Special Requests: NO SPECIAL REQUESTS      KOH NO YEAST SEEN         Radiologic Studies -   US PREG UTS < 14 WKS SNGL   Final Result     History: Pregnant, abdominal pain.     Transabdominal ultrasound of the pelvis was performed. The uterus measures 11 x  5.4 x 7.8 cm. There is a single intrauterine gestation. The gestational sac  measures 4.3 cm corresponding to a mean menstrual age of 9 weeks 5 days. The  fetal pole measures 3.8 cm corresponding to a mean menstrual age of 10 weeks 5  days. Heart rate measures 165 bpm. The placenta is posterior and free of the  cervical os. The ovaries are obscured by bowel gas.        IMPRESSION  IMPRESSION: Single viable intrauterine gestation with a mean menstrual age of 10  weeks 5 days +/- 1 week. Medical Decision Making   I am the first provider for this patient. I reviewed the vital signs, available nursing notes, past medical history, past surgical history, family history and social history. Vital Signs-Reviewed the patient's vital signs.   Patient Vitals for the past 12 hrs:   Temp Pulse Resp BP SpO2   03/15/18 0245 98.6 °F (37 °C) 88 18 104/59 100 %   03/15/18 0233 - 86 19 105/51 100 %   03/15/18 0200 - 88 20 101/42 100 %   03/15/18 0140 - 82 19 94/52 99 %   03/15/18 0117 - 82 22 101/47 100 %   03/14/18 2250 - - - - 100 %   03/14/18 2109 98.3 °F (36.8 °C) 96 16 115/72 100 %       Pulse Oximetry Analysis - 100% on RA    Cardiac Monitor:   Rate: 97 bpm  Rhythm: Normal Sinus Rhythm     Records Reviewed: Nursing Notes and Old Medical Records    Provider Notes (Medical Decision Making):     DDX:  Gerd/gastritis, abd pain in pregnancy, morning sickness, hyperemesis, uti    Plan:  Labs, gi cocktail, labs, us, pelvic with labs    Impression:  Uti, n/v in pregnanc    ED Course:   Initial assessment performed. The patients presenting problems have been discussed, and they are in agreement with the care plan formulated and outlined with them. I have encouraged them to ask questions as they arise throughout their visit. I reviewed our electronic medical record system for any past medical records that were available that may contribute to the patients current condition, the nursing notes and and vital signs from today's visit    Nursing notes will be reviewed as they become available in realtime while the pt has been in the ED. Galina Hammond MD      I personally reviewed pt's imaging. Official read by radiology listed below. Galina Hammond MD    Procedure Note - Pelvic Exam:    1:12 AM  Performed by: Galina Hammond MD  Chaperoned by: Benjamin Eubanks RN  Pelvic exam was performed using bimanual and speculum. Further findings noted in physical exam.   The procedure took 1-15 minutes, and pt tolerated well.    2:46 AM  Progress note:  Pt noted to be feeling better, sxs resolved throughout ED stay, ready for discharge. Discussed lab and imaging findings with pt and/or family, specifically noting single viable IUP ~10 weeks 5 days. Pt will follow up as instructed. All questions have been answered, pt voiced understanding and agreement with plan. If narcotics were prescribed, pt was advised not to drive or operate heavy machinery. If abx were prescribed, pt advised that diarrhea and rash are possible side effects of the medications. Specific return precautions provided in addition to instructions for pt to return to the ED immediately should sx worsen at any time. Galian Hammond MD    Diagnosis     Clinical Impression:   1. Non-intractable vomiting with nausea, unspecified vomiting type    2. Acute cystitis without hematuria    3. 10 weeks gestation of pregnancy        PLAN:  1.    Current Discharge Medication List      START taking these medications    Details   ondansetron (ZOFRAN ODT) 4 mg disintegrating tablet Take 1 Tab by mouth every eight (8) hours as needed for Nausea. Qty: 10 Tab, Refills: 0      nitrofurantoin, macrocrystal-monohydrate, (MACROBID) 100 mg capsule Take 1 Cap by mouth two (2) times a day for 7 days. Qty: 14 Cap, Refills: 0           2. Follow-up Information     Follow up With Details Comments Contact Info    Daniel Garcia MD Schedule an appointment as soon as possible for a visit in 2 days  [address] [facility]  [address] [facility]  [address] [facility] Schedule an appointment as soon as possible for a visit  [facility] [address] [facility] Schedule an appointment as soon as possible for a visit in 2 days  [address]  [phone]        Return to ED if worse     Disposition:    DISCHARGE NOTE:  2:53 AM  The patient's results have been reviewed with family and/or caregiver. They verbally convey their understanding and agreement of the patient's signs, symptoms, diagnosis, treatment, and prognosis. They additionally agree to follow up as recommended in the discharge instructions or to return to the Emergency Room should the patient's condition change prior to their follow-up appointment. The family and/or caregiver verbally agrees with the care-plan and all of their questions have been answered. The discharge instructions have also been provided to the them along with educational information regarding the patient's diagnosis and a list of reasons why the patient would want to return to the ER prior to their follow-up appointment should their condition change. Written by Vero Rene, ED Scribe, as dictated by Elroy Hammans, MD.             Attestations:     This note is prepared by Vero Rene, acting as Scribe for Elroy Hammans, MD Elroy Hammans, MD : The scribe's documentation has been prepared under my direction and personally reviewed by me in its entirety. I confirm that the note above accurately reflects all work, treatment, procedures, and medical decision making performed by me. This note will not be viewable in [address]

## 2018-03-17 LAB
BACTERIA SPEC CULT: ABNORMAL
CC UR VC: ABNORMAL
SERVICE CMNT-IMP: ABNORMAL

## 2018-09-12 ENCOUNTER — HOSPITAL ENCOUNTER (EMERGENCY)
Age: 23
Discharge: HOME | End: 2018-09-12
Attending: OBSTETRICS & GYNECOLOGY | Admitting: OBSTETRICS & GYNECOLOGY
Payer: MEDICAID

## 2018-09-12 VITALS
OXYGEN SATURATION: 98 % | DIASTOLIC BLOOD PRESSURE: 79 MMHG | HEIGHT: 63 IN | WEIGHT: 248 LBS | TEMPERATURE: 98.7 F | RESPIRATION RATE: 16 BRPM | HEART RATE: 91 BPM | BODY MASS INDEX: 43.94 KG/M2 | SYSTOLIC BLOOD PRESSURE: 123 MMHG

## 2018-09-12 DIAGNOSIS — N89.8 VAGINAL DISCHARGE DURING PREGNANCY IN THIRD TRIMESTER: Primary | ICD-10-CM

## 2018-09-12 DIAGNOSIS — O26.893 VAGINAL DISCHARGE DURING PREGNANCY IN THIRD TRIMESTER: Primary | ICD-10-CM

## 2018-09-12 LAB
A1 MICROGLOB PLACENTAL VAG QL: NEGATIVE
CONTROL LINE PRESENT?: NORMAL
EXPIRATION DATE: NORMAL
INTERNAL NEGATIVE CONTROL: NORMAL
KIT LOT NO.: NORMAL

## 2018-09-12 PROCEDURE — 84112 EVAL AMNIOTIC FLUID PROTEIN: CPT | Performed by: OBSTETRICS & GYNECOLOGY

## 2018-09-12 PROCEDURE — 99285 EMERGENCY DEPT VISIT HI MDM: CPT

## 2018-09-12 PROCEDURE — 75810000275 HC EMERGENCY DEPT VISIT NO LEVEL OF CARE

## 2018-09-12 PROCEDURE — 59025 FETAL NON-STRESS TEST: CPT

## 2018-09-12 NOTE — IP AVS SNAPSHOT
[address] [facility] 
[phone] Patient: eTresa Morales MRN: [id number] EIJ:[birth year] A check jean indicates which time of day the medication should be taken. My Medications ASK your doctor about these medications Instructions Each Dose to Equal  
 Morning Noon Evening Bedtime  
 ferrous sulfate 134 mg (27 mg iron) Tab Your last dose was: Your next dose is: Take 1 Tab by mouth two (2) times a day. 1 Tab  
    
   
   
   
  
 ondansetron 4 mg disintegrating tablet Commonly known as:  ZOFRAN ODT Your last dose was: Your next dose is: Take 1 Tab by mouth every eight (8) hours as needed for Nausea. 4 mg PNV no.106-iron-folate no6-dha 27.5 mg iron- 1 mg Cap Your last dose was: Your next dose is: Take 1 Tab by mouth daily. 1 Tab

## 2018-09-12 NOTE — H&P
OB Outpatient Visit Name: Suki Rodriguez MRN: [id number]  SSN: [id number] YOB: 1995  Age: 26 y.o. Sex: female Subjective:  
 
Reason for Admission:  Pregnancy and ? SROM History of Present Illness: Suki Rodriguez is a 26year old   female with an estimated gestational age of 36w7d with Estimated Date of Delivery: 10/4/18. Patient complains of leaking clear fluid this morning while walking upstairs, no further significant leaking. Denies bleeding, contractions. Baby active. Has been obtaining prenatal care at the [address] [place] [address]. Denies significant problems with this pregnancy. Previous OB:  at 30 weeks;  demise OB History  Para Term  AB Living 2 1  1 SAB TAB Ectopic Molar Multiple Live Births  
      0 Past Medical History:  
Diagnosis Date  Abdominal pain, other specified site  Keratosis pilaris 10/1/2009  Otitis media Past Surgical History:  
Procedure Laterality Date  HX ADENOIDECTOMY  HX GYN    
 HX HEENT    
 tubal/tonsils and adnoids  HX OTHER SURGICAL    
 tonsilectomy childhood  HX OTHER SURGICAL    
 left ovary cyst removal   
 HX TONSILLECTOMY  HX TYMPANOSTOMY Social History Occupational History  Not on file. Social History Main Topics  Smoking status: Never Smoker  Smokeless tobacco: Never Used  Alcohol use No  
 Drug use: No  
 Sexual activity: No  
 
Family History Problem Relation Age of Onset  Asthma Mother  Stroke Father  No Known Problems Sister  Asthma Brother  No Known Problems Brother  No Known Problems Sister No Known Allergies Prior to Admission medications Medication Sig Start Date End Date Taking? Authorizing Provider PNV no.106-iron-folate no6-dha 27.5 mg iron- 1 mg cap Take 1 Tab by mouth daily.    Yes Phys Other, MD  
ferrous sulfate 134 mg (27 mg iron) tab Take 1 Tab by mouth two (2) times a day. 5/3/17  Yes Su Britton MD  
ondansetron (ZOFRAN ODT) 4 mg disintegrating tablet Take 1 Tab by mouth every eight (8) hours as needed for Nausea. 3/15/18   Ashli Steven MD  
  
 
Review of Systems: 
General: Denies fever, sweats, chills CV: Denies CP, palpitations Resp: Denies SOB, cough GI: Denies nausea, vomiting, diarrhea : Denies dysura, abnormal frequency, hematuria Neuro: Denies HA, visual disturbance Objective:  
 
Vitals:   
Vitals:  
 18 1124 18 1205 18 1207 BP: 155/87 123/79 Pulse: 89 91 Resp: 16 16 Temp: 98.3 °F (36.8 °C) 98.7 °F (37.1 °C) SpO2: 95% 98% Weight: 112.3 kg (247 lb 9.2 oz)  112.5 kg (248 lb) Height: 5' 3\" (1.6 m)  5' 3\" (1.6 m) Temp (24hrs), Av.5 °F (36.9 °C), Min:98.3 °F (36.8 °C), Max:98.7 °F (37.1 °C) BP  Min: 123/79  Max: 155/87 Physical Exam 
General: in NAD Back: no CVAT Abdomen: Gravid, soft, nontender, no abnormal masses, rebound, rigidity, guarding Fundus: soft, nontender Cervical Exam: FT/60/-2 Uterine Activity: no contractions detected Membranes: speculum exam: no pooling Fetal Heart Rate: adequate variability and reactivity Labs: Amnisure negative Patient Active Problem List  
Diagnosis Code  Abdominal pain, unspecified site R10.9  Keratosis Pilaris L85.8  Ovarian cyst, left N83.202  Torsion of ovary, ovarian pedicle and fallopian tube N83.53 Assessment and Plan: TIUP, no evidence ROM Discharge home Follow-up with Clinic as scheduled Precautions reviewed; questions answered Signed By:  Mike Montgomery MD   
 2018

## 2018-09-12 NOTE — IP AVS SNAPSHOT
Summary of Care Report The Summary of Care report has been created to help improve care coordination. Users with access to [employer] or [address] (Web-based application) may access additional patient information including the Discharge Summary. If you are not currently a [address] user and need more information, please call the number listed below in the [place] [address] section and ask to be connected with Medical Records. Facility Information Name Address Phone [address] [address] [phone] Patient Information Patient Name Sex RUFUS Mills ([id number]) Female 1995 Discharge Information Admitting Provider Service Area Unit Troy Rick MD / [address] [place] [address] 3 [facility] / [phone] Discharge Provider Discharge Date/Time Discharge Disposition Destination (none) (none) (none) (none) Patient Language Language ENGLISH [13] Hospital Problems as of 2018  Reviewed: 2017  2:09 PM by Sarai Metcalf MD  
 None Non-Hospital Problems as of 2018  Reviewed: 2017  2:09 PM by Sarai Metcalf MD  
  
  
  
 Class Noted - Resolved Last Modified Active Problems Abdominal pain, unspecified site  10/1/2009 - Present 10/1/2009 by Jasmyne Sommers Entered by Jasmyne Sommers Overview Signed 10/1/2009  4:08 PM by Gloria Chapman  
   recurrent Keratosis Pilaris  10/1/2009 - Present 10/1/2009 by Jasmyne Sommers Entered by Jasmyne Sommers Ovarian cyst, left Present on Admission 4/3/2017 - Present 2017 by Lary Duane Entered by Lary Duane Torsion of ovary, ovarian pedicle and fallopian tube Present on Admission 2017 - Present 2017 by Lary Duane Entered by Lary Duane You are allergic to the following No active allergies Current Discharge Medication List  
  
ASK your doctor about these medications Dose & Instructions Dispensing Information Comments  
 ferrous sulfate 134 mg (27 mg iron) Tab Dose:  1 Tab Take 1 Tab by mouth two (2) times a day. Quantity:  60 Tab Refills:  5  
   
 ondansetron 4 mg disintegrating tablet Commonly known as:  ZOFRAN ODT Dose:  4 mg Take 1 Tab by mouth every eight (8) hours as needed for Nausea. Quantity:  10 Tab Refills:  0  
   
 PNV no.106-iron-folate no6-dha 27.5 mg iron- 1 mg Cap Dose:  1 Tab Take 1 Tab by mouth daily. Refills:  0 Current Immunizations Name Date DTAP Vaccine 8/28/2001, 8/19/1998, 1997, 1997, 1997 HIB Vaccine 8/19/1998, 1997, 1997, 1997 Hepatitis B Vaccine 1997, 1997, 1997 IPV 8/28/2001, 1997, 1997, 1997 Influenza Vaccine 10/31/2014 Influenza Vaccine Split 10/20/2012 MMR Vaccine 8/28/2001, 8/18/1998 TDAP Vaccine 8/6/2008 Follow-up Information None Discharge Instructions Keep next appointment with the health clinic. Stay well hydrated with water. Return to the hospital with any concers - bleeding, continuing to leak, decreased fetal movement. Week 37 of Your Pregnancy: Care Instructions Your Care Instructions You are near the end of your pregnancy-and you're probably pretty uncomfortable. It may be harder to walk around. Lying down probably isn't comfortable either. You may have trouble getting to sleep or staying asleep. Most women deliver their babies between 40 and 41 weeks. This is a good time to think about packing a bag for the hospital with items you'll need. Then you'll be ready when labor starts. Follow-up care is a key part of your treatment and safety.  Be sure to make and go to all appointments, and call your doctor if you are having problems. It's also a good idea to know your test results and keep a list of the medicines you take. How can you care for yourself at home? Learn about breastfeeding · Breastfeeding is best for your baby and good for you. · Breast milk has antibodies to help your baby fight infections. · Mothers who breastfeed often lose weight faster, because making milk burns calories. · Learning the best ways to hold your baby will make breastfeeding easier. · Let your partner bathe and diaper the baby to keep your partner from feeling left out. Snuggle together when you breastfeed. · You may want to learn how to use a breast pump and store your milk. · If you choose to bottle feed, make the feeding feel like breastfeeding so you can bond with your baby. Always hold your baby and the bottle. Do not prop bottles or let your baby fall asleep with a bottle. Learn about crying · It is common for babies to cry for 1 to 3 hours a day. Some cry more, some cry less. · Babies don't cry to make you upset or because you are a bad parent. · Crying is how your baby communicates. Your baby may be hungry; have gas; need a diaper change; or feel cold, warm, tired, lonely, or tense. Sometimes babies cry for unknown reasons. · If you respond to your baby's needs, he or she will learn to trust you. · Try to stay calm when your baby cries. Your baby may get more upset if he or she senses that you are upset. Know how to care for your  · Your baby's umbilical cord stump will drop off on its own, usually between 1 and 2 weeks. To care for your baby's umbilical cord area: ¨ Clean the area at the bottom of the cord 2 or 3 times a day. ¨ Pay special attention to the area where the cord attaches to the skin. ¨ Keep the diaper folded below the cord. ¨ Use a damp washcloth or cotton ball to sponge bathe your baby until the stump has come off. · Your baby's first dark stool is called meconium.  After the meconium is passed, your baby will develop his or her own bowel pattern. ¨ Some babies, especially  babies, have several bowel movements a day. Others have one or two a day, or one every 2 to 3 days. ¨  babies often have loose, yellow stools. Formula-fed babies have more formed stools. ¨ If your baby's stools look like little pellets, he or she is constipated. After 2 days of constipation, call your baby's doctor. · If your baby will be circumcised, you can care for him at home. ¨ Gently rinse his penis with warm water after every diaper change. Do not try to remove the film that forms on the penis. This film will go away on its own. Pat dry. ¨ Put petroleum ointment, such as Vaseline, on the area of the diaper that will touch your baby's penis. This will keep the diaper from sticking to your baby. ¨ Ask the doctor about giving your baby acetaminophen (Tylenol) for pain. Where can you learn more? Go to http://sohail-geoffrey.info/. Enter 68 21 [id number] in the search box to learn more about \"Week 37 of Your Pregnancy: Care Instructions. \" Current as of: November 21, 2017 Content Version: 11.7 © 4199-5928 Healthwise[employer]orporated. Care instructions adapted under license by [employer] (which disclaims liability or warranty for this information). If you have questions about a medical condition or this instruction, always ask your healthcare professional. [place] [address] any warranty or liability for your use of this information. Chart Review Routing History No Routing History on File

## 2018-09-12 NOTE — ED PROVIDER NOTES
EMERGENCY DEPARTMENT HISTORY AND PHYSICAL EXAM 
 
 
Date: 2018 Patient Name: Clarita Pascual History of Presenting Illness Chief Complaint Patient presents with  Pregnancy Problem  
  pt reports she is 37 weeks pregnant and felt fluid leaking from vagina today, called OB/GYN clinic and told to come to ED History Provided By: Patient HPI: Clarita Pascual, 26 y.o. A2 female, 37 weeks pregnant, presents ambulatory to the ED after passing clear fluid from her vagina at home PTA today. Pt reports onset while walking down the stairs of her home, noting she noticed fluid running down the inside of her leg. She also reports 2/10 cramping ABD pain and back pain since onset. Pt otherwise without complaint. She states she was referred to ED after consulting her OB/Gyn's office today. Pt specifically denies fever, chills, nausea, vomiting, or vaginal bleeding. There are no other complaints, changes, or physical findings at this time. PCP: Eugenie Franco MD 
 
Current Outpatient Prescriptions Medication Sig Dispense Refill  ondansetron (ZOFRAN ODT) 4 mg disintegrating tablet Take 1 Tab by mouth every eight (8) hours as needed for Nausea. 10 Tab 0  PNV no.106-iron-folate no6-dha 27.5 mg iron- 1 mg cap Take 1 Tab by mouth daily.  ferrous sulfate 134 mg (27 mg iron) tab Take 1 Tab by mouth two (2) times a day. 60 Tab 5 Past History Past Medical History: 
Past Medical History:  
Diagnosis Date  Abdominal pain, other specified site  Keratosis pilaris 10/1/2009  Otitis media Past Surgical History: 
Past Surgical History:  
Procedure Laterality Date  HX ADENOIDECTOMY  HX GYN    
 HX HEENT    
 tubal/tonsils and adnoids  HX TONSILLECTOMY  HX TYMPANOSTOMY Family History: 
Family History Problem Relation Age of Onset  Asthma Mother  Stroke Father  No Known Problems Sister  Asthma Brother  No Known Problems Brother  No Known Problems Sister Social History: 
Social History Substance Use Topics  Smoking status: Never Smoker  Smokeless tobacco: Never Used  Alcohol use No  
 
 
Allergies: 
No Known Allergies Review of Systems Review of Systems Constitutional: Negative for chills and fever. HENT: Negative for congestion. Eyes: Negative for visual disturbance. Respiratory: Negative for chest tightness. Cardiovascular: Negative for chest pain. Gastrointestinal: Positive for abdominal pain (cramping). Negative for nausea and vomiting. Endocrine: Negative for heat intolerance. Genitourinary: Negative for vaginal bleeding.  
     +clear vaginal fluid Musculoskeletal: Positive for back pain (cramping). Skin: Negative for rash. Allergic/Immunologic: Negative for immunocompromised state. Neurological: Negative for dizziness. Hematological: Does not bruise/bleed easily. Psychiatric/Behavioral: Negative. All other systems reviewed and are negative. Physical Exam  
Physical Exam  
Constitutional: She is oriented to person, place, and time. She appears well-developed and well-nourished. No distress. Elevated BMI HENT:  
Head: Normocephalic and atraumatic. Eyes: EOM are normal. Pupils are equal, round, and reactive to light. Neck: Normal range of motion. Neck supple. Cardiovascular: Normal rate, regular rhythm and normal heart sounds. Pulmonary/Chest: Effort normal and breath sounds normal. No respiratory distress. Abdominal: Soft. Bowel sounds are normal. She exhibits no mass. There is no tenderness. Gravid Musculoskeletal: Normal range of motion. She exhibits no edema. Neurological: She is alert and oriented to person, place, and time. Coordination normal.  
Skin: Skin is warm and dry. Psychiatric: She has a normal mood and affect. Her behavior is normal.  
Nursing note and vitals reviewed. Medical Decision Making I am the first provider for this patient. I reviewed the vital signs, available nursing notes, past medical history, past surgical history, family history and social history. Vital Signs-Reviewed the patient's vital signs. Patient Vitals for the past 12 hrs: 
 Temp Pulse Resp BP SpO2  
09/12/18 1124 98.3 °F (36.8 °C) 89 16 155/87 95 % Pulse Oximetry Analysis - 95% on RA Records Reviewed: Nursing Notes and Old Medical Records Provider Notes (Medical Decision Making): DDx: ruptured membranes, contractions, labor ED Course:  
Initial assessment performed. The patients presenting problems have been discussed, and they are in agreement with the care plan formulated and outlined with them. I have encouraged them to ask questions as they arise throughout their visit. Disposition: 
DISCHARGE NOTE: 
11:30 AM 
The patient is ready for discharge. The patients signs, symptoms, diagnosis, and instructions for discharge have been discussed and the pt has conveyed their understanding. The patient is to follow up as recommended or return to the ER should their symptoms worsen. Plan has been discussed and patient has conveyed their agreement. PLAN: 
1. Discharge to L&D Diagnosis Clinical Impression: 1. Vaginal discharge during pregnancy in third trimester Attestations: This note is prepared by Josy Dowell, acting as Scribe for Cuco Flores MD. 
 
Cuco Flores MD: The scribe's documentation has been prepared under my direction and personally reviewed by me in its entirety. I confirm that the note above accurately reflects all work, treatment, procedures, and medical decision making performed by me.

## 2018-09-12 NOTE — ED TRIAGE NOTES
Dr. Manfred Polanco in triage to evaluate pt, Elizabeth Arciniega in L&D called to inform pt will be brought upstairs, pt taken by wheelchair to L&D

## 2018-09-12 NOTE — DISCHARGE INSTRUCTIONS
Keep next appointment with the health clinic. Stay well hydrated with water. Return to the hospital with any concers - bleeding, continuing to leak, decreased fetal movement. Week 37 of Your Pregnancy: Care Instructions  Your Care Instructions    You are near the end of your pregnancy-and you're probably pretty uncomfortable. It may be harder to walk around. Lying down probably isn't comfortable either. You may have trouble getting to sleep or staying asleep. Most women deliver their babies between 40 and 41 weeks. This is a good time to think about packing a bag for the hospital with items you'll need. Then you'll be ready when labor starts. Follow-up care is a key part of your treatment and safety. Be sure to make and go to all appointments, and call your doctor if you are having problems. It's also a good idea to know your test results and keep a list of the medicines you take. How can you care for yourself at home? Learn about breastfeeding  · Breastfeeding is best for your baby and good for you. · Breast milk has antibodies to help your baby fight infections. · Mothers who breastfeed often lose weight faster, because making milk burns calories. · Learning the best ways to hold your baby will make breastfeeding easier. · Let your partner bathe and diaper the baby to keep your partner from feeling left out. Snuggle together when you breastfeed. · You may want to learn how to use a breast pump and store your milk. · If you choose to bottle feed, make the feeding feel like breastfeeding so you can bond with your baby. Always hold your baby and the bottle. Do not prop bottles or let your baby fall asleep with a bottle. Learn about crying  · It is common for babies to cry for 1 to 3 hours a day. Some cry more, some cry less. · Babies don't cry to make you upset or because you are a bad parent. · Crying is how your baby communicates.  Your baby may be hungry; have gas; need a diaper change; or feel cold, warm, tired, lonely, or tense. Sometimes babies cry for unknown reasons. · If you respond to your baby's needs, he or she will learn to trust you. · Try to stay calm when your baby cries. Your baby may get more upset if he or she senses that you are upset. Know how to care for your   · Your baby's umbilical cord stump will drop off on its own, usually between 1 and 2 weeks. To care for your baby's umbilical cord area:  ¨ Clean the area at the bottom of the cord 2 or 3 times a day. ¨ Pay special attention to the area where the cord attaches to the skin. ¨ Keep the diaper folded below the cord. ¨ Use a damp washcloth or cotton ball to sponge bathe your baby until the stump has come off. · Your baby's first dark stool is called meconium. After the meconium is passed, your baby will develop his or her own bowel pattern. ¨ Some babies, especially  babies, have several bowel movements a day. Others have one or two a day, or one every 2 to 3 days. ¨  babies often have loose, yellow stools. Formula-fed babies have more formed stools. ¨ If your baby's stools look like little pellets, he or she is constipated. After 2 days of constipation, call your baby's doctor. · If your baby will be circumcised, you can care for him at home. ¨ Gently rinse his penis with warm water after every diaper change. Do not try to remove the film that forms on the penis. This film will go away on its own. Pat dry. ¨ Put petroleum ointment, such as Vaseline, on the area of the diaper that will touch your baby's penis. This will keep the diaper from sticking to your baby. ¨ Ask the doctor about giving your baby acetaminophen (Tylenol) for pain. Where can you learn more? Go to http://sohail-geoffrey.info/. Enter  97 in the search box to learn more about \"Week 37 of Your Pregnancy: Care Instructions. \"  Current as of: 2017  Content Version: 11.7  © 9358-2140 Healthwise Incorporated. Care instructions adapted under license by [employer] (which disclaims liability or warranty for this information). If you have questions about a medical condition or this instruction, always ask your healthcare professional. [address] any warranty or liability for your use of this information.

## 2018-09-12 NOTE — IP AVS SNAPSHOT
[address] [facility] 
[phone] Patient: Susana Houser MRN: [id number] SHRUTHI:[birth year] About your hospitalization You were admitted on:  N/A You last received care in the:  [facility] 3 LD TRIAGE You were discharged on:  September 12, 2018 Why you were hospitalized Your primary diagnosis was:  Not on File Follow-up Information None Discharge Orders None A check jean indicates which time of day the medication should be taken. My Medications ASK your doctor about these medications Instructions Each Dose to Equal  
 Morning Noon Evening Bedtime  
 ferrous sulfate 134 mg (27 mg iron) Tab Your last dose was: Your next dose is: Take 1 Tab by mouth two (2) times a day. 1 Tab  
    
   
   
   
  
 ondansetron 4 mg disintegrating tablet Commonly known as:  ZOFRAN ODT Your last dose was: Your next dose is: Take 1 Tab by mouth every eight (8) hours as needed for Nausea. 4 mg PNV no.106-iron-folate no6-dha 27.5 mg iron- 1 mg Cap Your last dose was: Your next dose is: Take 1 Tab by mouth daily. 1 Tab Discharge Instructions Keep next appointment with the health clinic. Stay well hydrated with water. Return to the hospital with any concers - bleeding, continuing to leak, decreased fetal movement. Week 37 of Your Pregnancy: Care Instructions Your Care Instructions You are near the end of your pregnancy-and you're probably pretty uncomfortable. It may be harder to walk around. Lying down probably isn't comfortable either. You may have trouble getting to sleep or staying asleep. Most women deliver their babies between 40 and 41 weeks. This is a good time to think about packing a bag for the hospital with items you'll need. Then you'll be ready when labor starts. Follow-up care is a key part of your treatment and safety. Be sure to make and go to all appointments, and call your doctor if you are having problems. It's also a good idea to know your test results and keep a list of the medicines you take. How can you care for yourself at home? Learn about breastfeeding · Breastfeeding is best for your baby and good for you. · Breast milk has antibodies to help your baby fight infections. · Mothers who breastfeed often lose weight faster, because making milk burns calories. · Learning the best ways to hold your baby will make breastfeeding easier. · Let your partner bathe and diaper the baby to keep your partner from feeling left out. Snuggle together when you breastfeed. · You may want to learn how to use a breast pump and store your milk. · If you choose to bottle feed, make the feeding feel like breastfeeding so you can bond with your baby. Always hold your baby and the bottle. Do not prop bottles or let your baby fall asleep with a bottle. Learn about crying · It is common for babies to cry for 1 to 3 hours a day. Some cry more, some cry less. · Babies don't cry to make you upset or because you are a bad parent. · Crying is how your baby communicates. Your baby may be hungry; have gas; need a diaper change; or feel cold, warm, tired, lonely, or tense. Sometimes babies cry for unknown reasons. · If you respond to your baby's needs, he or she will learn to trust you. · Try to stay calm when your baby cries. Your baby may get more upset if he or she senses that you are upset. Know how to care for your  · Your baby's umbilical cord stump will drop off on its own, usually between 1 and 2 weeks. To care for your baby's umbilical cord area: ¨ Clean the area at the bottom of the cord 2 or 3 times a day. ¨ Pay special attention to the area where the cord attaches to the skin. ¨ Keep the diaper folded below the cord. ¨ Use a damp washcloth or cotton ball to sponge bathe your baby until the stump has come off. · Your baby's first dark stool is called meconium. After the meconium is passed, your baby will develop his or her own bowel pattern. ¨ Some babies, especially  babies, have several bowel movements a day. Others have one or two a day, or one every 2 to 3 days. ¨  babies often have loose, yellow stools. Formula-fed babies have more formed stools. ¨ If your baby's stools look like little pellets, he or she is constipated. After 2 days of constipation, call your baby's doctor. · If your baby will be circumcised, you can care for him at home. ¨ Gently rinse his penis with warm water after every diaper change. Do not try to remove the film that forms on the penis. This film will go away on its own. Pat dry. ¨ Put petroleum ointment, such as Vaseline, on the area of the diaper that will touch your baby's penis. This will keep the diaper from sticking to your baby. ¨ Ask the doctor about giving your baby acetaminophen (Tylenol) for pain. Where can you learn more? Go to http://sohail-geoffrey.info/. Enter 68 21 [id number] in the search box to learn more about \"Week 37 of Your Pregnancy: Care Instructions. \" Current as of: November 21, 2017 Content Version: 11.7 © 5928-7484 [employer]. Care instructions adapted under license by [employer] (which disclaims liability or warranty for this information). If you have questions about a medical condition or this instruction, always ask your healthcare professional. [place] [address] any warranty or liability for your use of this information. Introducing [facility]! [facility] introduces [employer] patient portal. Now you can access parts of your medical record, email your doctor's office, and request medication refills online.    
 
1. In your internet browser, go to [email or web address] 2. Click on the First Time User? Click Here link in the Sign In box. You will see the New Member Sign Up page. 3. Enter your Vayable Access Code exactly as it appears below. You will not need to use this code after youve completed the sign-up process. If you do not sign up before the expiration date, you must request a new code. · Vayable Access Code: [email or web address] Expires: 12/11/2018 11:22 AM 
 
4. Enter the last four digits of your Social Security Number (xxxx) and Date of Birth (mm/dd/yyyy) as indicated and click Submit. You will be taken to the next sign-up page. 5. Create a Zeensharet ID. This will be your Vayable login ID and cannot be changed, so think of one that is secure and easy to remember. 6. Create a Vayable password. You can change your password at any time. 7. Enter your Password Reset Question and Answer. This can be used at a later time if you forget your password. 8. Enter your e-mail address. You will receive e-mail notification when new information is available in [address] 9. Click Sign Up. You can now view and download portions of your medical record. 10. Click the Download Summary menu link to download a portable copy of your medical information. If you have questions, please visit the Frequently Asked Questions section of the Vayable website. Remember, Vayable is NOT to be used for urgent needs. For medical emergencies, dial 911. Now available from your iPhone and Android! Introducing Tevin Shannon As a [facility] Single patient, I wanted to make you aware of our electronic visit tool called Tevin Shannon. Tahir Antonio 24/7 allows you to connect within minutes with a medical provider 24 hours a day, seven days a week via a mobile device or tablet or logging into a secure website from your computer. You can access Tevin Shannon from anywhere in the [place]. A virtual visit might be right for you when you have a simple condition and feel like you just dont want to get out of bed, or cant get away from work for an appointment, when your regular [employer] provider is not available (evenings, weekends or holidays), or when youre out of town and need minor care. Electronic visits cost only $49 and if the [employer] 24/7 provider determines a prescription is needed to treat your condition, one can be electronically transmitted to a nearby pharmacy*. Please take a moment to enroll today if you have not already done so. The enrollment process is free and takes just a few minutes. To enroll, please download the Cherl Grain 24/7 wil to your tablet or phone, or visit [email or web address] to enroll on your computer. And, as an [address] [place] [address] patient with a [employer] account, the results of your visits will be scanned into your electronic medical record and your primary care provider will be able to view the scanned results. We urge you to continue to see your regular [facility] provider for your ongoing medical care. And while your primary care provider may not be the one available when you seek a Tevin Cl[facility]Castk[facility] virtual visit, the peace of mind you get from getting a real diagnosis real time can be priceless. For more information on Tevin CliniCastkaushik, view our Frequently Asked Questions (FAQs) at [email or web address]. Sincerely, 
 
Kat Garza MD 
Chief Medical Officer Jonatan Swan *:  certain medications cannot be prescribed via Tevin Clini[facility] Providers Seen During Your Hospitalization Provider Specialty Primary office phone Lu Paulino MD Obstetrics & Gynecology [phone] Your Primary Care Physician (PCP) Primary Care Physician Office Phone Office Fax [address] [phone] You are allergic to the following No active allergies Recent Documentation Height Weight BMI OB Status Smoking Status 1.6 m 112.5 kg 43.93 kg/m2 Pregnant Never Smoker Emergency Contacts Name Discharge Info Relation Home Work Mobile [address] CAREGIVER [3] Father [15] [phone] JustinoRichelle DISCHARGE CAREGIVER [3] Mother [14] [phone] Patient Belongings The following personal items are in your possession at time of discharge: 
                             
 
  
  
 Please provide this summary of care documentation to your next provider. Signatures-by signing, you are acknowledging that this After Visit Summary has been reviewed with you and you have received a copy. Patient Signature:  ____________________________________________________________ Date:  ____________________________________________________________  
  
Earnstine Luis Provider Signature:  ____________________________________________________________ Date:  ____________________________________________________________

## 2018-09-12 NOTE — PROGRESS NOTES
1140 - patient arrived from ED for complaints of leaking fluid, efmx2 applied. 1159 - sve by dr Janee Saucedo collected by dr. Denys Fall, speculum exam by dr. Denys Fall. 1210- amnisure negative, patient to be discharged home per dr. Denys Fall. 1227 - discharge paperwork reviewed with patient and patient understands. All questions and concerns addressed. 1228 - patient ambulated off unit for discharge.

## 2019-05-13 ENCOUNTER — HOSPITAL ENCOUNTER (EMERGENCY)
Age: 24
Discharge: HOME | End: 2019-05-13
Attending: EMERGENCY MEDICINE
Payer: COMMERCIAL

## 2019-05-13 VITALS
SYSTOLIC BLOOD PRESSURE: 142 MMHG | WEIGHT: 242.06 LBS | HEIGHT: 63 IN | HEART RATE: 109 BPM | BODY MASS INDEX: 42.89 KG/M2 | TEMPERATURE: 101.7 F | RESPIRATION RATE: 18 BRPM | DIASTOLIC BLOOD PRESSURE: 84 MMHG | OXYGEN SATURATION: 100 %

## 2019-05-13 DIAGNOSIS — J02.9 ACUTE PHARYNGITIS, UNSPECIFIED ETIOLOGY: Primary | ICD-10-CM

## 2019-05-13 LAB — DEPRECATED S PYO AG THROAT QL EIA: NEGATIVE

## 2019-05-13 PROCEDURE — 87070 CULTURE OTHR SPECIMN AEROBIC: CPT

## 2019-05-13 PROCEDURE — 74011250637 HC RX REV CODE- 250/637: Performed by: PHYSICIAN ASSISTANT

## 2019-05-13 PROCEDURE — 87880 STREP A ASSAY W/OPTIC: CPT

## 2019-05-13 PROCEDURE — 99283 EMERGENCY DEPT VISIT LOW MDM: CPT

## 2019-05-13 RX ORDER — IBUPROFEN 600 MG/1
600 TABLET ORAL
Qty: 20 TAB | Refills: 0 | Status: SHIPPED | OUTPATIENT
Start: 2019-05-13 | End: 2022-02-04

## 2019-05-13 RX ORDER — AMOXICILLIN 500 MG/1
500 TABLET, FILM COATED ORAL 3 TIMES DAILY
Qty: 30 TAB | Refills: 0 | Status: SHIPPED | OUTPATIENT
Start: 2019-05-13 | End: 2019-05-23

## 2019-05-13 RX ORDER — IBUPROFEN 600 MG/1
600 TABLET ORAL
Status: COMPLETED | OUTPATIENT
Start: 2019-05-13 | End: 2019-05-13

## 2019-05-13 RX ADMIN — IBUPROFEN 600 MG: 600 TABLET, FILM COATED ORAL at 20:18

## 2019-05-14 NOTE — DISCHARGE INSTRUCTIONS

## 2019-05-14 NOTE — ED NOTES
Reports started with a sore throat last night. Complaints of chills and not feeling well. A&OX4. Maintaining oral secretions.

## 2019-05-14 NOTE — ED PROVIDER NOTES
EMERGENCY DEPARTMENT HISTORY AND PHYSICAL EXAM 
 
 
Date: 5/13/2019 Patient Name: Dianelys Gloria History of Presenting Illness Chief Complaint Patient presents with  Sore Throat  
  onset yesterday History Provided By: Patient HPI: Dianelys Gloria, 27 y.o. female with no pertinent past medical history, presents via private vehicle to the ED with cc of sore throat onset yesterday. Pain is worse with swallowing. She has had associated headache and feels feverish but has not checked her temperature. She has been taking Tylenol with some relief. She denies cough, congestion, chest pain, shortness of breath, abdominal pain, vomiting. There are no other complaints, changes, or physical findings at this time. PCP: Moni Erickson MD 
 
No current facility-administered medications on file prior to encounter. Current Outpatient Medications on File Prior to Encounter Medication Sig Dispense Refill  PNV no.106-iron-folate no6-dha 27.5 mg iron- 1 mg cap Take 1 Tab by mouth daily. Past History Past Medical History: 
Past Medical History:  
Diagnosis Date  Abdominal pain, other specified site  Keratosis pilaris 10/1/2009  Otitis media Past Surgical History: 
Past Surgical History:  
Procedure Laterality Date  HX ADENOIDECTOMY  HX GYN    
 HX HEENT    
 tubal/tonsils and adnoids  HX OTHER SURGICAL    
 tonsilectomy childhood  HX OTHER SURGICAL    
 left ovary cyst removal   
 HX TONSILLECTOMY  HX TYMPANOSTOMY Family History: 
Family History Problem Relation Age of Onset  Asthma Mother  Stroke Father  No Known Problems Sister  Asthma Brother  No Known Problems Brother  No Known Problems Sister Social History: 
Social History Tobacco Use  Smoking status: Never Smoker  Smokeless tobacco: Never Used Substance Use Topics  Alcohol use: No  
 Drug use: No  
 
 
Allergies: 
No Known Allergies Review of Systems Review of Systems Constitutional: Negative for chills and fever. HENT: Positive for sore throat. Negative for ear pain. Eyes: Negative for redness and visual disturbance. Respiratory: Negative for cough and shortness of breath. Cardiovascular: Negative for chest pain and palpitations. Gastrointestinal: Negative for abdominal pain, nausea and vomiting. Genitourinary: Negative for dysuria and hematuria. Musculoskeletal: Negative for back pain and gait problem. Skin: Negative for rash and wound. Neurological: Negative for dizziness and headaches. Psychiatric/Behavioral: Negative for behavioral problems and confusion. All other systems reviewed and are negative. Physical Exam  
Physical Exam  
Constitutional: She is oriented to person, place, and time. She appears well-developed and well-nourished. No distress. HENT:  
Head: Normocephalic and atraumatic. The uvula is erythematous but there is no significant swelling. There is mild tonsillar enlargement bilaterally. There is bilateral anterior lymphadenopathy. Eyes: Pupils are equal, round, and reactive to light. Conjunctivae and EOM are normal.  
Neck: Normal range of motion. Neck supple. Cardiovascular: Normal rate, regular rhythm and normal heart sounds. Pulmonary/Chest: Effort normal and breath sounds normal. No respiratory distress. She has no wheezes. She has no rales. Musculoskeletal: Normal range of motion. Neurological: She is alert and oriented to person, place, and time. She has normal strength. No cranial nerve deficit or sensory deficit. GCS eye subscore is 4. GCS verbal subscore is 5. GCS motor subscore is 6. Skin: Skin is warm and dry. No rash noted. Psychiatric: She has a normal mood and affect. Her behavior is normal.  
Nursing note and vitals reviewed. Diagnostic Study Results Labs - No results found for this or any previous visit (from the past 12 hour(s)). Radiologic Studies - No orders to display CT Results  (Last 48 hours) None CXR Results  (Last 48 hours) None Medical Decision Making I am the first provider for this patient. I reviewed the vital signs, available nursing notes, past medical history, past surgical history, family history and social history. Vital Signs-Reviewed the patient's vital signs. No data found. Records Reviewed: Nursing Notes and Old Medical Records Provider Notes (Medical Decision Making):  
Patient presents with sore throat. Differential diagnosis includes bacterial versus viral pharyngitis, upper respiratory infection, lymphadenitis. ED Course:  
Initial assessment performed. The patients presenting problems have been discussed, and they are in agreement with the care plan formulated and outlined with them. I have encouraged them to ask questions as they arise throughout their visit. 9:00 PM Keyur Enriquez ED tech, recheck the patient's temperature and it was 98.8 °F.  The patient feels better and is ready for discharge. Disposition: 
9:05 PM 
 
The patient has been re-evaluated and is ready for discharge. Reviewed available results with patient. Counseled patient on diagnosis and care plan. Patient has expressed understanding, and all questions have been answered. Patient agrees with plan and agrees to follow up as recommended, or to return to the ED if their symptoms worsen. Discharge instructions have been provided and explained to the patient, along with reasons to return to the ED. PLAN: 
1. Discharge Medication List as of 5/13/2019  9:07 PM  
  
START taking these medications Details  
amoxicillin 500 mg tab Take 500 mg by mouth three (3) times daily for 10 days. , Normal, Disp-30 Tab, R-0  
  
ibuprofen (MOTRIN) 600 mg tablet Take 1 Tab by mouth every six (6) hours as needed for Pain., Normal, Disp-20 Tab, R-0  
  
 aluminum-magnesium hydroxide 200-200 mg/5 mL susp 5 mL, diphenhydrAMINE 12.5 mg/5 mL liqd 12.5 mg, lidocaine 2 % soln 5 mL 5 mL by Swish and Spit route two (2) times a day. Magic mouth wash Maalox Lidocaine 2% viscous Diphenhydramine oral solution Pharmacy to mix equal portions of ingredients to a total volume as indicated in the dispense amount. , Print, Disp-90 mL,  R-0  
  
  
CONTINUE these medications which have NOT CHANGED Details PNV no.106-iron-folate no6-dha 27.5 mg iron- 1 mg cap Take 1 Tab by mouth daily. , Historical Med 2. Follow-up Information Follow up With Specialties Details Why Contact Info Teodora Beth MD Internal Medicine Schedule an appointment as soon as possible for a visit in 2 days for a recheck [address] 
[phone] \[facility] [place] [facility]\"" EMERGENCY DEPT Emergency Medicine Go to If symptoms worsen [address] [facility] 
[phone] Return to ED if worse Diagnosis Clinical Impression: 1. Acute pharyngitis, unspecified etiology Ney Hough. ALEX Smith 
 
 
3:41 PM 
I was personally available for consultation in the emergency department. I have reviewed the chart and agree with the documentation recorded by the [facility], including the assessment, treatment plan, and disposition.  
Faith Valentin MD

## 2019-05-15 LAB
BACTERIA SPEC CULT: NORMAL
SERVICE CMNT-IMP: NORMAL

## 2020-09-13 ENCOUNTER — HOSPITAL ENCOUNTER (OUTPATIENT)
Dept: PREADMISSION TESTING | Age: 25
Discharge: HOME | End: 2020-09-13
Payer: COMMERCIAL

## 2020-09-13 DIAGNOSIS — Z01.812 PRE-PROCEDURE LAB EXAM: ICD-10-CM

## 2020-09-13 PROCEDURE — 87635 SARS-COV-2 COVID-19 AMP PRB: CPT

## 2020-09-14 LAB — SARS-COV-2, COV2NT: NOT DETECTED

## 2020-09-17 ENCOUNTER — HOSPITAL ENCOUNTER (OUTPATIENT)
Age: 25
Setting detail: OUTPATIENT SURGERY
Discharge: HOME | End: 2020-09-17
Attending: SPECIALIST | Admitting: SPECIALIST
Payer: COMMERCIAL

## 2020-09-17 ENCOUNTER — ANESTHESIA EVENT (OUTPATIENT)
Dept: ENDOSCOPY | Age: 25
End: 2020-09-17
Payer: COMMERCIAL

## 2020-09-17 ENCOUNTER — ANESTHESIA (OUTPATIENT)
Dept: ENDOSCOPY | Age: 25
End: 2020-09-17
Payer: COMMERCIAL

## 2020-09-17 VITALS
SYSTOLIC BLOOD PRESSURE: 112 MMHG | BODY MASS INDEX: 40.97 KG/M2 | WEIGHT: 240 LBS | HEART RATE: 78 BPM | RESPIRATION RATE: 15 BRPM | HEIGHT: 64 IN | OXYGEN SATURATION: 100 % | TEMPERATURE: 98.7 F | DIASTOLIC BLOOD PRESSURE: 73 MMHG

## 2020-09-17 LAB — HCG UR QL: NEGATIVE

## 2020-09-17 PROCEDURE — 81025 URINE PREGNANCY TEST: CPT

## 2020-09-17 PROCEDURE — 77030021593 HC FCPS BIOP ENDOSC BSC -A: Performed by: SPECIALIST

## 2020-09-17 PROCEDURE — 76060000031 HC ANESTHESIA FIRST 0.5 HR: Performed by: SPECIALIST

## 2020-09-17 PROCEDURE — 74011250636 HC RX REV CODE- 250/636: Performed by: NURSE ANESTHETIST, CERTIFIED REGISTERED

## 2020-09-17 PROCEDURE — 74011250637 HC RX REV CODE- 250/637: Performed by: SPECIALIST

## 2020-09-17 PROCEDURE — 88305 TISSUE EXAM BY PATHOLOGIST: CPT

## 2020-09-17 PROCEDURE — 74011000250 HC RX REV CODE- 250: Performed by: NURSE ANESTHETIST, CERTIFIED REGISTERED

## 2020-09-17 PROCEDURE — 76040000019: Performed by: SPECIALIST

## 2020-09-17 PROCEDURE — 2709999900 HC NON-CHARGEABLE SUPPLY: Performed by: SPECIALIST

## 2020-09-17 RX ORDER — SODIUM CHLORIDE 0.9 % (FLUSH) 0.9 %
5-40 SYRINGE (ML) INJECTION AS NEEDED
Status: DISCONTINUED | OUTPATIENT
Start: 2020-09-17 | End: 2020-09-17 | Stop reason: HOSPADM

## 2020-09-17 RX ORDER — ATROPINE SULFATE 0.1 MG/ML
0.5 INJECTION INTRAVENOUS
Status: DISCONTINUED | OUTPATIENT
Start: 2020-09-17 | End: 2020-09-17 | Stop reason: HOSPADM

## 2020-09-17 RX ORDER — FLUMAZENIL 0.1 MG/ML
0.2 INJECTION INTRAVENOUS
Status: DISCONTINUED | OUTPATIENT
Start: 2020-09-17 | End: 2020-09-17 | Stop reason: HOSPADM

## 2020-09-17 RX ORDER — EPINEPHRINE 0.1 MG/ML
1 INJECTION INTRACARDIAC; INTRAVENOUS
Status: DISCONTINUED | OUTPATIENT
Start: 2020-09-17 | End: 2020-09-17 | Stop reason: HOSPADM

## 2020-09-17 RX ORDER — DEXTROMETHORPHAN/PSEUDOEPHED 2.5-7.5/.8
1.2 DROPS ORAL
Status: DISCONTINUED | OUTPATIENT
Start: 2020-09-17 | End: 2020-09-17 | Stop reason: HOSPADM

## 2020-09-17 RX ORDER — LIDOCAINE HYDROCHLORIDE 20 MG/ML
INJECTION, SOLUTION EPIDURAL; INFILTRATION; INTRACAUDAL; PERINEURAL AS NEEDED
Status: DISCONTINUED | OUTPATIENT
Start: 2020-09-17 | End: 2020-09-17 | Stop reason: HOSPADM

## 2020-09-17 RX ORDER — NALOXONE HYDROCHLORIDE 0.4 MG/ML
0.4 INJECTION, SOLUTION INTRAMUSCULAR; INTRAVENOUS; SUBCUTANEOUS
Status: DISCONTINUED | OUTPATIENT
Start: 2020-09-17 | End: 2020-09-17 | Stop reason: HOSPADM

## 2020-09-17 RX ORDER — SODIUM CHLORIDE 0.9 % (FLUSH) 0.9 %
5-40 SYRINGE (ML) INJECTION EVERY 8 HOURS
Status: DISCONTINUED | OUTPATIENT
Start: 2020-09-17 | End: 2020-09-17 | Stop reason: HOSPADM

## 2020-09-17 RX ORDER — PROPOFOL 10 MG/ML
INJECTION, EMULSION INTRAVENOUS AS NEEDED
Status: DISCONTINUED | OUTPATIENT
Start: 2020-09-17 | End: 2020-09-17 | Stop reason: HOSPADM

## 2020-09-17 RX ORDER — SODIUM CHLORIDE 9 MG/ML
INJECTION, SOLUTION INTRAVENOUS
Status: DISCONTINUED | OUTPATIENT
Start: 2020-09-17 | End: 2020-09-17 | Stop reason: HOSPADM

## 2020-09-17 RX ORDER — SODIUM CHLORIDE 9 MG/ML
50 INJECTION, SOLUTION INTRAVENOUS CONTINUOUS
Status: DISCONTINUED | OUTPATIENT
Start: 2020-09-17 | End: 2020-09-17 | Stop reason: HOSPADM

## 2020-09-17 RX ADMIN — SODIUM CHLORIDE: 900 INJECTION, SOLUTION INTRAVENOUS at 13:57

## 2020-09-17 RX ADMIN — LIDOCAINE HYDROCHLORIDE 40 MG: 20 INJECTION, SOLUTION EPIDURAL; INFILTRATION; INTRACAUDAL; PERINEURAL at 13:39

## 2020-09-17 RX ADMIN — PROPOFOL 100 MG: 10 INJECTION, EMULSION INTRAVENOUS at 13:38

## 2020-09-17 RX ADMIN — SODIUM CHLORIDE: 900 INJECTION, SOLUTION INTRAVENOUS at 13:39

## 2020-09-17 RX ADMIN — PROPOFOL 50 MG: 10 INJECTION, EMULSION INTRAVENOUS at 13:39

## 2020-09-17 RX ADMIN — PROPOFOL 50 MG: 10 INJECTION, EMULSION INTRAVENOUS at 13:50

## 2020-09-17 RX ADMIN — PROPOFOL 20 MG: 10 INJECTION, EMULSION INTRAVENOUS at 13:55

## 2020-09-17 RX ADMIN — PROPOFOL 50 MG: 10 INJECTION, EMULSION INTRAVENOUS at 13:45

## 2020-09-17 RX ADMIN — PROPOFOL 50 MG: 10 INJECTION, EMULSION INTRAVENOUS at 13:42

## 2020-09-17 NOTE — ANESTHESIA POSTPROCEDURE EVALUATION
Procedure(s):  COLONOSCOPY/EGD  ESOPHAGOGASTRODUODENOSCOPY (EGD)   :-  ESOPHAGOGASTRODUODENAL (EGD) BIOPSY. MAC    <BSHSIANPOST>    INITIAL Post-op Vital signs:   Vitals Value Taken Time   /55 9/17/2020  2:09 PM   Temp 37.1 °C (98.7 °F) 9/17/2020  2:06 PM   Pulse 92 9/17/2020  2:09 PM   Resp 24 9/17/2020  2:09 PM   SpO2 95 % 9/17/2020  2:09 PM   Vitals shown include unvalidated device data.

## 2020-09-17 NOTE — DISCHARGE INSTRUCTIONS
Mike Marie  [id number]  1995    COLON/EGD DISCHARGE INSTRUCTIONS  Discomfort:  Redness at IV site- apply warm compress to area; if redness or soreness persist- contact your physician  There may be a slight amount of blood passed from the rectum  Gaseous discomfort- walking, belching will help relieve any discomfort  Sore throat- throat lozenges or warm salt water gargle  You may not operate a vehicle for 12 hours  You may not engage in an occupation involving machinery or appliances for rest of today  You may not drink alcoholic beverages for at least 12 hours  Avoid making any critical decisions for at least 24 hour  DIET:   Regular diet. - however -  remember your colon is empty and a heavy meal will produce gas. Avoid these foods:  vegetables, fried / greasy foods, carbonated drinks for today. MEDICATIONS:      Regarding Aspirin or Nonsteroidal medications, please see below. ACTIVITY:  You may resume your normal daily activities it is recommended that you spend the remainder of the day resting -  avoid any strenuous activity. CALL M.D. ANY SIGN OF:  Increasing pain, nausea, vomiting  Abdominal distension (swelling)  New increased bleeding (oral or rectal)  Fever (chills)  Pain in chest area  Bloody discharge from nose or mouth  Shortness of breath    You may not  take any Advil, Aspirin, Ibuprofen, Motrin, Aleve, or Goodys for 10 days, ONLY  Tylenol as needed for pain.     Post procedure diagnosis: gastritis  Normal colon      Follow-up Instructions:   Call Dr. Jen Galarza  Results of procedure / biopsy in 10 days, take Pantoprazole as prescribed, Colon at age 50  Telephone #  [phone]      DISCHARGE SUMMARY from Nurse    The following personal items collected during your admission are returned to you:   Dental Appliance: Dental Appliances: None  Vision: Visual Aid: None  Hearing Aid:    Jewelry:    Clothing:    Other Valuables:    Valuables sent to safe:

## 2020-09-17 NOTE — PROCEDURES
[address]                 NAME:  Vianey Bardales   :   1995   MRN:   [id number]     Date/Time:  2020 2:04 PM    Esophagogastroduodenoscopy (EGD) Procedure Note    :  Shannen Rosario MD    Referring Provider:  Shahnaz Herndon MD    Anethesia/Sedation:  MAC anesthesia Propofol    Preoperative diagnosis: COLITIS/EPIGASTRIC PAIN/GERD    Postoperative diagnosis: gastritis  Normal colon    Procedure Details     After infom consent was obtained for the procedure, with all risks and benefits of procedure explained the patient was taken to the endoscopy suite and placed in the left lateral decubitus position. Following sequential administration of sedation as per above, the [id number] gastroscope was inserted into the mouth and advanced under direct vision to second portion of the duodenum. A careful inspection was made as the gastroscope was withdrawn, including a retroflexed view of the proximal stomach; findings and interventions are described below. Findings:  Esophagus:normal  Stomach:Patchy antral erythema, biopsies done. Duodenum/jejunum:normal      Therapies:  none    Specimens: antral bx           EBL: None    Complications:   None; patient tolerated the procedure well. Impression:    See Postoperative diagnosis above    Recommendations:  -Acid suppression with a proton pump inhibitor. , -Await pathology. , -No NSAIDS    Discharge disposition:  Home in the company of  when able to ambulate    Shannen Rosario MD

## 2020-09-17 NOTE — H&P
Colonoscopy History and Physical      The patient was seen and examined. Date of last colonoscopy: none, Polyps  No      The airway was assessed and documented. The problem list, past medical history, and medications were reviewed.      Patient Active Problem List   Diagnosis Code    Abdominal pain, unspecified site R10.9    Keratosis Pilaris L85.8    Ovarian cyst, left N83.202    Torsion of ovary, ovarian pedicle and fallopian tube N83.53     Social History     Socioeconomic History    Marital status: SINGLE     Spouse name: Not on file    Number of children: Not on file    Years of education: Not on file    Highest education level: Not on file   Occupational History    Not on file   Social Needs    Financial resource strain: Not on file    Food insecurity     Worry: Not on file     Inability: Not on file    Transportation needs     Medical: Not on file     Non-medical: Not on file   Tobacco Use    Smoking status: Never Smoker    Smokeless tobacco: Never Used   Substance and Sexual Activity    Alcohol use: No    Drug use: No    Sexual activity: Never   Lifestyle    Physical activity     Days per week: Not on file     Minutes per session: Not on file    Stress: Not on file   Relationships    Social connections     Talks on phone: Not on file     Gets together: Not on file     Attends Denominational service: Not on file     Active member of club or organization: Not on file     Attends meetings of clubs or organizations: Not on file     Relationship status: Not on file    Intimate partner violence     Fear of current or ex partner: Not on file     Emotionally abused: Not on file     Physically abused: Not on file     Forced sexual activity: Not on file   Other Topics Concern    Not on file   Social History Narrative    Not on file     Past Medical History:   Diagnosis Date    Abdominal pain, other specified site     Keratosis pilaris 10/1/2009    Otitis media          Prior to Admission Medications   Prescriptions Last Dose Informant Patient Reported? Taking? PNV no.106-iron-folate no6-dha 27.5 mg iron- 1 mg cap Not Taking at Unknown time  Yes No   Sig: Take 1 Tab by mouth daily. aluminum-magnesium hydroxide 200-200 mg/5 mL susp 5 mL, diphenhydrAMINE 12.5 mg/5 mL liqd 12.5 mg, lidocaine 2 % soln 5 mL Not Taking at Unknown time  No No   Si mL by Swish and Spit route two (2) times a day. Magic mouth wash   Maalox  Lidocaine 2% viscous   Diphenhydramine oral solution     Pharmacy to mix equal portions of ingredients to a total volume as indicated in the dispense amount. ibuprofen (MOTRIN) 600 mg tablet Not Taking at Unknown time  No No   Sig: Take 1 Tab by mouth every six (6) hours as needed for Pain. Facility-Administered Medications: None       The patient was seen and examined in the endoscopy suite. The airway was assessed and documented. The problem list and medications were reviewed. Chief complaint, history of present illness, and review of systems and Past medical History are positive for: epigastric pain, GERD and colitis    The heart, lungs and mental status were satisfactory for the administration of sedation and for the procedure. I discussed with the patient the objectives, risks, consequences and alternatives to the procedure. The patient was counseled at length about the risks of spring Covid-19 in the benita-operative and post-operative states including the recovery window of their procedure. The patient was made aware that spring Covid-19 after a surgical procedure may worsen their prognosis for recovering from the virus and lend to a higher morbidity and or mortality risk. The patient was given the options of postponing their procedure. All of the risks, benefits, and alternatives were discussed. The patient does  wish to proceed with the procedure.     Plan: Endoscopic procedure with sedation     Andi Garcia MD   2020  1:35 PM

## 2020-09-17 NOTE — ROUTINE PROCESS
Delvisroseann Coleman 1995 
[id number] Situation: 
Verbal report received from: Burt Nina Procedure: Procedure(s): 
COLONOSCOPY/EGD 
ESOPHAGOGASTRODUODENOSCOPY (EGD)   :- 
ESOPHAGOGASTRODUODENAL (EGD) BIOPSY Background: 
 
Preoperative diagnosis: COLITIS/EPIGASTRIC PAIN/GERD Postoperative diagnosis: gastritis Normal colon :  Dr. Karin Nunez Assistant(s): Endoscopy Technician-1: Andrey Mancini Endoscopy RN-1: Deejay Hines RN Specimens:  
ID Type Source Tests Collected by Time Destination 1 : Antrum biopsy Preservative   Karine Thompson MD 9/17/2020 1343 Pathology H. Pylori  no Assessment: 
Intra-procedure medications Anesthesia gave intra-procedure sedation and medications, see anesthesia flow sheet yes Intravenous fluids: NS@ Colleen Evens Vital signs stable Abdominal assessment: round and soft Recommendation: 
Discharge patient per MD order. Family or Friend Mother Permission to share finding with family or friend no

## 2020-09-17 NOTE — PROCEDURES
[address] 
[address] [facility], [address] [facility] Colonoscopy Procedure Note Indications:   See Preoperative Diagnosis above Referring Physician: Nasim Lopez MD 
Anesthesia/Sedation: MAC anesthesia Propofol Endoscopist:  Dr. Toshia March Assistant:  Endoscopy Technician-1: Jovana Ramirez Endoscopy RN-1: Ana Edgar RN 
Preoperative diagnosis: COLITIS/EPIGASTRIC PAIN/GERD Postoperative diagnosis: gastritis Normal colon Procedure in Detail: 
Informed consent was obtained for the procedure, including sedation. Risks of perforation, hemorrhage, adverse drug reaction, and aspiration were discussed. The patient was placed in the left lateral decubitus position. Based on the pre-procedure assessment, including review of the patient's medical history, medications, allergies, and review of systems, she had been deemed to be an appropriate candidate for moderate sedation; she was therefore sedated with the medications listed above. The patient was monitored continuously with ECG tracing, pulse oximetry, blood pressure monitoring, and direct observations. A rectal examination was performed. The [id number] was inserted into the rectum and advanced under direct vision to the terminal ileum. The quality of the colonic preparation was good. A careful inspection was made as the colonoscope was withdrawn, including a retroflexed view of the rectum; findings and interventions are described below. Appropriate photodocumentation was obtained. Findings: 
Rectum: normal 
Sigmoid: 3 mm polyp removed with cold biopsy Descending Colon: normal 
Transverse Colon: normal 
Ascending Colon: normal 
Cecum: normal 
Terminal Ileum: normal 
 
Specimens:  
 sigmoid polyp EBL: None Complications: None; patient tolerated the procedure well. Recommendations: - Await pathology. - If adenoma is present, repeat colonoscopy in 5 years. Signed By: Hugh Hansen MD 
                      September 17, 2020

## 2020-09-17 NOTE — PERIOP NOTES

## 2020-09-17 NOTE — PROCEDURES
[address]  [place], [address]                 Colonoscopy Procedure Note    Indications:   See Preoperative Diagnosis above  Referring Physician: Gregoria Recio MD  Anesthesia/Sedation: MAC anesthesia Propofol  Endoscopist:  Dr. Naina Cedillo  Assistant:  Endoscopy Technician-1: Maribell Bro  Endoscopy RN-1: Madisyn Latham RN  Preoperative diagnosis: COLITIS/EPIGASTRIC PAIN/GERD  Postoperative diagnosis: gastritis  Normal colon    Procedure in Detail:  Informed consent was obtained for the procedure, including sedation. Risks of perforation, hemorrhage, adverse drug reaction, and aspiration were discussed. The patient was placed in the left lateral decubitus position. Based on the pre-procedure assessment, including review of the patient's medical history, medications, allergies, and review of systems, she had been deemed to be an appropriate candidate for moderate sedation; she was therefore sedated with the medications listed above. The patient was monitored continuously with ECG tracing, pulse oximetry, blood pressure monitoring, and direct observations. A rectal examination was performed. The [id number] was inserted into the rectum and advanced under direct vision to the terminal ileum. The quality of the colonic preparation was good. A careful inspection was made as the colonoscope was withdrawn, including a retroflexed view of the rectum; findings and interventions are described below. Appropriate photodocumentation was obtained. Findings:  Rectum: normal  Sigmoid: normal  Descending Colon: normal  Transverse Colon: normal  Ascending Colon: normal  Cecum: normal  Terminal Ileum: normal    Specimens:    none    EBL: None    Complications: None; patient tolerated the procedure well.     Recommendations:      - Repeat colon at age 50      Signed By: Naina Cedillo MD                        September 17, 2020

## 2021-06-17 ENCOUNTER — HOSPITAL ENCOUNTER (EMERGENCY)
Age: 26
Discharge: HOME | End: 2021-06-17
Attending: EMERGENCY MEDICINE
Payer: COMMERCIAL

## 2021-06-17 ENCOUNTER — APPOINTMENT (OUTPATIENT)
Dept: ULTRASOUND IMAGING | Age: 26
End: 2021-06-17
Payer: COMMERCIAL

## 2021-06-17 VITALS
SYSTOLIC BLOOD PRESSURE: 136 MMHG | TEMPERATURE: 98 F | BODY MASS INDEX: 45.73 KG/M2 | HEART RATE: 90 BPM | HEIGHT: 64 IN | RESPIRATION RATE: 16 BRPM | WEIGHT: 267.86 LBS | DIASTOLIC BLOOD PRESSURE: 60 MMHG | OXYGEN SATURATION: 100 %

## 2021-06-17 DIAGNOSIS — O20.9 VAGINAL BLEEDING AFFECTING EARLY PREGNANCY: Primary | ICD-10-CM

## 2021-06-17 DIAGNOSIS — B96.89 BV (BACTERIAL VAGINOSIS): ICD-10-CM

## 2021-06-17 DIAGNOSIS — O23.41 URINARY TRACT INFECTION IN MOTHER DURING FIRST TRIMESTER OF PREGNANCY: ICD-10-CM

## 2021-06-17 DIAGNOSIS — N76.0 BV (BACTERIAL VAGINOSIS): ICD-10-CM

## 2021-06-17 LAB
ALBUMIN SERPL-MCNC: 3.4 G/DL (ref 3.5–5)
ALBUMIN/GLOB SERPL: 0.8 {RATIO} (ref 1.1–2.2)
ALP SERPL-CCNC: 83 U/L (ref 45–117)
ALT SERPL-CCNC: 51 U/L (ref 12–78)
ANION GAP SERPL CALC-SCNC: 4 MMOL/L (ref 5–15)
APPEARANCE UR: ABNORMAL
AST SERPL-CCNC: 37 U/L (ref 15–37)
BACTERIA URNS QL MICRO: ABNORMAL /HPF
BASOPHILS # BLD: 0 K/UL (ref 0–0.1)
BASOPHILS NFR BLD: 1 % (ref 0–1)
BILIRUB SERPL-MCNC: 0.5 MG/DL (ref 0.2–1)
BILIRUB UR QL: NEGATIVE
BUN SERPL-MCNC: 8 MG/DL (ref 6–20)
BUN/CREAT SERPL: 13 (ref 12–20)
CALCIUM SERPL-MCNC: 8.8 MG/DL (ref 8.5–10.1)
CHLORIDE SERPL-SCNC: 106 MMOL/L (ref 97–108)
CLUE CELLS VAG QL WET PREP: NORMAL
CO2 SERPL-SCNC: 27 MMOL/L (ref 21–32)
COLOR UR: ABNORMAL
CREAT SERPL-MCNC: 0.64 MG/DL (ref 0.55–1.02)
DIFFERENTIAL METHOD BLD: ABNORMAL
EOSINOPHIL # BLD: 0.1 K/UL (ref 0–0.4)
EOSINOPHIL NFR BLD: 2 % (ref 0–7)
EPITH CASTS URNS QL MICRO: ABNORMAL /LPF
ERYTHROCYTE [DISTWIDTH] IN BLOOD BY AUTOMATED COUNT: 14.6 % (ref 11.5–14.5)
GLOBULIN SER CALC-MCNC: 4.1 G/DL (ref 2–4)
GLUCOSE SERPL-MCNC: 86 MG/DL (ref 65–100)
GLUCOSE UR STRIP.AUTO-MCNC: NEGATIVE MG/DL
HCG SERPL-ACNC: ABNORMAL MIU/ML (ref 0–6)
HCG UR QL: POSITIVE
HCT VFR BLD AUTO: 36.4 % (ref 35–47)
HGB BLD-MCNC: 11.6 G/DL (ref 11.5–16)
HGB UR QL STRIP: ABNORMAL
HYALINE CASTS URNS QL MICRO: ABNORMAL /LPF (ref 0–5)
IMM GRANULOCYTES # BLD AUTO: 0 K/UL (ref 0–0.04)
IMM GRANULOCYTES NFR BLD AUTO: 0 % (ref 0–0.5)
KETONES UR QL STRIP.AUTO: NEGATIVE MG/DL
KOH PREP SPEC: NORMAL
LEUKOCYTE ESTERASE UR QL STRIP.AUTO: ABNORMAL
LYMPHOCYTES # BLD: 1.8 K/UL (ref 0.8–3.5)
LYMPHOCYTES NFR BLD: 22 % (ref 12–49)
MCH RBC QN AUTO: 27.2 PG (ref 26–34)
MCHC RBC AUTO-ENTMCNC: 31.9 G/DL (ref 30–36.5)
MCV RBC AUTO: 85.4 FL (ref 80–99)
MONOCYTES # BLD: 0.5 K/UL (ref 0–1)
MONOCYTES NFR BLD: 6 % (ref 5–13)
NEUTS SEG # BLD: 5.9 K/UL (ref 1.8–8)
NEUTS SEG NFR BLD: 69 % (ref 32–75)
NITRITE UR QL STRIP.AUTO: NEGATIVE
NRBC # BLD: 0 K/UL (ref 0–0.01)
NRBC BLD-RTO: 0 PER 100 WBC
PH UR STRIP: 6 [PH] (ref 5–8)
PLATELET # BLD AUTO: 367 K/UL (ref 150–400)
PMV BLD AUTO: 9.9 FL (ref 8.9–12.9)
POTASSIUM SERPL-SCNC: 3.7 MMOL/L (ref 3.5–5.1)
PROT SERPL-MCNC: 7.5 G/DL (ref 6.4–8.2)
PROT UR STRIP-MCNC: NEGATIVE MG/DL
RBC # BLD AUTO: 4.26 M/UL (ref 3.8–5.2)
RBC #/AREA URNS HPF: ABNORMAL /HPF (ref 0–5)
SERVICE CMNT-IMP: NORMAL
SODIUM SERPL-SCNC: 137 MMOL/L (ref 136–145)
SP GR UR REFRACTOMETRY: 1.02 (ref 1–1.03)
T VAGINALIS VAG QL WET PREP: NORMAL
UA: UC IF INDICATED,UAUC: ABNORMAL
UROBILINOGEN UR QL STRIP.AUTO: 1 EU/DL (ref 0.2–1)
WBC # BLD AUTO: 8.4 K/UL (ref 3.6–11)
WBC URNS QL MICRO: ABNORMAL /HPF (ref 0–4)

## 2021-06-17 PROCEDURE — 76801 OB US < 14 WKS SINGLE FETUS: CPT

## 2021-06-17 PROCEDURE — 36415 COLL VENOUS BLD VENIPUNCTURE: CPT

## 2021-06-17 PROCEDURE — 81025 URINE PREGNANCY TEST: CPT

## 2021-06-17 PROCEDURE — 81001 URINALYSIS AUTO W/SCOPE: CPT

## 2021-06-17 PROCEDURE — 87491 CHLMYD TRACH DNA AMP PROBE: CPT

## 2021-06-17 PROCEDURE — 87210 SMEAR WET MOUNT SALINE/INK: CPT

## 2021-06-17 PROCEDURE — 80053 COMPREHEN METABOLIC PANEL: CPT

## 2021-06-17 PROCEDURE — 84702 CHORIONIC GONADOTROPIN TEST: CPT

## 2021-06-17 PROCEDURE — 76817 TRANSVAGINAL US OBSTETRIC: CPT

## 2021-06-17 PROCEDURE — 99284 EMERGENCY DEPT VISIT MOD MDM: CPT

## 2021-06-17 PROCEDURE — 85025 COMPLETE CBC W/AUTO DIFF WBC: CPT

## 2021-06-17 RX ORDER — NITROFURANTOIN 25; 75 MG/1; MG/1
100 CAPSULE ORAL 2 TIMES DAILY
Qty: 6 CAPSULE | Refills: 0 | Status: SHIPPED | OUTPATIENT
Start: 2021-06-17 | End: 2021-06-20

## 2021-06-17 RX ORDER — METRONIDAZOLE 500 MG/1
500 TABLET ORAL 2 TIMES DAILY
Qty: 14 TABLET | Refills: 0 | Status: SHIPPED | OUTPATIENT
Start: 2021-06-17 | End: 2021-06-24

## 2021-06-17 RX ORDER — METRONIDAZOLE 500 MG/1
500 TABLET ORAL 2 TIMES DAILY
Qty: 14 TABLET | Refills: 0 | Status: SHIPPED | OUTPATIENT
Start: 2021-06-17 | End: 2021-06-17 | Stop reason: SDUPTHER

## 2021-06-17 NOTE — LETTER
[address] 
[facility] EMERGENCY DEPT 
[address] [place] [address] [place] Runner [id number] [phone] Work/School Note Date: 6/17/2021 To Whom It May concern: 
 
 
Ariella Smiley was seen and treated today in the emergency room. She may return to work in 1 to 2 days, as symptoms improve. Sincerely, Luis Carlos Torres

## 2021-06-18 NOTE — ED PROVIDER NOTES
EMERGENCY DEPARTMENT HISTORY AND PHYSICAL EXAM      Date: 2021  Patient Name: Holland Enriquez    History of Presenting Illness     Chief Complaint   Patient presents with    Vaginal Bleeding     LMP . pt is 4-5 weeks pregnant. She had abdominal cramping earlier today. She got home about 30 min ago and noted a fair amount of vaginal bleeding       History Provided By: Patient    HPI: Holland Enriquez, 27 y.o. female presents ambulatory to the emergency department with concern for bleeding during early pregnancy. Patient states she is approximately 4 to 5 weeks pregnant. Her last normal menstrual period was 2021. She is  A1. She states she lost her prior pregnancy at approximately 8 months given she had a large cyst on the ovary and this impacted the pregnancy. She denied any bleeding with her prior pregnancies. She has had no dysuria. She denied vaginal discharge. She states she is not scheduled to see the OB/GYN at [facility] [place] for a few more weeks, but she became alarmed when she noted continued bleeding throughout the morning. She has had some mild pelvic cramping. She has had no nausea vomiting. No rash or lesion. No fever or chills. She denied any pain at present. Pt is o/w healthy without cough, congestion, ST, shortness of breath, chest pain. There are no other complaints, changes, or physical findings at this time. PCP: Reyes Rojas MD    Current Outpatient Medications   Medication Sig Dispense Refill    nitrofurantoin, macrocrystal-monohydrate, (Macrobid) 100 mg capsule Take 1 Capsule by mouth two (2) times a day for 3 days. 6 Capsule 0    ibuprofen (MOTRIN) 600 mg tablet Take 1 Tab by mouth every six (6) hours as needed for Pain. 20 Tab 0    aluminum-magnesium hydroxide 200-200 mg/5 mL susp 5 mL, diphenhydrAMINE 12.5 mg/5 mL liqd 12.5 mg, lidocaine 2 % soln 5 mL 5 mL by Swish and Spit route two (2) times a day.  Magic mouth wash   Maalox  Lidocaine 2% viscous   Diphenhydramine oral solution     Pharmacy to mix equal portions of ingredients to a total volume as indicated in the dispense amount. 90 mL 0    PNV no.106-iron-folate no6-dha 27.5 mg iron- 1 mg cap Take 1 Tab by mouth daily. Past History     Past Medical History:  Past Medical History:   Diagnosis Date    Abdominal pain, other specified site     Keratosis pilaris 10/1/2009    Otitis media        Past Surgical History:  Past Surgical History:   Procedure Laterality Date    COLONOSCOPY N/A 9/17/2020    COLONOSCOPY/EGD performed by Santos Liu [facility] at [facility] ENDOSCOPY    HX ADENOIDECTOMY      HX GYN      HX HEENT      tubal/tonsils and adnoids    HX OTHER SURGICAL      tonsilectomy childhood    HX OTHER SURGICAL      left ovary cyst removal     HX TONSILLECTOMY      HX TYMPANOSTOMY         Family History:  Family History   Problem Relation Age of Onset    Asthma Mother     Stroke Father     No Known Problems Sister     Asthma Brother     No Known Problems Brother     No Known Problems Sister        Social History:  Social History     Tobacco Use    Smoking status: Never Smoker    Smokeless tobacco: Never Used   Substance Use Topics    Alcohol use: No    Drug use: No       Allergies:  No Known Allergies      Review of Systems   Review of Systems   Constitutional: Negative for chills and fever. HENT: Negative for congestion, rhinorrhea and sore throat. Respiratory: Negative for cough and shortness of breath. Cardiovascular: Negative for chest pain and palpitations. Gastrointestinal: Negative for abdominal pain, diarrhea, nausea and vomiting. Endocrine: Negative for polydipsia, polyphagia and polyuria. Genitourinary: Positive for pelvic pain and vaginal bleeding. Negative for decreased urine volume, difficulty urinating, dysuria, flank pain, frequency, genital sores, hematuria, urgency, vaginal discharge and vaginal pain.    Musculoskeletal: Negative for neck pain and neck stiffness. Skin: Negative for color change, pallor, rash and wound. Allergic/Immunologic: Negative for food allergies and immunocompromised state. Neurological: Negative for dizziness and headaches. Hematological: Negative for adenopathy. Does not bruise/bleed easily. Psychiatric/Behavioral: Negative for agitation and confusion. The patient is nervous/anxious. All other systems reviewed and are negative. Physical Exam   Physical Exam  Vitals and nursing note reviewed. Constitutional:       General: She is not in acute distress. Appearance: Normal appearance. She is well-developed and normal weight. She is not ill-appearing, toxic-appearing or diaphoretic. HENT:      Head: Normocephalic and atraumatic. Nose: Nose normal.      Mouth/Throat:      Mouth: Mucous membranes are moist.      Pharynx: No oropharyngeal exudate. Eyes:      General: No scleral icterus. Right eye: No discharge. Left eye: No discharge. Extraocular Movements: Extraocular movements intact. Conjunctiva/sclera: Conjunctivae normal.      Pupils: Pupils are equal, round, and reactive to light. Neck:      Thyroid: No thyromegaly. Vascular: No JVD. Trachea: No tracheal deviation. Cardiovascular:      Rate and Rhythm: Normal rate and regular rhythm. Pulses: Normal pulses. Heart sounds: Normal heart sounds. Pulmonary:      Effort: Pulmonary effort is normal. No respiratory distress. Breath sounds: Normal breath sounds. No wheezing. Abdominal:      General: Abdomen is flat. There is no distension. Palpations: Abdomen is soft. Tenderness: There is no abdominal tenderness. There is no right CVA tenderness, guarding or rebound. Genitourinary:     Vagina: No vaginal discharge. Comments: See PELVIC exam  Musculoskeletal:         General: No deformity. Normal range of motion. Cervical back: Normal range of motion and neck supple.    Skin: General: Skin is warm and dry. Neurological:      General: No focal deficit present. Mental Status: She is alert and oriented to person, place, and time. Sensory: No sensory deficit. Motor: No weakness or abnormal muscle tone. Coordination: Coordination normal.   Psychiatric:         Mood and Affect: Mood normal.         Behavior: Behavior normal.         Judgment: Judgment normal.       Pelvic Exam    Date/Time: 6/17/2021 8:35 PM  Performed by: PA  Procedure duration:  10 minutes. Documented by:  Jeannie Ashley PA-C. Exam assisted by:  Du Pizano RN. Type of exam performed: bimanual and speculum. External genitalia appearance: normal.    Vaginal exam:  bleeding. Bleeding: mild  Cervical exam:  os closed, normal and no cervical motion tenderness. Specimen(s) collected:  chlamydia and GC.   Bimanual exam:  normal.    Patient tolerance: patient tolerated the procedure well with no immediate complications          Diagnostic Study Results     Labs -     Recent Results (from the past 12 hour(s))   URINALYSIS W/ REFLEX CULTURE    Collection Time: 06/17/21  5:36 PM    Specimen: Urine   Result Value Ref Range    Color YELLOW/STRAW      Appearance CLOUDY (A) CLEAR      Specific gravity 1.019 1.003 - 1.030      pH (UA) 6.0 5.0 - 8.0      Protein Negative NEG mg/dL    Glucose Negative NEG mg/dL    Ketone Negative NEG mg/dL    Bilirubin Negative NEG      Blood LARGE (A) NEG      Urobilinogen 1.0 0.2 - 1.0 EU/dL    Nitrites Negative NEG      Leukocyte Esterase SMALL (A) NEG      WBC 5-10 0 - 4 /hpf    RBC 5-10 0 - 5 /hpf    Epithelial cells MODERATE (A) FEW /lpf    Bacteria 2+ (A) NEG /hpf    UA:UC IF INDICATED CULTURE NOT INDICATED BY UA RESULT CNI      Hyaline cast 0-2 0 - 5 /lpf   HCG URINE, QL. - POC    Collection Time: 06/17/21  5:39 PM   Result Value Ref Range    Pregnancy test,urine (POC) Positive (A) NEG     CBC WITH AUTOMATED DIFF    Collection Time: 06/17/21  5:48 PM   Result Value Ref Range    WBC 8.4 3.6 - 11.0 K/uL    RBC 4.26 3.80 - 5.20 M/uL    HGB 11.6 11.5 - 16.0 g/dL    HCT 36.4 35.0 - 47.0 %    MCV 85.4 80.0 - 99.0 FL    MCH 27.2 26.0 - 34.0 PG    MCHC 31.9 30.0 - 36.5 g/dL    RDW 14.6 (H) 11.5 - 14.5 %    PLATELET 493 402 - 832 K/uL    MPV 9.9 8.9 - 12.9 FL    NRBC 0.0 0  WBC    ABSOLUTE NRBC 0.00 0.00 - 0.01 K/uL    NEUTROPHILS 69 32 - 75 %    LYMPHOCYTES 22 12 - 49 %    MONOCYTES 6 5 - 13 %    EOSINOPHILS 2 0 - 7 %    BASOPHILS 1 0 - 1 %    IMMATURE GRANULOCYTES 0 0.0 - 0.5 %    ABS. NEUTROPHILS 5.9 1.8 - 8.0 K/UL    ABS. LYMPHOCYTES 1.8 0.8 - 3.5 K/UL    ABS. MONOCYTES 0.5 0.0 - 1.0 K/UL    ABS. EOSINOPHILS 0.1 0.0 - 0.4 K/UL    ABS. BASOPHILS 0.0 0.0 - 0.1 K/UL    ABS. IMM. GRANS. 0.0 0.00 - 0.04 K/UL    DF AUTOMATED     METABOLIC PANEL, COMPREHENSIVE    Collection Time: 06/17/21  5:48 PM   Result Value Ref Range    Sodium 137 136 - 145 mmol/L    Potassium 3.7 3.5 - 5.1 mmol/L    Chloride 106 97 - 108 mmol/L    CO2 27 21 - 32 mmol/L    Anion gap 4 (L) 5 - 15 mmol/L    Glucose 86 65 - 100 mg/dL    BUN 8 6 - 20 MG/DL    Creatinine 0.64 0.55 - 1.02 MG/DL    BUN/Creatinine ratio 13 12 - 20      GFR est AA >60 >60 ml/min/1.73m2    GFR est non-AA >60 >60 ml/min/1.73m2    Calcium 8.8 8.5 - 10.1 MG/DL    Bilirubin, total 0.5 0.2 - 1.0 MG/DL    ALT (SGPT) 51 12 - 78 U/L    AST (SGOT) 37 15 - 37 U/L    Alk. phosphatase 83 45 - 117 U/L    Protein, total 7.5 6.4 - 8.2 g/dL    Albumin 3.4 (L) 3.5 - 5.0 g/dL    Globulin 4.1 (H) 2.0 - 4.0 g/dL    A-G Ratio 0.8 (L) 1.1 - 2.2     BETA HCG, QT    Collection Time: 06/17/21  5:48 PM   Result Value Ref Range    Beta HCG, QT 10,177 (H) 0 - 6 MIU/ML       Radiologic Studies -   US UTS TRANSVAGINAL OB   Final Result      1. Intrauterine gestational sac with estimated age of 10 weeks 4 days +/- 3 days. A yolk sac is present, with no fetal pole yet visualized, likely representing an   early IUP. Clinical follow-up recommended.             US PREG UTS < 14 WKS SNGL   Final Result      1. Intrauterine gestational sac with estimated age of 10 weeks 4 days +/- 3 days. A yolk sac is present, with no fetal pole yet visualized, likely representing an   early IUP. Clinical follow-up recommended. Medical Decision Making   I am the first provider for this patient. I reviewed the vital signs, available nursing notes, past medical history, past surgical history, family history and social history. Vital Signs-Reviewed the patient's vital signs. Patient Vitals for the past 12 hrs:   Temp Pulse Resp BP SpO2   06/17/21 1713 98 °F (36.7 °C) 90 16 136/60 100 %       Records Reviewed: Nursing Notes, Old Medical Records, Previous Radiology Studies and Previous Laboratory Studies    Provider Notes (Medical Decision Making):   Threatened miscarriage, UTI, ovarian cyst, subchorionic hemorrhage, STD    ED Course:   Initial assessment performed. The patients presenting problems have been discussed, and they are in agreement with the care plan formulated and outlined with them. I have encouraged them to ask questions as they arise throughout their visit. Pt encouraged to follow up with her Ob/Gyn within 48 hours for repeat HCG level. DISCHARGE NOTE:  The care plan has been outline with the patient and/or family, who verbally conveyed understanding and agreement. Available results have been reviewed. Patient and/or family understand the follow up plan as outlined and discharge instructions. Should their condition deterioration at any time after discharge the patient agrees to return, follow up sooner than outlined or seek medical assistance at the closest Emergency Room as soon as possible. Questions have been answered.  Discharge instructions and educational information regarding the patient's diagnosis as well a list of reasons why the patient would want to seek immediate medical attention, should their condition change, were reviewed directly with the patient/family          PLAN:  1. Current Discharge Medication List      START taking these medications    Details   nitrofurantoin, macrocrystal-monohydrate, (Macrobid) 100 mg capsule Take 1 Capsule by mouth two (2) times a day for 3 days. Qty: 6 Capsule, Refills: 0  Start date: 6/17/2021, End date: 6/20/2021           2. Follow-up Information     Follow up With Specialties Details Why Contact Info    Your Ob/Gyn        [facility] EMERGENCY DEPT Emergency Medicine  If symptoms worsen [address] [place] [address] Drive  [address] [facility]  [phone]        Return to ED if worse     Diagnosis     Clinical Impression:   1. Vaginal bleeding affecting early pregnancy    2.  Urinary tract infection in mother during first trimester of pregnancy

## 2021-06-18 NOTE — ED NOTES
Spoke with charge RN, Jonna Pham., and triage RN, Cheikh Victor - they are aware of the patient's status and will look after her until discharge.

## 2021-06-18 NOTE — DISCHARGE INSTRUCTIONS
Follow up with your Ob/Gyn in 2 days for recheck of your HCG level. Complete all antibiotics as prescribed. Push fluids. Return to the Emergency Dept for any worsening pain, bleeding.

## 2021-06-21 LAB
C TRACH RRNA SPEC QL NAA+PROBE: NEGATIVE
N GONORRHOEA RRNA SPEC QL NAA+PROBE: NEGATIVE
PLEASE NOTE:, 188601: NORMAL
SPECIMEN SOURCE: NORMAL

## 2021-06-28 LAB
ANTIBODY SCREEN, EXTERNAL: NEGATIVE
CHLAMYDIA, EXTERNAL: NEGATIVE
CHLAMYDIA, EXTERNAL: NORMAL
HBSAG, EXTERNAL: NORMAL
HEPATITIS C AB,   EXT: NORMAL
HIV, EXTERNAL: NORMAL
N. GONORRHEA, EXTERNAL: NEGATIVE
RUBELLA, EXTERNAL: NORMAL
T. PALLIDUM, EXTERNAL: NEGATIVE
TYPE, ABO & RH, EXTERNAL: NORMAL

## 2021-08-23 ENCOUNTER — HOSPITAL ENCOUNTER (EMERGENCY)
Age: 26
Discharge: HOME | End: 2021-08-23
Attending: EMERGENCY MEDICINE
Payer: COMMERCIAL

## 2021-08-23 VITALS
HEIGHT: 64 IN | TEMPERATURE: 97.7 F | HEART RATE: 104 BPM | OXYGEN SATURATION: 99 % | BODY MASS INDEX: 46.9 KG/M2 | WEIGHT: 274.69 LBS | SYSTOLIC BLOOD PRESSURE: 141 MMHG | DIASTOLIC BLOOD PRESSURE: 72 MMHG | RESPIRATION RATE: 20 BRPM

## 2021-08-23 DIAGNOSIS — Z20.822 COUGH WITH EXPOSURE TO COVID-19 VIRUS: ICD-10-CM

## 2021-08-23 DIAGNOSIS — R05.8 COUGH WITH EXPOSURE TO COVID-19 VIRUS: ICD-10-CM

## 2021-08-23 DIAGNOSIS — J02.9 SORE THROAT: Primary | ICD-10-CM

## 2021-08-23 LAB — SARS-COV-2, COV2: NORMAL

## 2021-08-23 PROCEDURE — U0005 INFEC AGEN DETEC AMPLI PROBE: HCPCS

## 2021-08-23 PROCEDURE — 99282 EMERGENCY DEPT VISIT SF MDM: CPT

## 2021-08-23 NOTE — Clinical Note
[address]  [facility] EMERGENCY DEPT  [address] [facility] [id number] [phone]    Work/School Note    Date: 8/23/2021    To Whom It May concern:    Alan Logan was seen and treated today in the emergency room by the following provider(s):  Attending Provider: Poly Hernandez MD.      Alan Logan is excused from work/school on 08/23/21 and 08/24/21. She is medically clear to return to work/school on 8/25/2021.        Sincerely,          Jared Garcia MD

## 2021-08-23 NOTE — ED NOTES
Mary PATEL reviewed discharge instructions with the patient. The patient verbalized understanding. All questions and concerns were addressed. The patient is discharged ambulatory with instructions and prescriptions in hand. Pt is alert and oriented x 4. Respirations are clear and unlabored.

## 2021-08-23 NOTE — ED PROVIDER NOTES
EMERGENCY DEPARTMENT HISTORY AND PHYSICAL EXAM      Date: 8/23/2021  Patient Name: Clark May    History of Presenting Illness     Chief Complaint   Patient presents with    Concern For COVID-19 (Coronavirus)     Patient has a mutual friend of someone who is Covid + and the mutual friend has been in contact with both recently so she is concerned she was exposed. Patient endorses throat irritation but denies other sx. History Provided By: Patient    HPI: Clark May, 27 y.o. female with no significant past medical history who is G2, P1 at approximately 14 weeks gestation presents to the ED with mild headache and chronic cough and concern for possible Covid exposure. Patient reports that a family friend visited them and potentially was Covid positive. She woke up tonight and had mild sore throat. That along with a mild headache and chronic cough prompted her to come to the ED for evaluation. She also is here with her child and significant other who were to be tested as well. Patient denies any fevers or chills. Denies any abdominal pain, chest pain, nausea, vomiting, diarrhea. She is followed at [address] [place] [address] for her pregnancy. She is a non-smoker. PCP: Thad Cao MD    No current facility-administered medications on file prior to encounter. Current Outpatient Medications on File Prior to Encounter   Medication Sig Dispense Refill    ibuprofen (MOTRIN) 600 mg tablet Take 1 Tab by mouth every six (6) hours as needed for Pain. 20 Tab 0    aluminum-magnesium hydroxide 200-200 mg/5 mL susp 5 mL, diphenhydrAMINE 12.5 mg/5 mL liqd 12.5 mg, lidocaine 2 % soln 5 mL 5 mL by Swish and Spit route two (2) times a day. Magic mouth wash   Maalox  Lidocaine 2% viscous   Diphenhydramine oral solution     Pharmacy to mix equal portions of ingredients to a total volume as indicated in the dispense amount. 90 mL 0    PNV no.106-iron-folate no6-dha 27.5 mg iron- 1 mg cap Take 1 Tab by mouth daily. Past History     Past Medical History:  Past Medical History:   Diagnosis Date    Abdominal pain, other specified site     Keratosis pilaris 10/1/2009    Otitis media        Past Surgical History:  Past Surgical History:   Procedure Laterality Date    COLONOSCOPY N/A 9/17/2020    COLONOSCOPY[facility]EGD performed [facility] at [facility] ENDOSCOPY    HX ADENOIDECTOMY      HX GYN      HX HEENT      tubal/tonsils and adnoids    HX OTHER SURGICAL      tonsilectomy childhood    HX OTHER SURGICAL      left ovary cyst removal     HX TONSILLECTOMY      HX TYMPANOSTOMY         Family History:  Family History   Problem Relation Age of Onset    Asthma Mother     Stroke Father     No Known Problems Sister     Asthma Brother     No Known Problems Brother     No Known Problems Sister        Social History:  Social History     Tobacco Use    Smoking status: Never Smoker    Smokeless tobacco: Never Used   Substance Use Topics    Alcohol use: No    Drug use: No       Allergies:  No Known Allergies      Review of Systems   Review of Systems   Constitutional: Negative for chills and fever. HENT: Positive for sore throat. Negative for congestion, ear pain and rhinorrhea. Eyes: Negative for visual disturbance. Respiratory: Positive for cough. Negative for chest tightness, shortness of breath and wheezing. Cardiovascular: Negative for chest pain and palpitations. Gastrointestinal: Negative for abdominal pain, diarrhea, nausea and vomiting. Genitourinary: Negative for dysuria, flank pain and hematuria. Musculoskeletal: Negative for back pain, myalgias and neck pain. Skin: Negative for rash and wound. Neurological: Positive for headaches. Negative for syncope. Psychiatric/Behavioral: Negative for confusion. The patient is nervous/anxious. All other systems reviewed and are negative.         Physical Exam    General appearance - well nourished, well appearing, and in no distress  Eyes - pupils equal and reactive, extraocular eye movements intact  ENT - mucous membranes moist, pharynx normal without lesions  Neck - supple, no significant adenopathy; non-tender to palpation  Chest - clear to auscultation, no wheezes, rales or rhonchi; non-tender to palpation  Heart - normal rate and regular rhythm, S1 and S2 normal, no murmurs noted  Abdomen - soft, fundus palpable approximately 5 cm below umbilicus. , no masses or organomegaly  Musculoskeletal - no joint tenderness, deformity or swelling; normal ROM  Extremities - peripheral pulses normal, no pedal edema  Skin - normal coloration and turgor, no rashes  Neurological - alert, oriented x3, normal speech, no focal findings or movement disorder noted    Diagnostic Study Results     Labs -     Recent Results (from the past 12 hour(s))   SARS-COV-2    Collection Time: 08/23/21  5:24 AM   Result Value Ref Range    SARS-CoV-2 Please find results under separate order         Radiologic Studies -   No orders to display     CT Results  (Last 48 hours)    None        CXR Results  (Last 48 hours)    None            Medical Decision Making   I am the first provider for this patient. I reviewed the vital signs, available nursing notes, past medical history, past surgical history, family history and social history. Vital Signs-Reviewed the patient's vital signs. Patient Vitals for the past 12 hrs:   Temp Pulse Resp BP SpO2   08/23/21 0450 97.7 °F (36.5 °C) (!) 104 20 (!) 141/72 99 %           Records Reviewed: Nursing Notes and Old Medical Records    Provider Notes (Medical Decision Making):       ED Course:   Initial assessment performed. The patients presenting problems have been discussed, and they are in agreement with the care plan formulated and outlined with them. I have encouraged them to ask questions as they arise throughout their visit. Disposition:      PLAN:  1. Current Discharge Medication List        2.    Follow-up Information     Follow up With Specialties Details Why Contact Info    [facility] EMERGENCY DEPT Emergency Medicine  If symptoms worsen [facility]    Mary Segovia MD Internal Medicine Schedule an appointment as soon as possible for a visit   [address] [place] [address]  [phone]          Return to ED if worse     Diagnosis     Clinical Impression:   1. Sore throat    2.  Cough with exposure to COVID-19 virus

## 2021-08-24 LAB
SARS-COV-2, XPLCVT: NOT DETECTED
SOURCE, COVRS: NORMAL

## 2021-11-16 ENCOUNTER — HOSPITAL ENCOUNTER (EMERGENCY)
Age: 26
Discharge: HOME | End: 2021-11-17
Attending: OBSTETRICS & GYNECOLOGY | Admitting: OBSTETRICS & GYNECOLOGY
Payer: COMMERCIAL

## 2021-11-16 LAB
ALT SERPL-CCNC: 15 U/L (ref 12–78)
AST SERPL-CCNC: 11 U/L (ref 15–37)
BASOPHILS # BLD: 0 K/UL (ref 0–0.1)
BASOPHILS NFR BLD: 0 % (ref 0–1)
CREAT SERPL-MCNC: 0.57 MG/DL (ref 0.55–1.02)
CREAT UR-MCNC: 219 MG/DL
DIFFERENTIAL METHOD BLD: ABNORMAL
EOSINOPHIL # BLD: 0.1 K/UL (ref 0–0.4)
EOSINOPHIL NFR BLD: 1 % (ref 0–7)
ERYTHROCYTE [DISTWIDTH] IN BLOOD BY AUTOMATED COUNT: 13.6 % (ref 11.5–14.5)
HCT VFR BLD AUTO: 31.8 % (ref 35–47)
HGB BLD-MCNC: 10.5 G/DL (ref 11.5–16)
IMM GRANULOCYTES # BLD AUTO: 0 K/UL (ref 0–0.04)
IMM GRANULOCYTES NFR BLD AUTO: 0 % (ref 0–0.5)
LYMPHOCYTES # BLD: 2.5 K/UL (ref 0.8–3.5)
LYMPHOCYTES NFR BLD: 23 % (ref 12–49)
MCH RBC QN AUTO: 27.9 PG (ref 26–34)
MCHC RBC AUTO-ENTMCNC: 33 G/DL (ref 30–36.5)
MCV RBC AUTO: 84.4 FL (ref 80–99)
MONOCYTES # BLD: 0.6 K/UL (ref 0–1)
MONOCYTES NFR BLD: 5 % (ref 5–13)
NEUTS SEG # BLD: 7.7 K/UL (ref 1.8–8)
NEUTS SEG NFR BLD: 71 % (ref 32–75)
NRBC # BLD: 0 K/UL (ref 0–0.01)
NRBC BLD-RTO: 0 PER 100 WBC
PLATELET # BLD AUTO: 335 K/UL (ref 150–400)
PMV BLD AUTO: 10 FL (ref 8.9–12.9)
PROT UR-MCNC: 24 MG/DL (ref 0–11.9)
PROT/CREAT UR-RTO: 0.1
RBC # BLD AUTO: 3.77 M/UL (ref 3.8–5.2)
WBC # BLD AUTO: 11 K/UL (ref 3.6–11)

## 2021-11-16 PROCEDURE — 84156 ASSAY OF PROTEIN URINE: CPT

## 2021-11-16 PROCEDURE — 82565 ASSAY OF CREATININE: CPT

## 2021-11-16 PROCEDURE — 84450 TRANSFERASE (AST) (SGOT): CPT

## 2021-11-16 PROCEDURE — 84460 ALANINE AMINO (ALT) (SGPT): CPT

## 2021-11-16 PROCEDURE — 75810000275 HC EMERGENCY DEPT VISIT NO LEVEL OF CARE

## 2021-11-16 PROCEDURE — 36415 COLL VENOUS BLD VENIPUNCTURE: CPT

## 2021-11-16 PROCEDURE — 85025 COMPLETE CBC W/AUTO DIFF WBC: CPT

## 2021-11-17 VITALS
TEMPERATURE: 98.6 F | HEIGHT: 64 IN | OXYGEN SATURATION: 97 % | WEIGHT: 268 LBS | RESPIRATION RATE: 18 BRPM | SYSTOLIC BLOOD PRESSURE: 96 MMHG | DIASTOLIC BLOOD PRESSURE: 54 MMHG | BODY MASS INDEX: 45.75 KG/M2 | HEART RATE: 86 BPM

## 2021-11-17 PROCEDURE — 59025 FETAL NON-STRESS TEST: CPT

## 2021-11-17 PROCEDURE — 99285 EMERGENCY DEPT VISIT HI MDM: CPT

## 2021-11-17 PROCEDURE — 76815 OB US LIMITED FETUS(S): CPT

## 2021-11-17 NOTE — H&P
History & Physical    Name: Steven Cronin MRN: [id number]  SSN: [id number]    YOB: 1995  Age: 27 y.o. Sex: female        Subjective:   CC: S/P mild abdominal trauma  Estimated Date of Delivery: 22  OB History    Para Term  AB Living   3 2 1 1   1   SAB IAB Ectopic Molar Multiple Live Births             1      # Outcome Date GA Lbr Mike/2nd Weight Sex Delivery Anes PTL Lv   3 Current            2 Term 10/02/18 39w5d    Vag-Spont   MICKY   1          FD       Ms. Sarah Beth Shepherd presents with pregnancy at 27w1d for evaluation of a mild trauma where her upper abdomen was lightly struck when a  metal cooking sheet was pushed into her upper abdomen at work several hours ago. She complains of abdominal pain that is mild, crampy in both her upper and lower abdomen alternatively since this happened. No ROM, vaginal bleeding, decreased FM. Prenatal course was complicated by obesity with BMI> 36, H/O IUFD secondary to abruption at 27 weeks in a prior pregnancy. Please see prenatal records for details.     Past Medical History:   Diagnosis Date    Abdominal pain, other specified site     Keratosis pilaris 10/1/2009    Otitis media      Past Surgical History:   Procedure Laterality Date    COLONOSCOPY N/A 2020    COLONOSCOPY/EGD performed by Michelle Suarez MD at [address] [place] [address] ENDOSCOPY    HX ADENOIDECTOMY      HX GYN      HX HEENT      tubal/tonsils and adnoids    HX OTHER SURGICAL      tonsilectomy childhood    HX OTHER SURGICAL      left ovary cyst removal     HX TONSILLECTOMY      HX TYMPANOSTOMY       Social History     Occupational History    Not on file   Tobacco Use    Smoking status: Never Smoker    Smokeless tobacco: Never Used   Substance and Sexual Activity    Alcohol use: No    Drug use: No    Sexual activity: Never     Family History   Problem Relation Age of Onset    Asthma Mother     Stroke Father     No Known Problems Sister     Asthma Brother     No Known Problems Brother     No Known Problems Sister      No Known Allergies  Prior to Admission medications    Medication Sig Start Date End Date Taking? Authorizing Provider   PNV no.106-iron-folate no6-dha 27.5 mg iron- 1 mg cap Take 1 Tab by mouth daily. Yes Other, MD Ansley   ibuprofen (MOTRIN) 600 mg tablet Take 1 Tab by mouth every six (6) hours as needed for Pain. Patient not taking: Reported on 11/16/2021 5/13/19   REBECCA Morgan   aluminum-magnesium hydroxide 200-200 mg/5 mL susp 5 mL, diphenhydrAMINE 12.5 mg/5 mL liqd 12.5 mg, lidocaine 2 % soln 5 mL 5 mL by Swish and Spit route two (2) times a day. Magic mouth wash   Maalox  Lidocaine 2% viscous   Diphenhydramine oral solution     Pharmacy to mix equal portions of ingredients to a total volume as indicated in the dispense amount. Patient not taking: Reported on 11/16/2021 5/13/19   REBECCA Morgan        Review of Systems   Constitutional: Negative for chills and fever. HENT: Negative for mouth sores and sore throat. Eyes: Negative for visual disturbance. Respiratory: Negative for cough, shortness of breath and wheezing. Cardiovascular: Negative for chest pain, palpitations and leg swelling. Gastrointestinal: Negative for constipation, diarrhea, nausea and vomiting. Endocrine: Negative for cold intolerance and heat intolerance. Genitourinary: Negative for dysuria, frequency, genital sores, hematuria, vaginal bleeding and vaginal discharge. Musculoskeletal: Negative for arthralgias, back pain and myalgias. Skin: Negative for rash. Neurological: Negative for dizziness and headaches. Hematological: Negative for adenopathy. Psychiatric/Behavioral: Negative for agitation, behavioral problems, confusion and decreased concentration.        Objective:     Vitals:  Vitals:    11/16/21 2109 11/16/21 2140   BP: 124/71    Pulse: 84    Resp: 18    Temp: 97.8 °F (36.6 °C)    SpO2: 100%    Weight: 124.6 kg (274 lb 11.1 oz) 121.6 kg (268 lb)   Height: 5' 4\" (1.626 m) 5' 4\" (1.626 m)        Physical Exam  Vitals and nursing note reviewed. Exam conducted with a chaperone present. Constitutional:       General: She is not in acute distress. Appearance: Normal appearance. She is obese. She is not ill-appearing, toxic-appearing or diaphoretic. HENT:      Head: Normocephalic and atraumatic. Right Ear: External ear normal.      Left Ear: External ear normal.   Eyes:      General: No scleral icterus. Right eye: No discharge. Left eye: No discharge. Extraocular Movements: Extraocular movements intact. Cardiovascular:      Rate and Rhythm: Regular rhythm. Heart sounds: Normal heart sounds. No murmur heard. No friction rub. No gallop. Pulmonary:      Effort: Pulmonary effort is normal. No respiratory distress. Breath sounds: Normal breath sounds. No stridor. No wheezing, rhonchi or rales. Abdominal:      General: Bowel sounds are normal. There is no distension. Palpations: Abdomen is soft. There is no mass. Tenderness: There is no abdominal tenderness. There is no right CVA tenderness, left CVA tenderness, guarding or rebound. Hernia: No hernia is present. Comments: No ecchymosis on abdomen   Musculoskeletal:         General: No swelling, tenderness, deformity or signs of injury. Normal range of motion. Cervical back: Normal range of motion and neck supple. No rigidity or tenderness. Right lower leg: No edema. Left lower leg: No edema. Lymphadenopathy:      Cervical: No cervical adenopathy. Skin:     General: Skin is warm and dry. Capillary Refill: Capillary refill takes less than 2 seconds. Coloration: Skin is not jaundiced or pale. Findings: No bruising, erythema, lesion or rash. Neurological:      Mental Status: She is alert and oriented to person, place, and time.       Deep Tendon Reflexes: Reflexes normal.   Psychiatric:         Mood and Affect: Mood normal.         Behavior: Behavior normal.         Thought Content: Thought content normal.         Judgment: Judgment normal.         Uterine Activity: None  Fetal Heart Rate: Reactive      BPP 10/10, see PN  Prenatal Labs:   No results found for: ABORH, RUBELLAEXT, GRBSEXT, HBSAGEXT, HIVEXT, RPREXT, GONNOEXT, CHLAMEXT, ABORHEXT, RUBELLAEXT, GRBSEXT, HBSAGEXT, HIVEXT, RPREXT, GONNOEXT, CHLAMEXT       Impression/Plan:     1) S/P mild abdominal trauma at 27 w 1 d  2) Isolated elevated BPs     Plan: Observation,CBC,CMP,PCR. Serial BPs. Plan per result. Addendum: Labs normal and BPs have been normal since my evaluation. Pt is now 6 hrs out from the incident and has good APFT. Pt given FMC,pain/contraction,PTL, and tox precautions. To follow up for BP check in office with in 48 hours.

## 2021-11-17 NOTE — PROGRESS NOTES
:Pt arrived from work with complaints of abdominal pain due to a metal tray hitting her abdomen. Pt states pain moves around her abdomen and not in one single spot. Pt denies leaking of fluid or vag bleeding, h/a, blurry vision. Pt is  and 27w1d. Pt sees Dr. Jessa Monae. : Dr. Nelda Strickland at the bedside to examine pt. Ultrasound done, orders received to draw labs and continue EFM.     0005: Orders received from Dr Nelda Strickland to discharge patient home. 0030: Pt discharged home. Discharge instructions given.

## 2021-11-17 NOTE — PROGRESS NOTES
BPP:    Breathing > 30 secs--------------------> 2 Pts    Gross FM, multiple----------------------> 2 Pts    Tone FM, multiple------------------------> 2 Pts    CARLOS= 14.1-----------------------------------> 2 Pts    NST, reactive, Cat 1---------------------> 2 Pts    BPP 10/10    Vtx    Anterior normal placenta w/o retroplacental lucencies    This study was preformed and interpreted by myself.     Bryon Spears M.D.

## 2021-11-17 NOTE — DISCHARGE INSTRUCTIONS
Patient Education        Pregnancy Precautions: Care Instructions  Your Care Instructions     There is no sure way to prevent labor before your due date ( labor) or to prevent most other pregnancy problems. But there are things you can do to increase your chances of a healthy pregnancy. Go to your appointments, follow your doctor's advice, and take good care of yourself. Eat well, and exercise (if your doctor agrees). And make sure to drink plenty of water. Follow-up care is a key part of your treatment and safety. Be sure to make and go to all appointments, and call your doctor if you are having problems. It's also a good idea to know your test results and keep a list of the medicines you take. How can you care for yourself at home? · Make sure you go to your prenatal appointments. At each visit, your doctor will check your blood pressure. Your doctor will also check to see if you have protein in your urine. High blood pressure and protein in urine are signs of preeclampsia. This condition can be dangerous for you and your baby. · Drink plenty of fluids. Dehydration can cause contractions. If you have kidney, heart, or liver disease and have to limit fluids, talk with your doctor before you increase the amount of fluids you drink. · Tell your doctor right away if you notice any symptoms of an infection, such as:  ? Burning when you urinate. ? A foul-smelling discharge from your vagina. ? Vaginal itching. ? Unexplained fever. ? Unusual pain or soreness in your uterus or lower belly. · Eat a balanced diet. Include plenty of foods that are high in calcium and iron. ? Foods high in calcium include milk, cheese, yogurt, almonds, and broccoli. ? Foods high in iron include red meat, shellfish, poultry, eggs, beans, raisins, whole-grain bread, and leafy green vegetables. · Do not smoke. If you need help quitting, talk to your doctor about stop-smoking programs and medicines.  These can increase your chances of quitting for good. · Do not drink alcohol or use marijuana or illegal drugs. · Follow your doctor's directions about activity. Your doctor will let you know how much, if any, exercise you can do. · Ask your doctor if you can have sex. If you are at risk for early labor, your doctor may ask you to not have sex. · Take care to prevent falls. During pregnancy, your joints are loose, and your balance is off. Sports such as bicycling, skiing, or in-line skating can increase your risk of falling. And don't ride horses or motorcycles, dive, water ski, scuba dive, or parachute jump while you are pregnant. · Avoid getting very hot. Do not use saunas or hot tubs. Avoid staying out in the sun in hot weather for long periods. Take acetaminophen (Tylenol) to lower a high fever. · Do not take any over-the-counter or herbal medicines or supplements without talking to your doctor or pharmacist first.  When should you call for help? Call 911  anytime you think you may need emergency care. For example, call if:    · You passed out (lost consciousness).     · You have a seizure.     · You have severe vaginal bleeding.     · You have severe pain in your belly or pelvis.     · You have had fluid gushing or leaking from your vagina and you know or think the umbilical cord is bulging into your vagina. If this happens, immediately get down on your knees so your rear end (buttocks) is higher than your head. This will decrease the pressure on the cord until help arrives. Call your doctor now or seek immediate medical care if:    · You have signs of preeclampsia, such as:  ? Sudden swelling of your face, hands, or feet. ? New vision problems (such as dimness, blurring, or seeing spots). ? A severe headache.     · You have any vaginal bleeding.     · You have belly pain or cramping.     · You have a fever.     · You have had regular contractions (with or without pain) for an hour.  This means that you have 8 or more within 1 hour or 4 or more in 20 minutes after you change your position and drink fluids.     · You have a sudden release of fluid from your vagina.     · You have low back pain or pelvic pressure that does not go away.     · You notice that your baby has stopped moving or is moving much less than normal.   Watch closely for changes in your health, and be sure to contact your doctor if you have any problems. Where can you learn more? Go to http://www.SMART.com/  Enter Y951 in the search box to learn more about \"Pregnancy Precautions: Care Instructions. \"  Current as of: 2021               Content Version: 13.0  © 3587-7047 [employer]. Care instructions adapted under license by [employer] (which disclaims liability or warranty for this information). If you have questions about a medical condition or this instruction, always ask your healthcare professional. [place] [address] any warranty or liability for your use of this information. [employer] Activation    Thank you for requesting access to [employer]. Please follow the instructions below to securely access and download your online medical record. [employer] allows you to send messages to your doctor, view your test results, renew your prescriptions, schedule appointments, and more. How Do I Sign Up? 1. In your internet browser, go to [email or web address]  2. Click on the First Time User? Click Here link in the Sign In box. You will be redirect to the New Member Sign Up page. 3. Enter your [employer] Access Code exactly as it appears below. You will not need to use this code after youve completed the sign-up process. If you do not sign up before the expiration date, you must request a new code. [employer] Access Code: Activation code not generated  Current [employer] Status: Active (This is the date your [employer] access code will )    4.  Enter the last four digits of your Social Security Number (xxxx) and Date of Birth (mm/dd/yyyy) as indicated and click Submit. You will be taken to the next sign-up page. 5. Create a [employer] ID. This will be your [employer] login ID and cannot be changed, so think of one that is secure and easy to remember. 6. Create a [employer] password. You can change your password at any time. 7. Enter your Password Reset Question and Answer. This can be used at a later time if you forget your password. 8. Enter your e-mail address. You will receive e-mail notification when new information is available in [address] 9. Click Sign Up. You can now view and download portions of your medical record. 10. Click the Download Summary menu link to download a portable copy of your medical information. Additional Information    If you have questions, please visit the Frequently Asked Questions section of the [employer] website at [email or web address] Remember, [employer] is NOT to be used for urgent needs. For medical emergencies, dial 911.

## 2021-12-29 ENCOUNTER — HOSPITAL ENCOUNTER (EMERGENCY)
Age: 26
Discharge: HOME | End: 2021-12-29
Attending: EMERGENCY MEDICINE | Admitting: OBSTETRICS & GYNECOLOGY
Payer: COMMERCIAL

## 2021-12-29 VITALS
BODY MASS INDEX: 45.75 KG/M2 | OXYGEN SATURATION: 97 % | WEIGHT: 268 LBS | TEMPERATURE: 98.1 F | HEIGHT: 64 IN | HEART RATE: 107 BPM | DIASTOLIC BLOOD PRESSURE: 72 MMHG | RESPIRATION RATE: 16 BRPM | SYSTOLIC BLOOD PRESSURE: 125 MMHG

## 2021-12-29 DIAGNOSIS — N30.00 ACUTE CYSTITIS WITHOUT HEMATURIA: ICD-10-CM

## 2021-12-29 DIAGNOSIS — R10.9 ABDOMINAL PAIN DURING PREGNANCY IN THIRD TRIMESTER: ICD-10-CM

## 2021-12-29 DIAGNOSIS — O26.893 ABDOMINAL PAIN DURING PREGNANCY IN THIRD TRIMESTER: ICD-10-CM

## 2021-12-29 DIAGNOSIS — K52.9 GASTROENTERITIS: Primary | ICD-10-CM

## 2021-12-29 LAB
ALBUMIN SERPL-MCNC: 2.4 G/DL (ref 3.5–5)
ALBUMIN/GLOB SERPL: 0.6 {RATIO} (ref 1.1–2.2)
ALP SERPL-CCNC: 94 U/L (ref 45–117)
ALT SERPL-CCNC: 16 U/L (ref 12–78)
ANION GAP SERPL CALC-SCNC: 6 MMOL/L (ref 5–15)
APPEARANCE UR: ABNORMAL
AST SERPL-CCNC: 17 U/L (ref 15–37)
BACTERIA URNS QL MICRO: ABNORMAL /HPF
BASOPHILS # BLD: 0 K/UL (ref 0–0.1)
BASOPHILS NFR BLD: 0 % (ref 0–1)
BILIRUB SERPL-MCNC: 0.6 MG/DL (ref 0.2–1)
BILIRUB UR QL: NEGATIVE
BUN SERPL-MCNC: 7 MG/DL (ref 6–20)
BUN/CREAT SERPL: 11 (ref 12–20)
CALCIUM SERPL-MCNC: 8.9 MG/DL (ref 8.5–10.1)
CHLORIDE SERPL-SCNC: 107 MMOL/L (ref 97–108)
CO2 SERPL-SCNC: 23 MMOL/L (ref 21–32)
COLOR UR: ABNORMAL
CREAT SERPL-MCNC: 0.61 MG/DL (ref 0.55–1.02)
DIFFERENTIAL METHOD BLD: ABNORMAL
EOSINOPHIL # BLD: 0 K/UL (ref 0–0.4)
EOSINOPHIL NFR BLD: 0 % (ref 0–7)
EPITH CASTS URNS QL MICRO: ABNORMAL /LPF
ERYTHROCYTE [DISTWIDTH] IN BLOOD BY AUTOMATED COUNT: 13.3 % (ref 11.5–14.5)
GLOBULIN SER CALC-MCNC: 4.3 G/DL (ref 2–4)
GLUCOSE SERPL-MCNC: 90 MG/DL (ref 65–100)
GLUCOSE UR STRIP.AUTO-MCNC: NEGATIVE MG/DL
HCT VFR BLD AUTO: 34.4 % (ref 35–47)
HGB BLD-MCNC: 11.1 G/DL (ref 11.5–16)
HGB UR QL STRIP: NEGATIVE
IMM GRANULOCYTES # BLD AUTO: 0 K/UL (ref 0–0.04)
IMM GRANULOCYTES NFR BLD AUTO: 0 % (ref 0–0.5)
KETONES UR QL STRIP.AUTO: NEGATIVE MG/DL
LEUKOCYTE ESTERASE UR QL STRIP.AUTO: ABNORMAL
LYMPHOCYTES # BLD: 0.8 K/UL (ref 0.8–3.5)
LYMPHOCYTES NFR BLD: 7 % (ref 12–49)
MCH RBC QN AUTO: 27.1 PG (ref 26–34)
MCHC RBC AUTO-ENTMCNC: 32.3 G/DL (ref 30–36.5)
MCV RBC AUTO: 84.1 FL (ref 80–99)
MONOCYTES # BLD: 0.5 K/UL (ref 0–1)
MONOCYTES NFR BLD: 4 % (ref 5–13)
NEUTS SEG # BLD: 10.6 K/UL (ref 1.8–8)
NEUTS SEG NFR BLD: 89 % (ref 32–75)
NITRITE UR QL STRIP.AUTO: NEGATIVE
NRBC # BLD: 0 K/UL (ref 0–0.01)
NRBC BLD-RTO: 0 PER 100 WBC
OTHER,OTHU: ABNORMAL
PH UR STRIP: 6.5 [PH] (ref 5–8)
PLATELET # BLD AUTO: 344 K/UL (ref 150–400)
PMV BLD AUTO: 9.8 FL (ref 8.9–12.9)
POTASSIUM SERPL-SCNC: 3.9 MMOL/L (ref 3.5–5.1)
PROT SERPL-MCNC: 6.7 G/DL (ref 6.4–8.2)
PROT UR STRIP-MCNC: NEGATIVE MG/DL
RBC # BLD AUTO: 4.09 M/UL (ref 3.8–5.2)
RBC #/AREA URNS HPF: ABNORMAL /HPF (ref 0–5)
RBC MORPH BLD: ABNORMAL
SODIUM SERPL-SCNC: 136 MMOL/L (ref 136–145)
SP GR UR REFRACTOMETRY: 1.02 (ref 1–1.03)
UA: UC IF INDICATED,UAUC: ABNORMAL
UROBILINOGEN UR QL STRIP.AUTO: 1 EU/DL (ref 0.2–1)
WBC # BLD AUTO: 11.9 K/UL (ref 3.6–11)
WBC URNS QL MICRO: ABNORMAL /HPF (ref 0–4)

## 2021-12-29 PROCEDURE — 74011250636 HC RX REV CODE- 250/636: Performed by: PHYSICIAN ASSISTANT

## 2021-12-29 PROCEDURE — 59025 FETAL NON-STRESS TEST: CPT

## 2021-12-29 PROCEDURE — 85025 COMPLETE CBC W/AUTO DIFF WBC: CPT

## 2021-12-29 PROCEDURE — 80053 COMPREHEN METABOLIC PANEL: CPT

## 2021-12-29 PROCEDURE — 36415 COLL VENOUS BLD VENIPUNCTURE: CPT

## 2021-12-29 PROCEDURE — 75810000275 HC EMERGENCY DEPT VISIT NO LEVEL OF CARE

## 2021-12-29 PROCEDURE — 81001 URINALYSIS AUTO W/SCOPE: CPT

## 2021-12-29 PROCEDURE — 87086 URINE CULTURE/COLONY COUNT: CPT

## 2021-12-29 PROCEDURE — 99283 EMERGENCY DEPT VISIT LOW MDM: CPT

## 2021-12-29 PROCEDURE — 96361 HYDRATE IV INFUSION ADD-ON: CPT

## 2021-12-29 PROCEDURE — 96374 THER/PROPH/DIAG INJ IV PUSH: CPT

## 2021-12-29 RX ORDER — ONDANSETRON 4 MG/1
4 TABLET, ORALLY DISINTEGRATING ORAL
Qty: 20 TABLET | Refills: 0 | Status: SHIPPED | OUTPATIENT
Start: 2021-12-29 | End: 2022-02-04

## 2021-12-29 RX ORDER — SODIUM CHLORIDE 9 MG/ML
1000 INJECTION, SOLUTION INTRAVENOUS ONCE
Status: COMPLETED | OUTPATIENT
Start: 2021-12-29 | End: 2021-12-29

## 2021-12-29 RX ORDER — ONDANSETRON 2 MG/ML
4 INJECTION INTRAMUSCULAR; INTRAVENOUS
Status: COMPLETED | OUTPATIENT
Start: 2021-12-29 | End: 2021-12-29

## 2021-12-29 RX ORDER — CEPHALEXIN 500 MG/1
500 CAPSULE ORAL 2 TIMES DAILY
Qty: 14 CAPSULE | Refills: 0 | Status: SHIPPED | OUTPATIENT
Start: 2021-12-29 | End: 2022-01-05

## 2021-12-29 RX ADMIN — ONDANSETRON 4 MG: 2 INJECTION INTRAMUSCULAR; INTRAVENOUS at 17:49

## 2021-12-29 RX ADMIN — SODIUM CHLORIDE 1000 ML: 9 INJECTION, SOLUTION INTRAVENOUS at 17:50

## 2021-12-29 NOTE — Clinical Note
[facility] EMERGENCY DEPT  [address] [facility]  [place] [address]  [phone]    Work/School Note    Date: 12/29/2021    To Whom It May concern:    Lupe Ontiveros was seen and treated today in the emergency room by the following provider(s):  Attending Provider: Lulu Cuevas MD  Physician Assistant: REBECCA Bender. Lupe Ontiveros is excused from work/school on 12/29/21 and 12/30/21. She is medically clear to return to work/school on 12/31/2021.        Sincerely,          REBECCA Patel

## 2021-12-29 NOTE — ED PROVIDER NOTES
EMERGENCY DEPARTMENT HISTORY AND PHYSICAL EXAM      Date: 12/29/2021  Patient Name: Moo Borges    History of Presenting Illness     Chief Complaint   Patient presents with    Vomiting     N/V started today; reports her son was sick few days prior. Unable to keep sips down. Needs work note. Pt is 33 weeks pregnant, reports +fetal movement. History Provided By: Patient    HPI: Moo Borges, 27 y.o. female with no significant past medical history, presents to the ED with cc of vomiting and diarrhea onset this morning. She has had multiple episodes of each and is unable to keep any fluids down. She has associated intermittent abdominal cramping. Her son was sick with similar symptoms a few days ago. She is currently 33 weeks pregnant but denies any changes in fetal movement, vaginal bleeding, or fluid leaking. She has an appointment with her OB tomorrow. She denies fevers, dysuria. There are no other complaints, changes, or physical findings at this time. PCP: Concha Benson MD    No current facility-administered medications on file prior to encounter. Current Outpatient Medications on File Prior to Encounter   Medication Sig Dispense Refill    ibuprofen (MOTRIN) 600 mg tablet Take 1 Tab by mouth every six (6) hours as needed for Pain. (Patient not taking: Reported on 11/16/2021) 20 Tab 0    aluminum-magnesium hydroxide 200-200 mg/5 mL susp 5 mL, diphenhydrAMINE 12.5 mg/5 mL liqd 12.5 mg, lidocaine 2 % soln 5 mL 5 mL by Swish and Spit route two (2) times a day. Magic mouth wash   Maalox  Lidocaine 2% viscous   Diphenhydramine oral solution     Pharmacy to mix equal portions of ingredients to a total volume as indicated in the dispense amount. (Patient not taking: Reported on 11/16/2021) 90 mL 0    PNV no.106-iron-folate no6-dha 27.5 mg iron- 1 mg cap Take 1 Tab by mouth daily.          Past History     Past Medical History:  Past Medical History:   Diagnosis Date    Abdominal pain, other specified site     Keratosis pilaris 10/1/2009    Otitis media        Past Surgical History:  Past Surgical History:   Procedure Laterality Date    COLONOSCOPY N/A 9/17/2020    COLONOSCOPY/EGD performed by Tyler Do [facility] at [facility] ENDOSCOPY    HX ADENOIDECTOMY      HX GYN      HX HEENT      tubal/tonsils and adnoids    HX OTHER SURGICAL      tonsilectomy childhood    HX OTHER SURGICAL      left ovary cyst removal     HX TONSILLECTOMY      HX TYMPANOSTOMY         Family History:  Family History   Problem Relation Age of Onset    Asthma Mother     Stroke Father     No Known Problems Sister     Asthma Brother     No Known Problems Brother     No Known Problems Sister        Social History:  Social History     Tobacco Use    Smoking status: Never Smoker    Smokeless tobacco: Never Used   Substance Use Topics    Alcohol use: No    Drug use: No       Allergies:  No Known Allergies      Review of Systems   Review of Systems   Constitutional: Negative for chills and fever. HENT: Negative for ear pain and sore throat. Eyes: Negative for redness and visual disturbance. Respiratory: Negative for cough and shortness of breath. Cardiovascular: Negative for chest pain and palpitations. Gastrointestinal: Positive for diarrhea, nausea and vomiting. Negative for abdominal pain and blood in stool. Genitourinary: Negative for dysuria and hematuria. Musculoskeletal: Negative for back pain and gait problem. Skin: Negative for rash and wound. Neurological: Negative for dizziness and headaches. Psychiatric/Behavioral: Negative for behavioral problems and confusion. All other systems reviewed and are negative. Physical Exam   Physical Exam  Constitutional:       Appearance: She is not toxic-appearing. HENT:      Head: Normocephalic and atraumatic. Mouth/Throat:      Mouth: Mucous membranes are moist.   Eyes:      Extraocular Movements: Extraocular movements intact.       Pupils: Pupils are equal, round, and reactive to light. Cardiovascular:      Rate and Rhythm: Normal rate and regular rhythm. Pulmonary:      Effort: Pulmonary effort is normal. No respiratory distress. Breath sounds: Normal breath sounds. No wheezing. Abdominal:      Comments: Gravid abdomen. No focal tenderness to palpation. Musculoskeletal:         General: No deformity. Normal range of motion. Cervical back: Normal range of motion and neck supple. Skin:     General: Skin is warm and dry. Neurological:      General: No focal deficit present. Mental Status: She is alert and oriented to person, place, and time. Psychiatric:         Behavior: Behavior normal.           Diagnostic Study Results     Labs -     Recent Results (from the past 12 hour(s))   CBC WITH AUTOMATED DIFF    Collection Time: 12/29/21  1:04 PM   Result Value Ref Range    WBC 11.9 (H) 3.6 - 11.0 K/uL    RBC 4.09 3.80 - 5.20 M/uL    HGB 11.1 (L) 11.5 - 16.0 g/dL    HCT 34.4 (L) 35.0 - 47.0 %    MCV 84.1 80.0 - 99.0 FL    MCH 27.1 26.0 - 34.0 PG    MCHC 32.3 30.0 - 36.5 g/dL    RDW 13.3 11.5 - 14.5 %    PLATELET 293 992 - 536 K/uL    MPV 9.8 8.9 - 12.9 FL    NRBC 0.0 0  WBC    ABSOLUTE NRBC 0.00 0.00 - 0.01 K/uL    NEUTROPHILS 89 (H) 32 - 75 %    LYMPHOCYTES 7 (L) 12 - 49 %    MONOCYTES 4 (L) 5 - 13 %    EOSINOPHILS 0 0 - 7 %    BASOPHILS 0 0 - 1 %    IMMATURE GRANULOCYTES 0 0.0 - 0.5 %    ABS. NEUTROPHILS 10.6 (H) 1.8 - 8.0 K/UL    ABS. LYMPHOCYTES 0.8 0.8 - 3.5 K/UL    ABS. MONOCYTES 0.5 0.0 - 1.0 K/UL    ABS. EOSINOPHILS 0.0 0.0 - 0.4 K/UL    ABS. BASOPHILS 0.0 0.0 - 0.1 K/UL    ABS. IMM.  GRANS. 0.0 0.00 - 0.04 K/UL    DF SMEAR SCANNED      RBC COMMENTS NORMOCYTIC, NORMOCHROMIC     METABOLIC PANEL, COMPREHENSIVE    Collection Time: 12/29/21  1:04 PM   Result Value Ref Range    Sodium 136 136 - 145 mmol/L    Potassium 3.9 3.5 - 5.1 mmol/L    Chloride 107 97 - 108 mmol/L    CO2 23 21 - 32 mmol/L    Anion gap 6 5 - 15 mmol/L    Glucose 90 65 - 100 mg/dL    BUN 7 6 - 20 MG/DL    Creatinine 0.61 0.55 - 1.02 MG/DL    BUN/Creatinine ratio 11 (L) 12 - 20      GFR est AA >60 >60 ml/min/1.73m2    GFR est non-AA >60 >60 ml/min/1.73m2    Calcium 8.9 8.5 - 10.1 MG/DL    Bilirubin, total 0.6 0.2 - 1.0 MG/DL    ALT (SGPT) 16 12 - 78 U/L    AST (SGOT) 17 15 - 37 U/L    Alk. phosphatase 94 45 - 117 U/L    Protein, total 6.7 6.4 - 8.2 g/dL    Albumin 2.4 (L) 3.5 - 5.0 g/dL    Globulin 4.3 (H) 2.0 - 4.0 g/dL    A-G Ratio 0.6 (L) 1.1 - 2.2     URINALYSIS W/ REFLEX CULTURE    Collection Time: 12/29/21  1:04 PM    Specimen: Urine   Result Value Ref Range    Color YELLOW/STRAW      Appearance CLOUDY (A) CLEAR      Specific gravity 1.025 1.003 - 1.030      pH (UA) 6.5 5.0 - 8.0      Protein Negative NEG mg/dL    Glucose Negative NEG mg/dL    Ketone Negative NEG mg/dL    Bilirubin Negative NEG      Blood Negative NEG      Urobilinogen 1.0 0.2 - 1.0 EU/dL    Nitrites Negative NEG      Leukocyte Esterase MODERATE (A) NEG      WBC 10-20 0 - 4 /hpf    RBC 0-5 0 - 5 /hpf    Epithelial cells MANY (A) FEW /lpf    Bacteria 2+ (A) NEG /hpf    UA:UC IF INDICATED URINE CULTURE ORDERED (A) CNI      Other: Renal Epithelial cells Present         Radiologic Studies -   No orders to display     CT Results  (Last 48 hours)    None        CXR Results  (Last 48 hours)    None            Medical Decision Making   I am the first provider for this patient. I reviewed the vital signs, available nursing notes, past medical history, past surgical history, family history and social history. Vital Signs-Reviewed the patient's vital signs.   Patient Vitals for the past 12 hrs:   Temp Pulse Resp BP SpO2   12/29/21 1601 98.1 °F (36.7 °C) (!) 107 16 125/72 97 %         Records Reviewed: Nursing Notes and Old Medical Records      Provider Notes (Medical Decision Making):   DDx: viral gastroenteritis, urinary tract infection, dehydration, electrolyte abnormality    Patient presents with vomiting and diarrhea. She is pregnant but denies any bleeding or changes in fetal movement. Plan to check basic labs and treat with IV fluids and antiemetic. ED Course:   Initial assessment performed. The patients presenting problems have been discussed, and they are in agreement with the care plan formulated and outlined with them. I have encouraged them to ask questions as they arise throughout their visit. ED Course as of 12/30/21 0025   Wed Dec 29, 2021   4140 Rechecked patient and she feels better. She is tolerating oral intake. I was advised by nurse that there was a medication error and the patient was accidentally given a hydrocodone. I discussed this with the patient. Given the patient is having abdominal cramping, will send to L&D for further evaluation of pregnancy. [TANIA]      ED Course User Index  [TANIA] Luis Carlos Reina         Disposition:  9:39 PM  The patient has been re-evaluated and is ready for discharge. Reviewed available results with patient. Counseled patient on diagnosis and care plan. Patient has expressed understanding, and all questions have been answered. Patient agrees with plan and agrees to follow up as recommended, or to return to the ED if their symptoms worsen. Discharge instructions have been provided and explained to the patient, along with reasons to return to the ED. PLAN:  1. Discharge Medication List as of 12/29/2021  9:39 PM        2. Follow-up Information     Follow up With Specialties Details Why Contact Info    Your OB/gyn  Go in 1 day as scheduled     [facility] [place] EM[facility] DE[facility] Emergency Medicine Go to  If symptoms worsen [address] Gary Moraes MD Internal Medicine   [address] [place] [address]  [phone]          Return to ED if worse     Diagnosis     Clinical Impression:   1. Gastroenteritis    2. Abdominal pain during pregnancy in third trimester    3. Acute cystitis without hematuria            Geryl Councilman.  ALEX Smith

## 2021-12-30 LAB
BACTERIA SPEC CULT: NORMAL
CC UR VC: NORMAL
SERVICE CMNT-IMP: NORMAL

## 2021-12-30 NOTE — H&P
History & Physical    Name: Kieran Lechuga MRN: [id number]  SSN: [id number]    YOB: 1995  Age: 27 y.o. Sex: female        Subjective:   CC: nausea and vomiting since 0900 hrs  Estimated Date of Delivery: 22  OB History    Para Term  AB Living   3 2 1 1   1   SAB IAB Ectopic Molar Multiple Live Births             1      # Outcome Date GA Lbr Mike/2nd Weight Sex Delivery Anes PTL Lv   3 Current            2 Term 10/02/18 39w5d    Vag-Spont   MICKY   1          FD       Ms. Tri Case presents with pregnancy at 33w2d for c/o nausea and vomiting since 0900 hrs and was seen in the ED for this,evaluated, and treated. She was sent for c/o of upper abdominal cramping that has since resolved. No ROM,vaginal bleeding, or decreased FM. Pt states she feels sleepy after IV meds. Son had same symptoms recently. Prenatal course was normal. Please see prenatal records for details.     Past Medical History:   Diagnosis Date    Abdominal pain, other specified site     Keratosis pilaris 10/1/2009    Otitis media      Past Surgical History:   Procedure Laterality Date    COLONOSCOPY N/A 2020    COLONOSCOPY[facility]EGD performed by Meek Fine [facility] at [facility] ENDOSCOPY    HX ADENOIDECTOMY      HX GYN      HX HEENT      tubal/tonsils and adnoids    HX OTHER SURGICAL      tonsilectomy childhood    HX OTHER SURGICAL      left ovary cyst removal     HX TONSILLECTOMY      HX TYMPANOSTOMY       Social History     Occupational History    Not on file   Tobacco Use    Smoking status: Never Smoker    Smokeless tobacco: Never Used   Substance and Sexual Activity    Alcohol use: No    Drug use: No    Sexual activity: Never     Family History   Problem Relation Age of Onset    Asthma Mother     Stroke Father     No Known Problems Sister     Asthma Brother     No Known Problems Brother     No Known Problems Sister      No Known Allergies  Prior to Admission medications    Medication Sig Start Date End Date Taking? Authorizing Provider   ondansetron (ZOFRAN ODT) 4 mg disintegrating tablet Take 1 Tablet by mouth every eight (8) hours as needed for Nausea or Vomiting. 12/29/21  Yes Beckman, Geryl Councilman, PA   cephALEXin (Keflex) 500 mg capsule Take 1 Capsule by mouth two (2) times a day for 7 days. 12/29/21 1/5/22 Yes Beckman, Geryl Councilman, PA   ibuprofen (MOTRIN) 600 mg tablet Take 1 Tab by mouth every six (6) hours as needed for Pain. Patient not taking: Reported on 11/16/2021 5/13/19   ForREBECCA Valdez   aluminum-magnesium hydroxide 200-200 mg/5 mL susp 5 mL, diphenhydrAMINE 12.5 mg/5 mL liqd 12.5 mg, lidocaine 2 % soln 5 mL 5 mL by Swish and Spit route two (2) times a day. Magic mouth wash   Maalox  Lidocaine 2% viscous   Diphenhydramine oral solution     Pharmacy to mix equal portions of ingredients to a total volume as indicated in the dispense amount. Patient not taking: Reported on 11/16/2021 5/13/19   Beckman, Geryl Councilman, PA   PNV no.106-iron-folate no6-dha 27.5 mg iron- 1 mg cap Take 1 Tab by mouth daily. Other, MD [place]        Review of Systems   Constitutional: Negative for chills and fever. HENT: Negative for mouth sores and sore throat. Eyes: Negative for photophobia and visual disturbance. Respiratory: Negative for cough, shortness of breath and wheezing. Cardiovascular: Negative for chest pain, palpitations and leg swelling. Gastrointestinal: Positive for nausea and vomiting. Negative for abdominal pain, constipation and diarrhea. Endocrine: Negative for cold intolerance and heat intolerance. Genitourinary: Negative for dysuria, genital sores, hematuria, vaginal bleeding and vaginal discharge. Musculoskeletal: Positive for back pain. Negative for arthralgias and myalgias. Skin: Negative for rash. Neurological: Negative for dizziness and headaches. Hematological: Negative for adenopathy.    Psychiatric/Behavioral: Negative for agitation, behavioral problems, confusion and decreased concentration. Objective:     Vitals:  Vitals:    12/29/21 1601   BP: 125/72   Pulse: (!) 107   Resp: 16   Temp: 98.1 °F (36.7 °C)   SpO2: 97%        Physical Exam  Nursing note reviewed. Exam conducted with a chaperone present. Constitutional:       General: She is not in acute distress. Appearance: Normal appearance. She is obese. She is not ill-appearing, toxic-appearing or diaphoretic. HENT:      Head: Normocephalic and atraumatic. Right Ear: External ear normal.      Left Ear: External ear normal.   Eyes:      General: No scleral icterus. Right eye: No discharge. Left eye: No discharge. Extraocular Movements: Extraocular movements intact. Cardiovascular:      Rate and Rhythm: Normal rate and regular rhythm. Heart sounds: Normal heart sounds. No murmur heard. No friction rub. No gallop. Pulmonary:      Effort: Pulmonary effort is normal. No respiratory distress. Breath sounds: Normal breath sounds. No stridor. No wheezing, rhonchi or rales. Abdominal:      General: Bowel sounds are normal. There is no distension. Palpations: Abdomen is soft. There is no mass. Tenderness: There is no abdominal tenderness. There is no right CVA tenderness, left CVA tenderness, guarding or rebound. Hernia: No hernia is present. Genitourinary:     General: Normal vulva. Musculoskeletal:         General: No swelling, tenderness, deformity or signs of injury. Cervical back: Normal range of motion and neck supple. No rigidity or tenderness. Right lower leg: No edema. Left lower leg: No edema. Lymphadenopathy:      Cervical: No cervical adenopathy. Skin:     General: Skin is warm and dry. Coloration: Skin is not jaundiced or pale. Findings: No bruising, erythema, lesion or rash. Neurological:      Mental Status: She is alert and oriented to person, place, and time.       Deep Tendon Reflexes: Reflexes normal. Psychiatric:         Mood and Affect: Mood normal.         Behavior: Behavior normal.         Thought Content: Thought content normal.         Judgment: Judgment normal.         Cervical Exam: Closed/Thick/High  Uterine Activity: None  Membranes: Intact  Fetal Heart Rate: Reactive     Prenatal Labs:   No results found for: ABORH, RUBELLAEXT, GRBSEXT, HBSAGEXT, HIVEXT, RPREXT, GONNOEXT, CHLAMEXT, ABORHEXT, RUBELLAEXT, GRBSEXT, HBSAGEXT, HIVEXT, RPREXT, GONNOEXT, CHLAMEXT       Impression/Plan:     1) Viral gastritis  2) IUP at 33+ weeks with no evidence of PTL. Plan: Home with PTL/FM precautions. Clear liquids for 12 hrs than smaller meals. Follow up as scheduled with Dr. Stuart Medley next week.

## 2021-12-30 NOTE — DISCHARGE INSTRUCTIONS
Patient Education        Pregnancy Precautions: Care Instructions  Your Care Instructions     There is no sure way to prevent labor before your due date ( labor) or to prevent most other pregnancy problems. But there are things you can do to increase your chances of a healthy pregnancy. Go to your appointments, follow your doctor's advice, and take good care of yourself. Eat well, and exercise (if your doctor agrees). And make sure to drink plenty of water. Follow-up care is a key part of your treatment and safety. Be sure to make and go to all appointments, and call your doctor if you are having problems. It's also a good idea to know your test results and keep a list of the medicines you take. How can you care for yourself at home? · Make sure you go to your prenatal appointments. At each visit, your doctor will check your blood pressure. Your doctor will also check to see if you have protein in your urine. High blood pressure and protein in urine are signs of preeclampsia. This condition can be dangerous for you and your baby. · Drink plenty of fluids. Dehydration can cause contractions. If you have kidney, heart, or liver disease and have to limit fluids, talk with your doctor before you increase the amount of fluids you drink. · Tell your doctor right away if you notice any symptoms of an infection, such as:  ? Burning when you urinate. ? A foul-smelling discharge from your vagina. ? Vaginal itching. ? Unexplained fever. ? Unusual pain or soreness in your uterus or lower belly. · Eat a balanced diet. Include plenty of foods that are high in calcium and iron. ? Foods high in calcium include milk, cheese, yogurt, almonds, and broccoli. ? Foods high in iron include red meat, shellfish, poultry, eggs, beans, raisins, whole-grain bread, and leafy green vegetables. · Do not smoke. If you need help quitting, talk to your doctor about stop-smoking programs and medicines.  These can increase your chances of quitting for good. · Do not drink alcohol or use marijuana or illegal drugs. · Follow your doctor's directions about activity. Your doctor will let you know how much, if any, exercise you can do. · Ask your doctor if you can have sex. If you are at risk for early labor, your doctor may ask you to not have sex. · Take care to prevent falls. During pregnancy, your joints are loose, and your balance is off. Sports such as bicycling, skiing, or in-line skating can increase your risk of falling. And don't ride horses or motorcycles, dive, water ski, scuba dive, or parachute jump while you are pregnant. · Avoid getting very hot. Do not use saunas or hot tubs. Avoid staying out in the sun in hot weather for long periods. Take acetaminophen (Tylenol) to lower a high fever. · Do not take any over-the-counter or herbal medicines or supplements without talking to your doctor or pharmacist first.  When should you call for help? Call 911  anytime you think you may need emergency care. For example, call if:    · You passed out (lost consciousness).     · You have a seizure.     · You have severe vaginal bleeding.     · You have severe pain in your belly or pelvis.     · You have had fluid gushing or leaking from your vagina and you know or think the umbilical cord is bulging into your vagina. If this happens, immediately get down on your knees so your rear end (buttocks) is higher than your head. This will decrease the pressure on the cord until help arrives. Call your doctor now or seek immediate medical care if:    · You have signs of preeclampsia, such as:  ? Sudden swelling of your face, hands, or feet. ? New vision problems (such as dimness, blurring, or seeing spots). ? A severe headache.     · You have any vaginal bleeding.     · You have belly pain or cramping.     · You have a fever.     · You have had regular contractions (with or without pain) for an hour.  This means that you have 8 or more within 1 hour or 4 or more in 20 minutes after you change your position and drink fluids.     · You have a sudden release of fluid from your vagina.     · You have low back pain or pelvic pressure that does not go away.     · You notice that your baby has stopped moving or is moving much less than normal.   Watch closely for changes in your health, and be sure to contact your doctor if you have any problems. Where can you learn more? Go to http://www.hung.com/  Enter Y951 in the search box to learn more about \"Pregnancy Precautions: Care Instructions. \"  Current as of: June 16, 2021               Content Version: 13.0  © 4227-9616 [employer]. Care instructions adapted under license by [employer] (which disclaims liability or warranty for this information). If you have questions about a medical condition or this instruction, always ask your healthcare professional. [address] any warranty or liability for your use of this information.

## 2021-12-30 NOTE — PROGRESS NOTES
NST:    Baseline 130    Accelerations:Two 15x15 in 10 min window    Decelerations: None    Contractions: None    Variability: Moderate    RNST    This procedure was preformed under my supervision and interpreted by me.     Erik Favre M.D.

## 2021-12-30 NOTE — PROGRESS NOTES
1930:  Pt arrived from the ED with c/o vomiting and abd cramping. The ED resolved the vomiting but sent pt up for OB evaluation. Pt states that cramping is not longer happening. Pt denies leaking of fluid, vag bleeding, H/A, blurry vision, or RUQ pain. 2002: Pt places on the [facility] monitor. Dr. Ade Grimes aware of pt arrival.    2045: Dr Ade Grimes at the bedside, SVE done, cervix long and closed. Orders received to discharge pt home. 2124: Patient discharged home. Discharge instructions reviewed with patient. Patient verbalized understanding. Pt to followup next week with OBGYN.

## 2022-01-17 LAB — GRBS, EXTERNAL: NEGATIVE

## 2022-01-31 ENCOUNTER — HOSPITAL ENCOUNTER (EMERGENCY)
Age: 27
Discharge: HOME | End: 2022-01-31
Attending: STUDENT IN AN ORGANIZED HEALTH CARE EDUCATION/TRAINING PROGRAM
Payer: COMMERCIAL

## 2022-01-31 VITALS
BODY MASS INDEX: 47.2 KG/M2 | HEIGHT: 64 IN | TEMPERATURE: 98.1 F | SYSTOLIC BLOOD PRESSURE: 120 MMHG | HEART RATE: 106 BPM | OXYGEN SATURATION: 100 % | RESPIRATION RATE: 18 BRPM | WEIGHT: 276.46 LBS | DIASTOLIC BLOOD PRESSURE: 70 MMHG

## 2022-01-31 DIAGNOSIS — Z20.822 PERSON UNDER INVESTIGATION FOR COVID-19: Primary | ICD-10-CM

## 2022-01-31 LAB — SARS-COV-2, COV2: NORMAL

## 2022-01-31 PROCEDURE — 99282 EMERGENCY DEPT VISIT SF MDM: CPT

## 2022-01-31 PROCEDURE — U0005 INFEC AGEN DETEC AMPLI PROBE: HCPCS

## 2022-01-31 NOTE — ED NOTES
1708: I have reviewed discharge instructions with the patient. The patient verbalized understanding. Patient ambulatory out of ED at this time.

## 2022-01-31 NOTE — ED PROVIDER NOTES
EMERGENCY DEPARTMENT HISTORY AND PHYSICAL EXAM      Date: 1/31/2022  Patient Name: Cesia Valdivia    History of Presenting Illness     Chief Complaint   Patient presents with    Covid Testing     Reports household member tested positive for covid 19 today, CC body aches and nausea. Pt wants to be tested because she is 38 weeks pregnant. Denied any SOB. History Provided By: Patient    HPI: Cesia Valdivia, 27 y.o. female with no significant past medical history, presents to the ED with cc of concern for COVID-19. The patient's brother tested positive for COVID-19. Last night, the patient began having nausea, chills, and body aches. Symptoms have gradually worsened. She is not immunized against COVID-19. She is currently 38 weeks pregnant and scheduled for an induction in 1 week due to high risk pregnancy. She had some lower back cramping last night but denies any currently. She denies any vaginal bleeding or discharge. She reports normal fetal movement. She denies fever, chest pain, shortness of breath, vomiting, diarrhea. There are no other complaints, changes, or physical findings at this time. PCP: Jovana Lin MD    No current facility-administered medications on file prior to encounter. Current Outpatient Medications on File Prior to Encounter   Medication Sig Dispense Refill    ondansetron (ZOFRAN ODT) 4 mg disintegrating tablet Take 1 Tablet by mouth every eight (8) hours as needed for Nausea or Vomiting. 20 Tablet 0    ibuprofen (MOTRIN) 600 mg tablet Take 1 Tab by mouth every six (6) hours as needed for Pain. (Patient not taking: Reported on 11/16/2021) 20 Tab 0    aluminum-magnesium hydroxide 200-200 mg/5 mL susp 5 mL, diphenhydrAMINE 12.5 mg/5 mL liqd 12.5 mg, lidocaine 2 % soln 5 mL 5 mL by Swish and Spit route two (2) times a day.  Magic mouth wash   Maalox  Lidocaine 2% viscous   Diphenhydramine oral solution     Pharmacy to mix equal portions of ingredients to a total volume as indicated in the dispense amount. (Patient not taking: Reported on 11/16/2021) 90 mL 0    PNV no.106-iron-folate no6-dha 27.5 mg iron- 1 mg cap Take 1 Tab by mouth daily. Past History     Past Medical History:  Past Medical History:   Diagnosis Date    Abdominal pain, other specified site     Keratosis pilaris 10/1/2009    Otitis media        Past Surgical History:  Past Surgical History:   Procedure Laterality Date    COLONOSCOPY N/A 9/17/2020    COLONOSCOPY[facility]EGD [facility] Maximino Gibbons [facility] END[facility]COPY    HX ADENOIDECTOMY      HX GYN      HX HEENT      tubal/tonsils and adnoids    HX OTHER SURGICAL      tonsilectomy childhood    HX OTHER SURGICAL      left ovary cyst removal     HX TONSILLECTOMY      HX TYMPANOSTOMY         Family History:  Family History   Problem Relation Age of Onset    Asthma Mother     Stroke Father     No Known Problems Sister     Asthma Brother     No Known Problems Brother     No Known Problems Sister        Social History:  Social History     Tobacco Use    Smoking status: Never Smoker    Smokeless tobacco: Never Used   Substance Use Topics    Alcohol use: No    Drug use: No       Allergies:  No Known Allergies      Review of Systems   Review of Systems   Constitutional: Positive for chills. Negative for fever. HENT: Negative for ear pain and sore throat. Eyes: Negative for redness and visual disturbance. Respiratory: Negative for cough and shortness of breath. Cardiovascular: Negative for chest pain and palpitations. Gastrointestinal: Positive for nausea. Negative for abdominal pain and vomiting. Genitourinary: Negative for dysuria and hematuria. Musculoskeletal: Positive for myalgias. Negative for back pain and gait problem. Skin: Negative for rash and wound. Neurological: Negative for dizziness and headaches. Psychiatric/Behavioral: Negative for behavioral problems and confusion.    All other systems reviewed and are negative. Physical Exam   Physical Exam  Constitutional:       Appearance: She is obese. She is not toxic-appearing. HENT:      Head: Normocephalic and atraumatic. Mouth/Throat:      Mouth: Mucous membranes are moist.   Eyes:      Extraocular Movements: Extraocular movements intact. Pupils: Pupils are equal, round, and reactive to light. Cardiovascular:      Rate and Rhythm: Normal rate and regular rhythm. Pulmonary:      Effort: Pulmonary effort is normal. No respiratory distress. Breath sounds: Normal breath sounds. No wheezing. Abdominal:      Comments: Abdomen is soft. No tenderness to palpation. Musculoskeletal:         General: No deformity. Normal range of motion. Cervical back: Normal range of motion and neck supple. Skin:     General: Skin is warm and dry. Neurological:      General: No focal deficit present. Mental Status: She is alert and oriented to person, place, and time. Psychiatric:         Behavior: Behavior normal.           Diagnostic Study Results     Labs -     Recent Results (from the past 12 hour(s))   SARS-COV-2    Collection Time: 01/31/22  4:05 PM   Result Value Ref Range    SARS-CoV-2 Nasopharyngeal         Radiologic Studies -   No orders to display     CT Results  (Last 48 hours)    None        CXR Results  (Last 48 hours)    None            Medical Decision Making   I am the first provider for this patient. I reviewed the vital signs, available nursing notes, past medical history, past surgical history, family history and social history. Vital Signs-Reviewed the patient's vital signs. Patient Vitals for the past 12 hrs:   Temp Pulse Resp BP SpO2   01/31/22 1602 98.1 °F (36.7 °C) (!) 106 18 120/70 100 %         Records Reviewed: Nursing Notes and Old Medical Records      Provider Notes (Medical Decision Making):   Patient presents with close exposure to COVID-19 with mild symptoms beginning last night.   She is 38 weeks pregnant but denies any pregnancy complications at this time. She is maintaining her oxygenation on room air and having no shortness of breath. COVID-19 swab was sent and is pending. Advised close follow-up with OB/GYN for further management. Discussed return precautions, including chest pain, shortness of breath, abdominal pain, vaginal bleeding or leakage. ED Course:   Initial assessment performed. The patients presenting problems have been discussed, and they are in agreement with the care plan formulated and outlined with them. I have encouraged them to ask questions as they arise throughout their visit. Disposition:  5:05 PM  The patient has been re-evaluated and is ready for discharge. Reviewed available results with patient. Counseled patient on diagnosis and care plan. Patient has expressed understanding, and all questions have been answered. Patient agrees with plan and agrees to follow up as recommended, or to return to the ED if their symptoms worsen. Discharge instructions have been provided and explained to the patient, along with reasons to return to the ED. PLAN:  1. Discharge Medication List as of 1/31/2022  5:05 PM        2. Follow-up Information     Follow up With Specialties Details Why Contact Info    Drea Bowman MD Obstetrics & Gynecology Schedule an appointment as soon as possible for a visit  and for further discussion of treatment of COVID in pregnancy [address]   [facility]  [phone]          Return to ED if worse     Diagnosis     Clinical Impression:   1. Person under investigation for COVID-19            Morgan Hunter.  ALEX Smith

## 2022-02-01 ENCOUNTER — HOSPITAL ENCOUNTER (INPATIENT)
Age: 27
LOS: 3 days | Discharge: HOME | DRG: 560 | End: 2022-02-04
Attending: OBSTETRICS & GYNECOLOGY | Admitting: OBSTETRICS & GYNECOLOGY
Payer: COMMERCIAL

## 2022-02-01 ENCOUNTER — ANESTHESIA EVENT (OUTPATIENT)
Dept: LABOR AND DELIVERY | Age: 27
DRG: 560 | End: 2022-02-01
Payer: COMMERCIAL

## 2022-02-01 ENCOUNTER — ANESTHESIA (OUTPATIENT)
Dept: LABOR AND DELIVERY | Age: 27
DRG: 560 | End: 2022-02-01
Payer: COMMERCIAL

## 2022-02-01 PROBLEM — O41.00X0 OLIGOHYDRAMNIOS: Status: ACTIVE | Noted: 2022-02-01

## 2022-02-01 LAB
ABO + RH BLD: NORMAL
AMPHET UR QL SCN: NEGATIVE
BARBITURATES UR QL SCN: NEGATIVE
BASOPHILS # BLD: 0 K/UL (ref 0–0.1)
BASOPHILS NFR BLD: 0 % (ref 0–1)
BENZODIAZ UR QL: NEGATIVE
BLOOD GROUP ANTIBODIES SERPL: NORMAL
CANNABINOIDS UR QL SCN: NEGATIVE
COCAINE UR QL SCN: NEGATIVE
DIFFERENTIAL METHOD BLD: ABNORMAL
DRUG SCRN COMMENT,DRGCM: NORMAL
EOSINOPHIL # BLD: 0 K/UL (ref 0–0.4)
EOSINOPHIL NFR BLD: 0 % (ref 0–7)
ERYTHROCYTE [DISTWIDTH] IN BLOOD BY AUTOMATED COUNT: 13.9 % (ref 11.5–14.5)
HCT VFR BLD AUTO: 32.5 % (ref 35–47)
HGB BLD-MCNC: 10.5 G/DL (ref 11.5–16)
IMM GRANULOCYTES # BLD AUTO: 0 K/UL (ref 0–0.04)
IMM GRANULOCYTES NFR BLD AUTO: 0 % (ref 0–0.5)
LYMPHOCYTES # BLD: 1.4 K/UL (ref 0.8–3.5)
LYMPHOCYTES NFR BLD: 23 % (ref 12–49)
MCH RBC QN AUTO: 26.3 PG (ref 26–34)
MCHC RBC AUTO-ENTMCNC: 32.3 G/DL (ref 30–36.5)
MCV RBC AUTO: 81.3 FL (ref 80–99)
METHADONE UR QL: NEGATIVE
MONOCYTES # BLD: 0.4 K/UL (ref 0–1)
MONOCYTES NFR BLD: 7 % (ref 5–13)
NEUTS SEG # BLD: 4.2 K/UL (ref 1.8–8)
NEUTS SEG NFR BLD: 70 % (ref 32–75)
NRBC # BLD: 0 K/UL (ref 0–0.01)
NRBC BLD-RTO: 0 PER 100 WBC
OPIATES UR QL: NEGATIVE
PCP UR QL: NEGATIVE
PLATELET # BLD AUTO: 343 K/UL (ref 150–400)
PMV BLD AUTO: 10.1 FL (ref 8.9–12.9)
RBC # BLD AUTO: 4 M/UL (ref 3.8–5.2)
SARS-COV-2, XPLCVT: DETECTED
SOURCE, COVRS: ABNORMAL
SPECIMEN EXP DATE BLD: NORMAL
WBC # BLD AUTO: 6.1 K/UL (ref 3.6–11)

## 2022-02-01 PROCEDURE — 80307 DRUG TEST PRSMV CHEM ANLYZR: CPT

## 2022-02-01 PROCEDURE — 36415 COLL VENOUS BLD VENIPUNCTURE: CPT

## 2022-02-01 PROCEDURE — 65410000002 HC RM PRIVATE OB

## 2022-02-01 PROCEDURE — 75410000002 HC LABOR FEE PER 1 HR

## 2022-02-01 PROCEDURE — 85025 COMPLETE CBC W/AUTO DIFF WBC: CPT

## 2022-02-01 PROCEDURE — 77030014125 HC TY EPDRL BBMI -B: Performed by: ANESTHESIOLOGY

## 2022-02-01 PROCEDURE — 74011000250 HC RX REV CODE- 250: Performed by: ANESTHESIOLOGY

## 2022-02-01 PROCEDURE — 74011250636 HC RX REV CODE- 250/636: Performed by: OBSTETRICS & GYNECOLOGY

## 2022-02-01 PROCEDURE — 74011000250 HC RX REV CODE- 250

## 2022-02-01 PROCEDURE — 74011000250 HC RX REV CODE- 250: Performed by: OBSTETRICS & GYNECOLOGY

## 2022-02-01 PROCEDURE — 86900 BLOOD TYPING SEROLOGIC ABO: CPT

## 2022-02-01 PROCEDURE — 74011250636 HC RX REV CODE- 250/636: Performed by: ANESTHESIOLOGY

## 2022-02-01 PROCEDURE — 3E033VJ INTRODUCTION OF OTHER HORMONE INTO PERIPHERAL VEIN, PERCUTANEOUS APPROACH: ICD-10-PCS | Performed by: OBSTETRICS & GYNECOLOGY

## 2022-02-01 PROCEDURE — 4A1HXCZ MONITORING OF PRODUCTS OF CONCEPTION, CARDIAC RATE, EXTERNAL APPROACH: ICD-10-PCS | Performed by: OBSTETRICS & GYNECOLOGY

## 2022-02-01 RX ORDER — FENTANYL CITRATE 50 UG/ML
INJECTION, SOLUTION INTRAMUSCULAR; INTRAVENOUS
Status: COMPLETED
Start: 2022-02-01 | End: 2022-02-01

## 2022-02-01 RX ORDER — OXYTOCIN/RINGER'S LACTATE 30/500 ML
1-25 PLASTIC BAG, INJECTION (ML) INTRAVENOUS
Status: DISCONTINUED | OUTPATIENT
Start: 2022-02-02 | End: 2022-02-01

## 2022-02-01 RX ORDER — SODIUM CHLORIDE, SODIUM LACTATE, POTASSIUM CHLORIDE, CALCIUM CHLORIDE 600; 310; 30; 20 MG/100ML; MG/100ML; MG/100ML; MG/100ML
125 INJECTION, SOLUTION INTRAVENOUS CONTINUOUS
Status: DISCONTINUED | OUTPATIENT
Start: 2022-02-02 | End: 2022-02-01

## 2022-02-01 RX ORDER — EPHEDRINE SULFATE/0.9% NACL/PF 50 MG/5 ML
10 SYRINGE (ML) INTRAVENOUS
Status: COMPLETED | OUTPATIENT
Start: 2022-02-02 | End: 2022-02-01

## 2022-02-01 RX ORDER — FENTANYL CITRATE 50 UG/ML
INJECTION, SOLUTION INTRAMUSCULAR; INTRAVENOUS AS NEEDED
Status: DISCONTINUED | OUTPATIENT
Start: 2022-02-01 | End: 2022-02-02 | Stop reason: HOSPADM

## 2022-02-01 RX ORDER — FENTANYL/BUPIVACAINE/NS/PF 2-1250MCG
1-16 PREFILLED PUMP RESERVOIR EPIDURAL CONTINUOUS
Status: DISCONTINUED | OUTPATIENT
Start: 2022-02-01 | End: 2022-02-04 | Stop reason: HOSPADM

## 2022-02-01 RX ORDER — OXYTOCIN/RINGER'S LACTATE 30/500 ML
10 PLASTIC BAG, INJECTION (ML) INTRAVENOUS AS NEEDED
Status: COMPLETED | OUTPATIENT
Start: 2022-02-01 | End: 2022-02-02

## 2022-02-01 RX ORDER — OXYTOCIN/RINGER'S LACTATE 30/500 ML
1-25 PLASTIC BAG, INJECTION (ML) INTRAVENOUS
Status: DISCONTINUED | OUTPATIENT
Start: 2022-02-01 | End: 2022-02-04 | Stop reason: HOSPADM

## 2022-02-01 RX ORDER — SODIUM CHLORIDE 0.9 % (FLUSH) 0.9 %
5-40 SYRINGE (ML) INJECTION AS NEEDED
Status: DISCONTINUED | OUTPATIENT
Start: 2022-02-01 | End: 2022-02-04 | Stop reason: HOSPADM

## 2022-02-01 RX ORDER — SODIUM CHLORIDE, SODIUM LACTATE, POTASSIUM CHLORIDE, CALCIUM CHLORIDE 600; 310; 30; 20 MG/100ML; MG/100ML; MG/100ML; MG/100ML
125 INJECTION, SOLUTION INTRAVENOUS CONTINUOUS
Status: DISCONTINUED | OUTPATIENT
Start: 2022-02-01 | End: 2022-02-04 | Stop reason: HOSPADM

## 2022-02-01 RX ORDER — FENTANYL/BUPIVACAINE/NS/PF 2-1250MCG
PREFILLED PUMP RESERVOIR EPIDURAL
Status: COMPLETED
Start: 2022-02-01 | End: 2022-02-01

## 2022-02-01 RX ORDER — SODIUM CHLORIDE 0.9 % (FLUSH) 0.9 %
5-40 SYRINGE (ML) INJECTION EVERY 8 HOURS
Status: DISCONTINUED | OUTPATIENT
Start: 2022-02-01 | End: 2022-02-04 | Stop reason: HOSPADM

## 2022-02-01 RX ORDER — BUPIVACAINE HYDROCHLORIDE AND EPINEPHRINE 2.5; 5 MG/ML; UG/ML
INJECTION, SOLUTION EPIDURAL; INFILTRATION; INTRACAUDAL; PERINEURAL AS NEEDED
Status: DISCONTINUED | OUTPATIENT
Start: 2022-02-01 | End: 2022-02-02 | Stop reason: HOSPADM

## 2022-02-01 RX ORDER — BUPIVACAINE HYDROCHLORIDE AND EPINEPHRINE 2.5; 5 MG/ML; UG/ML
INJECTION, SOLUTION EPIDURAL; INFILTRATION; INTRACAUDAL; PERINEURAL
Status: COMPLETED
Start: 2022-02-01 | End: 2022-02-01

## 2022-02-01 RX ORDER — NALOXONE HYDROCHLORIDE 0.4 MG/ML
0.4 INJECTION, SOLUTION INTRAMUSCULAR; INTRAVENOUS; SUBCUTANEOUS AS NEEDED
Status: DISCONTINUED | OUTPATIENT
Start: 2022-02-01 | End: 2022-02-04 | Stop reason: HOSPADM

## 2022-02-01 RX ORDER — OXYTOCIN/RINGER'S LACTATE 30/500 ML
87.3 PLASTIC BAG, INJECTION (ML) INTRAVENOUS AS NEEDED
Status: DISCONTINUED | OUTPATIENT
Start: 2022-02-01 | End: 2022-02-04 | Stop reason: HOSPADM

## 2022-02-01 RX ADMIN — Medication 10 MG: at 23:10

## 2022-02-01 RX ADMIN — SODIUM CHLORIDE, PRESERVATIVE FREE 10 ML: 5 INJECTION INTRAVENOUS at 17:08

## 2022-02-01 RX ADMIN — OXYTOCIN 2 MILLI-UNITS/MIN: 10 INJECTION INTRAVENOUS at 17:08

## 2022-02-01 RX ADMIN — BUPIVACAINE HYDROCHLORIDE AND EPINEPHRINE 0.5 ML: 2.5; 5 INJECTION, SOLUTION EPIDURAL; INFILTRATION; INTRACAUDAL; PERINEURAL at 20:41

## 2022-02-01 RX ADMIN — Medication 12 ML/HR: at 20:55

## 2022-02-01 RX ADMIN — FENTANYL CITRATE 100 MCG: 50 INJECTION, SOLUTION INTRAMUSCULAR; INTRAVENOUS at 20:43

## 2022-02-01 RX ADMIN — SODIUM CHLORIDE, POTASSIUM CHLORIDE, SODIUM LACTATE AND CALCIUM CHLORIDE 125 ML/HR: 600; 310; 30; 20 INJECTION, SOLUTION INTRAVENOUS at 17:08

## 2022-02-01 RX ADMIN — BUPIVACAINE HYDROCHLORIDE AND EPINEPHRINE 3 ML: 2.5; 5 INJECTION, SOLUTION EPIDURAL; INFILTRATION; INTRACAUDAL; PERINEURAL at 20:42

## 2022-02-01 RX ADMIN — BUPIVACAINE HYDROCHLORIDE AND EPINEPHRINE 3 ML: 2.5; 5 INJECTION, SOLUTION EPIDURAL; INFILTRATION; INTRACAUDAL; PERINEURAL at 20:43

## 2022-02-01 RX ADMIN — SODIUM CHLORIDE, POTASSIUM CHLORIDE, SODIUM LACTATE AND CALCIUM CHLORIDE 999 ML/HR: 600; 310; 30; 20 INJECTION, SOLUTION INTRAVENOUS at 20:10

## 2022-02-01 RX ADMIN — SODIUM CHLORIDE, POTASSIUM CHLORIDE, SODIUM LACTATE AND CALCIUM CHLORIDE 125 ML/HR: 600; 310; 30; 20 INJECTION, SOLUTION INTRAVENOUS at 23:32

## 2022-02-01 NOTE — PROGRESS NOTES
1340: Pt arrrived on unit for IOL. Pt is L1, 38 weeks 1 day. Pt denies bleeding, HA, blurry vision, severe RUQ pain, LOF. Pt reports positive fetal movement. 1427:  by Dr. Breonna Monet pt 3cm. Pt ok for NST, then a meal and start Pitocin this afternoon. 191: Bedside shift change report given to LUCIANO Garcia RN (oncoming nurse) by RAMIRO Hutchinson RN (offgoing nurse). Report included the following information SBAR and Kardex.

## 2022-02-01 NOTE — H&P
History & Physical    Name: Christine Viramontes MRN: [id number]  SSN: [id number]    YOB: 1995  Age: 27 y.o. Sex: female      Subjective:     Estimated Date of Delivery: 22  OB History    Para Term  AB Living   3 2 1 1   1   SAB IAB Ectopic Molar Multiple Live Births             1      # Outcome Date GA Lbr Mike/2nd Weight Sex Delivery Anes PTL Lv   3 Current            2 Term 10/02/18 39w5d    Vag-Spont   MICKY   1          FD       Ms. Kimberly So is a 27 y.o.  at 38w1d, here for IOL due to low amniotic fluid and history of IUFD. Seen in the office today for weekly testing. BPP was 8/8 but CARLOS total 5.3 and MVP 2.3. Given history, Dr. May Leon recommended proceeding with delivery. Last growth is 47% at 36 weeks. GBS neg. Patient is COVID positive. Pregnancy c/b:   -Hx of IUFD at 27 weeks due to abruption  -Poly noted at 32 weeks, resolved.   -BMI 43    Past Medical History:   Diagnosis Date    Abdominal pain, other specified site     Keratosis pilaris 10/1/2009    Otitis media      Past Surgical History:   Procedure Laterality Date    COLONOSCOPY N/A 2020    COL[facility]OS[facility] performed by [facility] at [facility] ENDOSCOPY    HX ADENOIDECTOMY      HX GYN      HX HEENT      tubal/tonsils and adnoids    HX OTHER SURGICAL      tonsilectomy childhood    HX OTHER SURGICAL      left ovary cyst removal     HX TONSILLECTOMY      HX TYMPANOSTOMY       Social History     Occupational History    Not on file   Tobacco Use    Smoking status: Never Smoker    Smokeless tobacco: Never Used   Vaping Use    Vaping Use: Never used   Substance and Sexual Activity    Alcohol use: Not Currently    Drug use: No    Sexual activity: Never     Family History   Problem Relation Age of Onset    Asthma Mother     Stroke Father     No Known Problems Sister     Asthma Brother     No Known Problems Brother     No Known Problems Sister        No Known Allergies  Prior to Admission medications    Medication Sig Start Date End Date Taking? Authorizing Provider   PNV no.106-iron-folate no6-dha 27.5 mg iron- 1 mg cap Take 1 Tab by mouth daily. Yes Other, MD Ansley   ondansetron (ZOFRAN ODT) 4 mg disintegrating tablet Take 1 Tablet by mouth every eight (8) hours as needed for Nausea or Vomiting. Patient not taking: Reported on 2/1/2022 12/29/21   REBECCA Cardozo   ibuprofen (MOTRIN) 600 mg tablet Take 1 Tab by mouth every six (6) hours as needed for Pain. Patient not taking: Reported on 11/16/2021 5/13/19   REBECCA Cardozo   aluminum-magnesium hydroxide 200-200 mg/5 mL susp 5 mL, diphenhydrAMINE 12.5 mg/5 mL liqd 12.5 mg, lidocaine 2 % soln 5 mL 5 mL by Swish and Spit route two (2) times a day. Magic mouth wash   Maalox  Lidocaine 2% viscous   Diphenhydramine oral solution     Pharmacy to mix equal portions of ingredients to a total volume as indicated in the dispense amount. Patient not taking: Reported on 11/16/2021 5/13/19   REBECCA Cardozo        Review of Systems: A comprehensive review of systems was negative except for that written in the History of Present Illness. Objective:     Vitals:  Vitals:    02/01/22 1416   Weight: 125.2 kg (276 lb)   Height: 5' 4\" (1.626 m)        Physical Exam:  Patient without distress. Heart: Regular rate and rhythm  Lung: normal respiratory effort  Abdomen: soft, nontender  Fundus: soft and non tender  Perineum: blood absent, amniotic fluid absent  Cervical Exam: 3/50/-3  Lower Extremities:  - Edema No  Membranes:  Intact  Fetal Heart Rate: Reactive          Prenatal Labs:   No results found for: ABORH, RUBELLAEXT, HBSAGEXT, HIVEXT, RPREXT, GONNOEXT, CHLAMEXT, ABORHEXT, RUBELLAEXT, GRBSEXT, HBSAGEXT, HIVEXT, RPREXT, GONNOEXT, CHLAMEXT, ABORHEXT, RUBELLAEXT, GRBSEXT, HBSAGEXT, HIVEXT, RPREXT, GONNOEXT, CHLAMEXT     No results found for this or any previous visit (from the past 12 hour(s)).     Impression/Plan:     Active Problems:    Oligohydramnios (2022)       27 y.o.  at 38w1d, here for IOL for low amniotic fluid and history of IUFD. Plan: Admit for induction of labor. Will start pitocin and plan AROM when fetal head well-applied. Continuous monitoring. Group B Strep negative.

## 2022-02-02 PROCEDURE — 65410000002 HC RM PRIVATE OB

## 2022-02-02 PROCEDURE — 76060000078 HC EPIDURAL ANESTHESIA

## 2022-02-02 PROCEDURE — 74011250636 HC RX REV CODE- 250/636: Performed by: OBSTETRICS & GYNECOLOGY

## 2022-02-02 PROCEDURE — 10907ZC DRAINAGE OF AMNIOTIC FLUID, THERAPEUTIC FROM PRODUCTS OF CONCEPTION, VIA NATURAL OR ARTIFICIAL OPENING: ICD-10-PCS | Performed by: OBSTETRICS & GYNECOLOGY

## 2022-02-02 PROCEDURE — 75410000002 HC LABOR FEE PER 1 HR

## 2022-02-02 PROCEDURE — 74011000250 HC RX REV CODE- 250: Performed by: ANESTHESIOLOGY

## 2022-02-02 PROCEDURE — 74011250637 HC RX REV CODE- 250/637: Performed by: OBSTETRICS & GYNECOLOGY

## 2022-02-02 PROCEDURE — 75410000003 HC RECOV DEL/VAG/CSECN EA 0.5 HR

## 2022-02-02 PROCEDURE — 75410000000 HC DELIVERY VAGINAL/SINGLE

## 2022-02-02 RX ORDER — DIPHENHYDRAMINE HCL 25 MG
25 CAPSULE ORAL
Status: DISCONTINUED | OUTPATIENT
Start: 2022-02-02 | End: 2022-02-04 | Stop reason: HOSPADM

## 2022-02-02 RX ORDER — OXYTOCIN/RINGER'S LACTATE 30/500 ML
10 PLASTIC BAG, INJECTION (ML) INTRAVENOUS AS NEEDED
Status: DISCONTINUED | OUTPATIENT
Start: 2022-02-02 | End: 2022-02-04 | Stop reason: HOSPADM

## 2022-02-02 RX ORDER — OXYCODONE AND ACETAMINOPHEN 5; 325 MG/1; MG/1
2 TABLET ORAL
Status: DISCONTINUED | OUTPATIENT
Start: 2022-02-02 | End: 2022-02-04 | Stop reason: HOSPADM

## 2022-02-02 RX ORDER — HYDROCORTISONE ACETATE PRAMOXINE HCL 2.5; 1 G/100G; G/100G
CREAM TOPICAL AS NEEDED
Status: DISCONTINUED | OUTPATIENT
Start: 2022-02-02 | End: 2022-02-04 | Stop reason: HOSPADM

## 2022-02-02 RX ORDER — EPHEDRINE SULFATE/0.9% NACL/PF 50 MG/5 ML
SYRINGE (ML) INTRAVENOUS
Status: DISPENSED
Start: 2022-02-02 | End: 2022-02-02

## 2022-02-02 RX ORDER — ZOLPIDEM TARTRATE 5 MG/1
5 TABLET ORAL
Status: DISCONTINUED | OUTPATIENT
Start: 2022-02-02 | End: 2022-02-04 | Stop reason: HOSPADM

## 2022-02-02 RX ORDER — ONDANSETRON 4 MG/1
4 TABLET, ORALLY DISINTEGRATING ORAL
Status: ACTIVE | OUTPATIENT
Start: 2022-02-02 | End: 2022-02-03

## 2022-02-02 RX ORDER — ACETAMINOPHEN 325 MG/1
650 TABLET ORAL
Status: DISCONTINUED | OUTPATIENT
Start: 2022-02-02 | End: 2022-02-04 | Stop reason: HOSPADM

## 2022-02-02 RX ORDER — ONDANSETRON 2 MG/ML
4 INJECTION INTRAMUSCULAR; INTRAVENOUS
Status: DISCONTINUED | OUTPATIENT
Start: 2022-02-02 | End: 2022-02-04 | Stop reason: HOSPADM

## 2022-02-02 RX ORDER — SIMETHICONE 80 MG
80 TABLET,CHEWABLE ORAL
Status: DISCONTINUED | OUTPATIENT
Start: 2022-02-02 | End: 2022-02-04 | Stop reason: HOSPADM

## 2022-02-02 RX ORDER — IBUPROFEN 400 MG/1
800 TABLET ORAL EVERY 8 HOURS
Status: DISCONTINUED | OUTPATIENT
Start: 2022-02-02 | End: 2022-02-04 | Stop reason: HOSPADM

## 2022-02-02 RX ORDER — OXYTOCIN/RINGER'S LACTATE 30/500 ML
87.3 PLASTIC BAG, INJECTION (ML) INTRAVENOUS AS NEEDED
Status: DISCONTINUED | OUTPATIENT
Start: 2022-02-02 | End: 2022-02-04 | Stop reason: HOSPADM

## 2022-02-02 RX ORDER — ADHESIVE BANDAGE
30 BANDAGE TOPICAL DAILY PRN
Status: DISCONTINUED | OUTPATIENT
Start: 2022-02-02 | End: 2022-02-04 | Stop reason: HOSPADM

## 2022-02-02 RX ORDER — NALOXONE HYDROCHLORIDE 0.4 MG/ML
0.4 INJECTION, SOLUTION INTRAMUSCULAR; INTRAVENOUS; SUBCUTANEOUS AS NEEDED
Status: DISCONTINUED | OUTPATIENT
Start: 2022-02-02 | End: 2022-02-04 | Stop reason: HOSPADM

## 2022-02-02 RX ADMIN — OXYTOCIN 10000 MILLI-UNITS: 10 INJECTION INTRAVENOUS at 06:26

## 2022-02-02 RX ADMIN — Medication 12 ML/HR: at 04:41

## 2022-02-02 RX ADMIN — IBUPROFEN 800 MG: 400 TABLET, FILM COATED ORAL at 07:28

## 2022-02-02 RX ADMIN — IBUPROFEN 800 MG: 400 TABLET, FILM COATED ORAL at 18:45

## 2022-02-02 RX ADMIN — SODIUM CHLORIDE, POTASSIUM CHLORIDE, SODIUM LACTATE AND CALCIUM CHLORIDE 125 ML/HR: 600; 310; 30; 20 INJECTION, SOLUTION INTRAVENOUS at 03:25

## 2022-02-02 RX ADMIN — ONDANSETRON 4 MG: 2 INJECTION INTRAMUSCULAR; INTRAVENOUS at 01:44

## 2022-02-02 NOTE — PROGRESS NOTES
Pt assisted OOB, gait steady. Pt voided, epidural cath removed, benita care complete. Pt assisted back to bed without difficulty.

## 2022-02-02 NOTE — ANESTHESIA POSTPROCEDURE EVALUATION
* No procedures listed *. CSE    Anesthesia Post Evaluation      Multimodal analgesia: multimodal analgesia used between 6 hours prior to anesthesia start to PACU discharge  Patient location during evaluation: PACU  Patient participation: complete - patient participated  Level of consciousness: sleepy but conscious and responsive to verbal stimuli  Pain score: 2  Pain management: adequate  Airway patency: patent  Anesthetic complications: no  Cardiovascular status: acceptable  Respiratory status: acceptable  Hydration status: acceptable  Comments: +Post-Anesthesia Evaluation and Assessment    Patient: Maile Grant MRN: [id number]  SSN: [id number]   YOB: 1995  Age: 27 y.o. Sex: female      Cardiovascular Function/Vital Signs    BP (!) 97/59   Pulse 72   Temp 36.7 °C (98.1 °F)   Resp 18   Ht 5' 4\" (1.626 m)   Wt 125.2 kg (276 lb)   SpO2 98%   Breastfeeding Unknown   BMI 47.38 kg/m²     Patient is status post * No procedures listed *. Nausea/Vomiting: Controlled. Postoperative hydration reviewed and adequate. Pain:  Pain Scale 1: Numeric (0 - 10) (02/02/22 0259)  Pain Intensity 1: 0 (02/02/22 0259)   Managed. Neurological Status:   Neuro (WDL): Within Defined Limits (02/01/22 1421)   At baseline. Mental Status and Level of Consciousness: Arousable. Pulmonary Status:   O2 Device: None (Room air) (02/01/22 1921)   Adequate oxygenation and airway patent. Complications related to anesthesia: None    Post-anesthesia assessment completed. No concerns. Signed By: Celia Trevizo MD    2/2/2022  Post anesthesia nausea and vomiting:  controlled  Final Post Anesthesia Temperature Assessment:  Normothermia (36.0-37.5 degrees C)      INITIAL Post-op Vital signs:   Vitals Value Taken Time   BP     Temp     Pulse     Resp     SpO2 98 % 02/02/22 0708   Vitals shown include unvalidated device data.

## 2022-02-02 NOTE — PROGRESS NOTES
1915:  Report from RAMIRO Carrillo RN. Assuming care of pt.    1953:  Pt feeling uc's mildly but would like to prep for epidural.  Bolus started. Dr Rylee Perez aware, he will check cervix after epidural placement. 2039:  1000 cc fluid bolus infused. Dr Nickie Lopez at  for epidural.    2219:  Difficulty tracing uc's due to maternal habitus. Internals placed by Dr Rylee Perez. 0236:  Spoke with dr Rylee Perez face to face. Informed him that Pitocin stopped while he was in surgery due to deep variables. Strip reviewed. Agrees to keep Pitocin off at this time. 9118:  SBAR report to SARAH Eid RN and LUCIANO Vences RNC. Care turned over.

## 2022-02-02 NOTE — L&D DELIVERY NOTE
Delivery Summary  Patient: Chandana Chow             Additional Delivery Comments - Patient was admitted the day before to delivery for IOL. On admission, she was 3cm/-3 station. Pitocin was begun, and after epidural placement, there was progress to about 4cm, at which time AROM was completed with return of clear fluid. Fetal heart tones were Cat 1 and Cat 2 throughout the course of labor. The patient then progressed through the first stage, then progressed through the second stage to a vaginal delivery. When the fetal vertex reached +3 station in the LOP position, no further progress was achieved. The option of vacuum assist was discussed with the patient and she indicated she wished to proceed. The patient was prepared for delivery. A KIWI was applied without difficulty. Over 4 contractions, with one pop-off, the baby was delivered without difficulty over intact perineum. He was bulb suctioned, became active and crying, and was placed on the maternal abdomen. The cord was clamped and cut, and then the placenta delivered spontaneously, intact. The fundus became firm, the cervix and sulci were inspected and appeared to be intact. Vaginal exam revealed no evidence of laceration, hematoma formation or foreign bodies. Sponge and sharps count was correct. The procedure was tolerated adequately by the patient and she is recovering in stable condition. Information for the patient's :  Chela Shaw [[id number]]     Delivery Type: Vaginal, Spontaneous   Delivery Date: 2022   Delivery Time: 6:22 AM     Birth Weight: 2.93 kg     Sex:  male  Delivery Clinician:  Juan Ramon Sweeney   Gestational Age: 36w4d    Presentation: Vertex   Position: Left  Occiput  Posterior     Apgars were 8  and 9      Resuscitation Method: Suctioning-bulb; Tactile Stimulation     Meconium Stained: None    Living Status: Living       Placenta Date/Time: 2022  6:26 AM   Placenta Removal: Spontaneous   Placenta Appearance: Intact    Cord Information: 3 Vessels    Cord Events: None       Disposition of Cord Blood: Discard    Blood Gases Sent?:  No     Episiotomy: None   Laceration(s): None     Estimated Blood Loss (ml): No data found    Labor Events  Method: Oxytocin      Augmentation: Oxytocin   Cervical Ripening:     None

## 2022-02-02 NOTE — PROGRESS NOTES
Labor Progress Note  Patient seen, fetal heart rate and contraction pattern evaluated. Patient reports she is comfortable with epidural in place. Physical Exam:  Visit Vitals  BP (!) 118/58   Pulse 61   Temp 98.3 °F (36.8 °C)   Resp 16   Ht 5' 4\" (1.626 m)   Wt 125.2 kg (276 lb)   LMP  (Exact Date)   SpO2 99%   BMI 47.38 kg/m²     Cervical Exam: 4-5/60/-2  Membranes:  intact  Uterine Activity: Frequency: 2-3 minutes  Fetal Heart Rate: 140,  adequate variability and reactivity  Accelerations: yes  Decelerations: none  Pitocin at 8 miu/min    Assessment/Plan:  Reassuring fetal status. Progressing adequately  AROM clear fluid  Continue titration pitocin    CORTES Glover MD

## 2022-02-02 NOTE — PROGRESS NOTES
Bedside shift change report given to SARAH Juares, RN and LUCIANO Vences RN (oncoming nurse) by LUCIANO Henderson RN (offgoing nurse). Report included the following information SBAR and Kardex. Bedside shift change report given to MARGO Gamboa (oncoming nurse) by SARAH Juares RN and Ricky Vasquez RN (offgoing nurse). Report included the following information SBAR and Kardex.

## 2022-02-02 NOTE — ANESTHESIA PROCEDURE NOTES
CSE Block    Start time: 2/1/2022 8:36 PM  End time: 2/1/2022 8:45 PM  Performed by: Brianna Morgan MD  Authorized by: Brianna Morgan MD     Pre-Procedure  Indications: at surgeon's request and procedure for pain    preanesthetic checklist: patient identified, risks and benefits discussed, anesthesia consent, site marked, patient being monitored and timeout performed    Timeout Time: 20:36 EST        Procedure:   Patient Position:  Seated  Prep Region:  Lumbar  Prep: DuraPrep    Location:  L3-4    Epidural Needle:   Needle Type:  Tuohy  Needle Gauge:  17 G  Injection Technique:  Loss of resistance using saline  Attempts:  1    Spinal Needle:   Needle Type:   Amina  Needle Gauge:  25 G    Catheter:   Catheter Type:  Flex-tip  Catheter Size:  19 G  Catheter at Skin Depth (cm):  11  Depth in Epidural Space (cm):  4  Events: no blood with aspiration, no cerebrospinal fluid with aspiration, no paresthesia and negative aspiration test    Test Dose:  Negative    Assessment:   Catheter Secured:  Tegaderm and tape  Insertion:  Uncomplicated  Patient tolerance:  Patient tolerated the procedure well with no immediate complications

## 2022-02-03 ENCOUNTER — HOSPITAL ENCOUNTER (INPATIENT)
Dept: PREADMISSION TESTING | Age: 27
Discharge: HOME | DRG: 560 | End: 2022-02-03
Payer: COMMERCIAL

## 2022-02-03 PROCEDURE — 74011250637 HC RX REV CODE- 250/637: Performed by: OBSTETRICS & GYNECOLOGY

## 2022-02-03 PROCEDURE — 65410000002 HC RM PRIVATE OB

## 2022-02-03 RX ADMIN — IBUPROFEN 800 MG: 400 TABLET, FILM COATED ORAL at 05:36

## 2022-02-03 RX ADMIN — IBUPROFEN 800 MG: 400 TABLET, FILM COATED ORAL at 13:29

## 2022-02-03 RX ADMIN — IBUPROFEN 800 MG: 400 TABLET, FILM COATED ORAL at 21:03

## 2022-02-03 NOTE — PROGRESS NOTES
Problem: Isolation Precautions - Risk of Spread of Infection  Goal: Prevent transmission of infectious organism to others  Outcome: Progressing Towards Goal     Problem: Nutrition Deficits  Goal: Optimize nutrtional status  Outcome: Progressing Towards Goal     Problem: Loneliness or Risk for Loneliness  Goal: Demonstrate positive use of time alone when socialization is not possible  Outcome: Progressing Towards Goal     Problem: Risk for Spread of Infection  Goal: Prevent transmission of infectious organism to others  Description: Prevent the transmission of infectious organisms to other patients, staff members, and visitors.   Outcome: Progressing Towards Goal     Problem: Vaginal Delivery: Postpartum 2  Goal: Activity/Safety  Outcome: Progressing Towards Goal  Goal: Nutrition/Diet  Outcome: Progressing Towards Goal  Goal: Discharge Planning  Outcome: Progressing Towards Goal  Goal: Treatments/Interventions/Procedures  Outcome: Progressing Towards Goal  Goal: Psychosocial  Outcome: Progressing Towards Goal

## 2022-02-03 NOTE — PROGRESS NOTES
Bedside and Verbal shift change report given to SARAH Bishop (oncoming nurse) by NOLVIA Jaimes and RAMIRO Mckeon (offgoing nurse). Report included the following information SBAR, Kardex and MAR.

## 2022-02-03 NOTE — ROUTINE PROCESS
Bedside shift change report given to RAMIRO March RN & NOLVIA Jaimes RN (oncoming nurse) by Jean-Paul Luevano. Lauryn Irving (offgoing nurse). Report included the following information SBAR, Intake/Output and MAR.

## 2022-02-03 NOTE — PROGRESS NOTES
Problem: Airway Clearance - Ineffective  Goal: Achieve or maintain patent airway  Outcome: Progressing Towards Goal     Problem: Vaginal Delivery: Postpartum Day 1  Goal: Off Pathway (Use only if patient is Off Pathway)  Outcome: Progressing Towards Goal  Goal: Activity/Safety  Outcome: Progressing Towards Goal  Goal: Consults, if ordered  Outcome: Progressing Towards Goal  Goal: Diagnostic Test/Procedures  Outcome: Progressing Towards Goal  Goal: Nutrition/Diet  Outcome: Progressing Towards Goal  Goal: Discharge Planning  Outcome: Progressing Towards Goal  Goal: Medications  Outcome: Progressing Towards Goal  Goal: Treatments/Interventions/Procedures  Outcome: Progressing Towards Goal  Goal: Psychosocial  Outcome: Progressing Towards Goal  Goal: *Vital signs within defined limits  Outcome: Progressing Towards Goal  Goal: *Labs within defined limits  Outcome: Progressing Towards Goal  Goal: *Hemodynamically stable  Outcome: Progressing Towards Goal

## 2022-02-03 NOTE — PROGRESS NOTES
Post-Partum Day Number 1 Progress Note    Richelle Velasquez     Assessment: Doing well, post partum day 1 s/p  p IOL for low CARLOS and hx IUFD. Plan:  - Continue routine postpartum and perineal care as well as maternal education.  - The risks and benefits of the circumcision  procedure and anesthesia including: bleeding, infection, variability of cosmetic results were discussed at length with the mother. She is aware that future repeat procedures may be necessary. She gives informed consent to proceed as noted and her questions are answered. Reviewed I do not perform circs in covid isolation as this is an elective procedure. Can reassess with new provider tomorrow, otherwise plan outpatient circ. - Plan discharge home [address] Discharge Date: Tomorrow. Information for the patient's :  Yohannes Salgado [[id number]]   Vaginal, Spontaneous    Patient doing well without significant complaint. Voiding without difficulty, normal lochia. Vitals:  Visit Vitals  /67 (BP 1 Location: Right upper arm, BP Patient Position: At rest)   Pulse 73   Temp 97.8 °F (36.6 °C)   Resp 16   Ht 5' 4\" (1.626 m)   Wt 125.2 kg (276 lb)   LMP  (Exact Date)   SpO2 99%   Breastfeeding Unknown   BMI 47.38 kg/m²     Temp (24hrs), Av.9 °F (36.6 °C), Min:97.7 °F (36.5 °C), Max:98.2 °F (36.8 °C)        Exam:   Patient without distress. Fundus firm, nontender per nursing fundal checks. Perineum with normal lochia noted per nursing assessment. Lower extremities are negative for pathological edema.     Labs:     Lab Results   Component Value Date/Time    WBC 6.1 2022 02:15 PM    WBC 11.9 (H) 2021 01:04 PM    WBC 11.0 2021 11:02 PM    WBC 8.4 2021 05:48 PM    WBC 12.5 (H) 2018 10:59 PM    WBC 9.6 2017 12:14 PM    WBC 10.9 2017 01:45 PM    WBC 11.1 (H) 2017 04:00 AM    WBC 18.5 (H) 2017 09:44 PM    WBC 9.8 2017 03:05 PM    WBC 14.0 (H) 07/17/2016 11:58 AM    WBC 14.0 (H) 06/13/2016 05:36 AM    WBC 15.9 (H) 06/12/2016 05:11 PM    WBC 12.7 (H) 08/05/2014 10:15 PM    WBC 10.5 (H) 06/20/2014 05:10 AM    WBC 11.4 (H) 05/12/2011 01:56 AM    HGB 10.5 (L) 02/01/2022 02:15 PM    HGB 11.1 (L) 12/29/2021 01:04 PM    HGB 10.5 (L) 11/16/2021 11:02 PM    HGB 11.6 06/17/2021 05:48 PM    HGB 11.5 03/14/2018 10:59 PM    HGB 9.2 (L) 04/12/2017 12:14 PM    HGB 8.6 (L) 04/05/2017 01:45 PM    HGB 8.0 (L) 04/05/2017 04:00 AM    HGB 10.8 (L) 04/03/2017 09:44 PM    HGB 10.2 (L) 02/26/2017 03:05 PM    HGB 12.0 07/17/2016 11:58 AM    HGB 11.9 06/13/2016 05:36 AM    HGB 11.9 06/12/2016 05:11 PM    HGB 12.4 08/05/2014 10:15 PM    HGB 12.2 06/20/2014 05:10 AM    HGB 12.6 05/12/2011 01:56 AM    HCT 32.5 (L) 02/01/2022 02:15 PM    HCT 34.4 (L) 12/29/2021 01:04 PM    HCT 31.8 (L) 11/16/2021 11:02 PM    HCT 36.4 06/17/2021 05:48 PM    HCT 37.0 03/14/2018 10:59 PM    HCT 30.2 (L) 04/12/2017 12:14 PM    HCT 27.9 (L) 04/05/2017 01:45 PM    HCT 25.7 (L) 04/05/2017 04:00 AM    HCT 33.6 (L) 04/03/2017 09:44 PM    HCT 32.4 (L) 02/26/2017 03:05 PM    HCT 35.4 07/17/2016 11:58 AM    HCT 34.7 (L) 06/13/2016 05:36 AM    HCT 35.0 06/12/2016 05:11 PM    HCT 36.9 08/05/2014 10:15 PM    HCT 36.2 06/20/2014 05:10 AM    HCT 37.5 05/12/2011 01:56 AM    PLATELET 308 41/45/1825 02:15 PM    PLATELET 126 83/21/6577 01:04 PM    PLATELET 461 45/65/6784 11:02 PM    PLATELET 710 04/24/1834 05:48 PM    PLATELET 939 (H) 73/98/7269 10:59 PM    PLATELET 544 (H) 24/67/5050 12:14 PM    PLATELET 543 48/79/3040 01:45 PM    PLATELET 211 55/26/0003 04:00 AM    PLATELET 946 (H) 62/21/6943 09:44 PM    PLATELET 937 (H) 04/56/1985 03:05 PM    PLATELET 732 06/22/9902 11:58 AM    PLATELET 059 80/54/2120 05:36 AM    PLATELET 047 39/04/9171 05:11 PM    PLATELET 291 (H) 14/29/4347 10:15 PM    PLATELET 914 30/73/5569 05:10 AM    PLATELET 167 42/16/4255 01:56 AM       No results found for this or any previous visit (from the past 24 hour(s)).

## 2022-02-04 VITALS
HEIGHT: 64 IN | OXYGEN SATURATION: 100 % | TEMPERATURE: 98.2 F | RESPIRATION RATE: 16 BRPM | SYSTOLIC BLOOD PRESSURE: 120 MMHG | WEIGHT: 276 LBS | BODY MASS INDEX: 47.12 KG/M2 | HEART RATE: 83 BPM | DIASTOLIC BLOOD PRESSURE: 59 MMHG

## 2022-02-04 PROCEDURE — 74011250637 HC RX REV CODE- 250/637: Performed by: OBSTETRICS & GYNECOLOGY

## 2022-02-04 RX ORDER — IBUPROFEN 800 MG/1
800 TABLET ORAL
Qty: 30 TABLET | Refills: 0 | OUTPATIENT
Start: 2022-02-04 | End: 2022-09-22

## 2022-02-04 RX ADMIN — IBUPROFEN 800 MG: 400 TABLET, FILM COATED ORAL at 06:27

## 2022-02-04 NOTE — PROGRESS NOTES
Post-Partum Day Number 2 Progress Note    Richelle Velasquez     Assessment: Doing well, post partum day 2    Plan:   - Discharge home today after circ. - Follow up in office in 6 week(s) with [facility].  - Pain medication prescription(s) sent. - Questions answered. The risks and benefits of the circumcision procedure and anesthesia including: bleeding, infection, variability of cosmetic results were discussed. Informed consent was obtained and a consent form was signed and witnessed. All questions were answered. Information for the patient's :  Christian Vega [[id number]]   Vaginal, Spontaneous    Patient doing well without significant complaint. Voiding without difficulty, normal lochia. Ready for discharge home. Vitals:  Visit Vitals  /69 (BP 1 Location: Right upper arm, BP Patient Position: At rest)   Pulse 62   Temp 98.4 °F (36.9 °C)   Resp 16   Ht 5' 4\" (1.626 m)   Wt 125.2 kg (276 lb)   LMP  (Exact Date)   SpO2 100%   Breastfeeding Unknown   BMI 47.38 kg/m²     Temp (24hrs), Av °F (36.7 °C), Min:97.8 °F (36.6 °C), Max:98.4 °F (36.9 °C)      Exam:        Patient without distress.                 Fundus firm, nontender per nursing fundal checks                Perineum with normal lochia noted per nursing assessment                Lower extremities are negative for pathological edema    Labs:     Lab Results   Component Value Date/Time    WBC 6.1 2022 02:15 PM    WBC 11.9 (H) 2021 01:04 PM    WBC 11.0 2021 11:02 PM    WBC 8.4 2021 05:48 PM    WBC 12.5 (H) 2018 10:59 PM    WBC 9.6 2017 12:14 PM    WBC 10.9 2017 01:45 PM    WBC 11.1 (H) 2017 04:00 AM    WBC 18.5 (H) 2017 09:44 PM    WBC 9.8 2017 03:05 PM    WBC 14.0 (H) 2016 11:58 AM    WBC 14.0 (H) 2016 05:36 AM    WBC 15.9 (H) 2016 05:11 PM    WBC 12.7 (H) 2014 10:15 PM    WBC 10.5 (H) 2014 05:10 AM    WBC 11.4 (H) 2011 01:56 AM HGB 10.5 (L) 02/01/2022 02:15 PM    HGB 11.1 (L) 12/29/2021 01:04 PM    HGB 10.5 (L) 11/16/2021 11:02 PM    HGB 11.6 06/17/2021 05:48 PM    HGB 11.5 03/14/2018 10:59 PM    HGB 9.2 (L) 04/12/2017 12:14 PM    HGB 8.6 (L) 04/05/2017 01:45 PM    HGB 8.0 (L) 04/05/2017 04:00 AM    HGB 10.8 (L) 04/03/2017 09:44 PM    HGB 10.2 (L) 02/26/2017 03:05 PM    HGB 12.0 07/17/2016 11:58 AM    HGB 11.9 06/13/2016 05:36 AM    HGB 11.9 06/12/2016 05:11 PM    HGB 12.4 08/05/2014 10:15 PM    HGB 12.2 06/20/2014 05:10 AM    HGB 12.6 05/12/2011 01:56 AM    HCT 32.5 (L) 02/01/2022 02:15 PM    HCT 34.4 (L) 12/29/2021 01:04 PM    HCT 31.8 (L) 11/16/2021 11:02 PM    HCT 36.4 06/17/2021 05:48 PM    HCT 37.0 03/14/2018 10:59 PM    HCT 30.2 (L) 04/12/2017 12:14 PM    HCT 27.9 (L) 04/05/2017 01:45 PM    HCT 25.7 (L) 04/05/2017 04:00 AM    HCT 33.6 (L) 04/03/2017 09:44 PM    HCT 32.4 (L) 02/26/2017 03:05 PM    HCT 35.4 07/17/2016 11:58 AM    HCT 34.7 (L) 06/13/2016 05:36 AM    HCT 35.0 06/12/2016 05:11 PM    HCT 36.9 08/05/2014 10:15 PM    HCT 36.2 06/20/2014 05:10 AM    HCT 37.5 05/12/2011 01:56 AM    PLATELET 023 60/52/7160 02:15 PM    PLATELET 060 48/85/4282 01:04 PM    PLATELET 121 24/04/0204 11:02 PM    PLATELET 535 20/22/1538 05:48 PM    PLATELET 821 (H) 18/19/7567 10:59 PM    PLATELET 026 (H) 00/01/2591 12:14 PM    PLATELET 577 74/50/1135 01:45 PM    PLATELET 957 01/38/3367 04:00 AM    PLATELET 315 (H) 30/86/2612 09:44 PM    PLATELET 295 (H) 22/11/9092 03:05 PM    PLATELET 757 80/67/8778 11:58 AM    PLATELET 867 12/33/3286 05:36 AM    PLATELET 305 56/67/8243 05:11 PM    PLATELET 770 (H) 26/02/4775 10:15 PM    PLATELET 205 88/45/0471 05:10 AM    PLATELET 948 30/59/3578 01:56 AM       No results found for this or any previous visit (from the past 24 hour(s)).

## 2022-02-04 NOTE — DISCHARGE SUMMARY
Obstetrical Discharge Summary     Name: Renata Read MRN: [id number]  SSN: [id number]    YOB: 1995  Age: 27 y.o. Sex: female      Admit Date: 2022    Discharge Date: 2022     Admitting Physician: Silvia Wadsworth MD     Attending Physician:  Ava Link MD     Admission Diagnoses: Oligohydramnios [O41.00X0]    Discharge Diagnoses:   Information for the patient's :  Navas Fitting [[id number]]   Delivery of a 2.93 kg male infant via Vaginal, Spontaneous on 2022 at 6:22 AM  by Sachi Valdez. Apgars were 8  and 9 . Additional Diagnoses:   Hospital Problems  Date Reviewed: 2022          Codes Class Noted POA    Oligohydramnios ICD-10-CM: O41.00X0  ICD-9-CM: 658.00  2022 Unknown             Lab Results   Component Value Date/Time    Rubella, External Immune 12.5 2021 12:00 AM    GrBStrep, External Negative 2022 12:00 AM       Hospital Course: Normal hospital course following the delivery. Patient Instructions:   Current Discharge Medication List      CONTINUE these medications which have CHANGED    Details   ibuprofen (MOTRIN) 800 mg tablet Take 1 Tablet by mouth every eight (8) hours as needed for Pain. Qty: 30 Tablet, Refills: 0         CONTINUE these medications which have NOT CHANGED    Details   PNV no.106-iron-folate no6-dha 27.5 mg iron- 1 mg cap Take 1 Tab by mouth daily. STOP taking these medications       ondansetron (ZOFRAN ODT) 4 mg disintegrating tablet Comments:   Reason for Stopping:         aluminum-magnesium hydroxide 200-200 mg/5 mL susp 5 mL, diphenhydrAMINE 12.5 mg/5 mL liqd 12.5 mg, lidocaine 2 % soln 5 mL Comments:   Reason for Stopping:               Disposition at Discharge: Home or self care    Condition at Discharge: Stable    Reference my discharge instructions.     Follow-up Appointments   Procedures    FOLLOW UP VISIT Appointment in: 6 Weeks     Standing Status:   Standing     Number of Occurrences:   1     Order Specific Question:   Appointment in     Answer:   6 Weeks        Signed By:  Julia Rodriguez MD     February 4, 2022

## 2022-02-04 NOTE — DISCHARGE INSTRUCTIONS
POST DELIVERY DISCHARGE INSTRUCTIONS    Name: Arlyn Bland  YOB: 1995  Primary Diagnosis: Active Problems:    Oligohydramnios (2/1/2022)        General:     Diet/Diet Restrictions:  · Eight 8-ounce glasses of fluid daily (water, juices); avoid excessive caffeine intake. · Meals/snacks as desired which are high in fiber and carbohydrates and low in fat and cholesterol. Medications:   {Medication reconciliation information is now added to the patient's AVS automatically when it is printed. There is no need to use this SmartLink in discharge instructions. Highlight this text and delete it to clear this message}      Physical Activity / Restrictions / Safety:     · Avoid heavy lifting, no more that 8 lbs. For 2-3 weeks;   · Limit use of stairs to 2 times daily for the first week home. · No driving for one week. · Avoid intercourse 4-6 weeks, no douching or tampon use. · Check with obstetrician before starting or resuming an exercise program.      Discharge Instructions/Special Treatment/Home Care Needs:     · Continue prenatal vitamins. · Continue to use squirt bottle with warm water on your episiotomy after each bathroom use until bleeding stops. · If steri-strips applied to your incision, remove in 7-10 days. Call your doctor for the following:     · Fever over 101 degrees by mouth. · Vaginal bleeding heavier than a normal menstrual period or clots larger than a golf ball. · Red streaks or increased swelling of legs, painful red streaks on your breast.  · Painful urination, constipation and increased pain or swelling or discharge with your incision. · If you feel extremely anxious or overwhelmed. · If you have thoughts of harming yourself and/or your baby. Pain Management:     · Take Acetaminophen (Tylenol) or Ibuprofen (Advil, Motrin), as directed for pain. · Use a warm Sitz bath 3 times daily to relieve episiotomy or hemorrhoidal discomfort.    · For hemorrhoidal discomfort, use Tucks and Anusol cream as needed and directed. · Heating pad to  incision as needed. Follow-Up Care:     Appointment with MD:   Follow-up Appointments   Procedures    FOLLOW UP VISIT Appointment in: 6 Weeks     Standing Status:   Standing     Number of Occurrences:   1     Order Specific Question:   Appointment in     Answer:   6 Weeks     Telephone number: [phone]    Signed By: Lilliana Rojas MD                                                                                                   Date: 2022 Time: 8:29 AM  Patient Education        After Your Delivery (the Postpartum Period): Care Instructions  Your Care Instructions     Congratulations on the birth of your baby. Like pregnancy, the  period can be a time of excitement, jenise, and exhaustion. You may look at your wondrous little baby and feel happy. You may also be overwhelmed by your new sleep hours and new responsibilities. At first, babies often sleep during the days and are awake at night. They do not have a pattern or routine. They may make sudden gasps, jerk themselves awake, or look like they have crossed eyes. These are all normal, and they may even make you smile. In these first weeks after delivery, try to take good care of yourself. It may take 4 to 6 weeks to feel like yourself again, and possibly longer if you had a  birth. You will likely feel very tired for several weeks. Your days will be full of ups and downs, but lots of jenise as well. Follow-up care is a key part of your treatment and safety. Be sure to make and go to all appointments, and call your doctor if you are having problems. It's also a good idea to know your test results and keep a list of the medicines you take. How can you care for yourself at home? Take care of your body after delivery  · Use pads instead of tampons for the bloody flow that may last as long as 2 weeks. · Ease cramps with ibuprofen (Advil, Motrin).   · Ease soreness of hemorrhoids and the area between your vagina and rectum with ice compresses or witch hazel pads. · Ease constipation by drinking lots of fluid and eating high-fiber foods. Ask your doctor about over-the-counter stool softeners. · Cleanse yourself with a gentle squeeze of warm water from a bottle instead of wiping with toilet paper. · Take a sitz bath in warm water several times a day. · Wear a good nursing bra. Ease sore and swollen breasts with warm, wet washcloths. · If you aren't breastfeeding, use ice rather than heat for breast soreness. · Your period may not start for several months if you are breastfeeding. You may bleed more, and longer at first, than you did before you got pregnant. · Wait until you are healed (about 4 to 6 weeks) before you have sex. Ask your doctor when it is okay for you to have sex. · Try not to travel with your baby for 5 or 6 weeks. If you take a long car trip, make frequent stops to walk around and stretch. Avoid exhaustion  · Rest every day. Try to nap when your baby naps. · Ask another adult to be with you for a few days after delivery. · Plan for  if you have other children. · Stay flexible so you can eat at odd hours and sleep when you need to. Both you and your baby are making new schedules. · Plan small trips to get out of the house. Change can make you feel less tired. · Ask for help with housework, cooking, and shopping. Remind yourself that your job is to care for your baby. Know about help for postpartum depression  · \"Baby blues\" are common for the first 1 to 2 weeks after birth. You may cry or feel sad or irritable for no reason. · Rest whenever you can. Being tired makes it harder to handle your emotions. · Go for walks with your baby. · Talk to your partner, friends, and family about your feelings. · If your symptoms last for more than a few weeks, or if you feel very depressed, ask your doctor for help.   · Postpartum depression can be treated. Support groups and counseling can help. Sometimes medicine can also help. Stay healthy  · Eat healthy foods so you have more energy. · If you breastfeed, avoid drugs. If you quit smoking during pregnancy, try to stay smoke-free. If you choose to have a drink now and then, have only one drink, and limit the number of occasions that you have a drink. Wait to breastfeed at least 2 hours after you have a drink to reduce the amount of alcohol the baby may get in the milk. · Start daily exercise after 4 to 6 weeks, but rest when you feel tired. · Learn exercises to tone your belly. Do Kegel exercises to regain strength in your pelvic muscles. You can do these exercises while you stand or sit. ? Squeeze the same muscles you would use to stop your urine. Your belly and thighs should not move. ? Hold the squeeze for 3 seconds, and then relax for 3 seconds. ? Start with 3 seconds. Then add 1 second each week until you are able to squeeze for 10 seconds. ? Repeat the exercise 10 to 15 times for each session. Do three or more sessions each day. · Find a class for you and your baby that has an exercise time. · If you had a  birth, give yourself a bit more time before you exercise, and be careful. When should you call for help? Call 911  anytime you think you may need emergency care. For example, call if:    · You have thoughts of harming yourself, your baby, or another person.     · You passed out (lost consciousness).     · You have chest pain, are short of breath, or cough up blood.     · You have a seizure. Call your doctor now or seek immediate medical care if:    · You have severe vaginal bleeding.  This means you are passing blood clots and soaking through a pad each hour for 2 or more hours.     · You are dizzy or lightheaded, or you feel like you may faint.     · You have a fever.     · You have new or more belly pain.     · You have signs of a blood clot in your leg (called a deep vein thrombosis), such as:  ? Pain in the calf, back of the knee, thigh, or groin. ? Redness and swelling in your leg or groin.     · You have signs of preeclampsia, such as:  ? Sudden swelling of your face, hands, or feet. ? New vision problems (such as dimness, blurring, or seeing spots). ? A severe headache. Watch closely for changes in your health, and be sure to contact your doctor if:    · Your vaginal bleeding seems to be getting heavier.     · You have new or worse vaginal discharge.     · You feel sad, anxious, or hopeless for more than a few days.     · You do not get better as expected. Where can you learn more? Go to http://www.[employer].com/  Enter A461 in the search box to learn more about \"After Your Delivery (the Postpartum Period): Care Instructions. \"  Current as of: June 16, 2021               Content Version: 13.0  © 4376-7985 [employer]. Care instructions adapted under license by [employer] (which disclaims liability or warranty for this information). If you have questions about a medical condition or this instruction, always ask your healthcare professional. [place] [address] any warranty or liability for your use of this information. children

## 2022-02-04 NOTE — PROGRESS NOTES
Bedside shift change report given to PAULA Varma RN (oncoming nurse) by SARAH Vigil RN (offgoing nurse). Report included the following information SBAR, Kardex, Intake/Output and MAR.

## 2022-03-06 ENCOUNTER — HOSPITAL ENCOUNTER (EMERGENCY)
Age: 27
Discharge: HOME | End: 2022-03-06
Attending: EMERGENCY MEDICINE
Payer: COMMERCIAL

## 2022-03-06 ENCOUNTER — APPOINTMENT (OUTPATIENT)
Dept: GENERAL RADIOLOGY | Age: 27
End: 2022-03-06
Attending: EMERGENCY MEDICINE
Payer: COMMERCIAL

## 2022-03-06 VITALS
RESPIRATION RATE: 16 BRPM | BODY MASS INDEX: 44 KG/M2 | HEART RATE: 86 BPM | OXYGEN SATURATION: 98 % | TEMPERATURE: 97.8 F | WEIGHT: 257.72 LBS | SYSTOLIC BLOOD PRESSURE: 115 MMHG | HEIGHT: 64 IN | DIASTOLIC BLOOD PRESSURE: 61 MMHG

## 2022-03-06 DIAGNOSIS — K59.01 SLOW TRANSIT CONSTIPATION: Primary | ICD-10-CM

## 2022-03-06 DIAGNOSIS — K64.9 BLEEDING HEMORRHOIDS: ICD-10-CM

## 2022-03-06 PROCEDURE — 99283 EMERGENCY DEPT VISIT LOW MDM: CPT

## 2022-03-06 PROCEDURE — 74018 RADEX ABDOMEN 1 VIEW: CPT

## 2022-03-06 RX ORDER — LACTULOSE 10 G/15ML
20 SOLUTION ORAL; RECTAL 2 TIMES DAILY
Qty: 480 ML | Refills: 0 | Status: SHIPPED | OUTPATIENT
Start: 2022-03-06 | End: 2022-03-06 | Stop reason: SDUPTHER

## 2022-03-06 RX ORDER — LACTULOSE 10 G/15ML
20 SOLUTION ORAL; RECTAL 2 TIMES DAILY
Qty: 480 ML | Refills: 0 | Status: SHIPPED | OUTPATIENT
Start: 2022-03-06 | End: 2022-03-14

## 2022-03-06 RX ORDER — MAGNESIUM CITRATE
296 SOLUTION, ORAL ORAL
Qty: 1 EACH | Refills: 0 | Status: SHIPPED | OUTPATIENT
Start: 2022-03-06 | End: 2022-03-06 | Stop reason: SDUPTHER

## 2022-03-06 RX ORDER — MAGNESIUM CITRATE
296 SOLUTION, ORAL ORAL
Qty: 1 EACH | Refills: 0 | Status: SHIPPED | OUTPATIENT
Start: 2022-03-06 | End: 2022-03-06

## 2022-03-06 NOTE — ED PROVIDER NOTES
EMERGENCY DEPARTMENT HISTORY AND PHYSICAL EXAM      Date: 3/6/2022  Patient Name: Violet Gann  Patient Age and Sex: 27 y.o. female     History of Presenting Illness     Chief Complaint   Patient presents with    Constipation     Pt ambulatory into triage with a cc of constipation x 1 month; pt states she has tried laxatives with no relief; pt states she has been constipated since giving birth to her son 1 month ago and has only had about 2-3 bowel movements since then    Rectal Bleeding     bright red blood in when trying to have bowel movement last night       History Provided By: Patient    HPI: Violet Gann is a 30-year-old female who is postpartum presenting with 1 month of constipation. Patient states that since having her baby a month ago has been constipated. Patient states that she is only had 2 or 3 bowel movements since then and they have been small bowel movements. Now having a lot of rectal pain especially with straining and has noticed some bright red blood last night. States she does not know if there are hemorrhoids down there. Denies any nausea, vomiting, abdominal pain associated with it. There are no other complaints, changes, or physical findings at this time. PCP: Arianna Grijalva MD    No current facility-administered medications on file prior to encounter. Current Outpatient Medications on File Prior to Encounter   Medication Sig Dispense Refill    ibuprofen (MOTRIN) 800 mg tablet Take 1 Tablet by mouth every eight (8) hours as needed for Pain. 30 Tablet 0    PNV no.106-iron-folate no6-dha 27.5 mg iron- 1 mg cap Take 1 Tab by mouth daily.          Past History     Past Medical History:  Past Medical History:   Diagnosis Date    Abdominal pain, other specified site     Anemia     Keratosis pilaris 10/1/2009    Otitis media        Past Surgical History:  Past Surgical History:   Procedure Laterality Date    COLONOSCOPY N/A 9/17/2020    COLONOSCOPY/EGD performed by Royce Ortiz MD [facility] at [facility] ENDOSCOPY    HX ADENOIDECTOMY      HX GYN      HX HEENT      tubal/tonsils and adnoids    HX OTHER SURGICAL      tonsilectomy childhood    HX OTHER SURGICAL      left ovary cyst removal     HX TONSILLECTOMY      HX TYMPANOSTOMY         Family History:  Family History   Problem Relation Age of Onset    Asthma Mother     Stroke Father     No Known Problems Sister     Asthma Brother     No Known Problems Brother     No Known Problems Sister        Social History:  Social History     Tobacco Use    Smoking status: Never Smoker    Smokeless tobacco: Never Used   Vaping Use    Vaping Use: Never used   Substance Use Topics    Alcohol use: Not Currently    Drug use: No       Allergies:  No Known Allergies      Review of Systems   Review of Systems   Constitutional: Negative for chills and fever. Respiratory: Negative for cough and shortness of breath. Cardiovascular: Negative for chest pain. Gastrointestinal: Positive for blood in stool, constipation and rectal pain. Negative for abdominal pain, diarrhea, nausea and vomiting. Genitourinary: Negative for dysuria, frequency and hematuria. Neurological: Negative for weakness and numbness. All other systems reviewed and are negative. Physical Exam   Physical Exam  Constitutional:       General: She is not in acute distress. Appearance: She is well-developed. HENT:      Head: Normocephalic and atraumatic. Nose: Nose normal.      Mouth/Throat:      Mouth: Mucous membranes are moist.   Eyes:      Extraocular Movements: Extraocular movements intact. Conjunctiva/sclera: Conjunctivae normal.   Cardiovascular:      Comments: Well perfused  Pulmonary:      Effort: Pulmonary effort is normal. No respiratory distress. Genitourinary:     Comments: Has a small hemorrhoid no active bleeding. Deferred internal exam secondary to pain. No fissures or tears  Musculoskeletal:         General: Normal range of motion. Cervical back: Normal range of motion. Neurological:      General: No focal deficit present. Mental Status: She is alert and oriented to person, place, and time. Psychiatric:         Mood and Affect: Mood normal.          Diagnostic Study Results     Labs -   No results found for this or any previous visit (from the past 12 hour(s)). Radiologic Studies -   XR ABD (KUB)    (Results Pending)     CT Results  (Last 48 hours)    None        CXR Results  (Last 48 hours)    None            Medical Decision Making   I am the first provider for this patient. I reviewed the vital signs, available nursing notes, past medical history, past surgical history, family history and social history. Vital Signs-Reviewed the patient's vital signs. Patient Vitals for the past 12 hrs:   Temp Pulse Resp BP SpO2   03/06/22 1245    115/61 99 %   03/06/22 1243    (!) 113/56    03/06/22 1233 97.8 °F (36.6 °C) 86 16 133/66 100 %       Records Reviewed: Nursing Notes and Old Medical Records    Provider Notes (Medical Decision Making):   Patient presenting with blood in the stool as well as constipation. Most likely all related to internal hemorrhoids and bleeding due to straining and constipation. Abdominal exam is very benign and no signs of blockages. Will get x-ray just to evaluate how much stool burden she has. Will consider magnesium citrate versus lactulose    ED Course:   Initial assessment performed. The patients presenting problems have been discussed, and they are in agreement with the care plan formulated and outlined with them. I have encouraged them to ask questions as they arise throughout their visit. Critical Care Time:   0    Disposition:  Discharge Note:  The patient has been re-evaluated and is ready for discharge. Reviewed available results with patient. Counseled patient on diagnosis and care plan. Patient has expressed understanding, and all questions have been answered.  Patient agrees with plan and agrees to follow up as recommended, or to return to the ED if their symptoms worsen. Discharge instructions have been provided and explained to the patient, along with reasons to return to the ED. PLAN:  Current Discharge Medication List      START taking these medications    Details   magnesium citrate solution Take 296 mL by mouth now for 1 dose. Qty: 1 Each, Refills: 0  Start date: 3/6/2022, End date: 3/6/2022      lactulose (CHRONULAC) 10 gram/15 mL solution Take 30 mL by mouth two (2) times a day for 8 days. Qty: 480 mL, Refills: 0  Start date: 3/6/2022, End date: 3/14/2022      phenyleph-min oil-petrolatum (Preparation H) 0.25-14-74.9 % rectal ointment by PeriANAL route two (2) times a day. Qty: 1 Each, Refills: 0  Start date: 3/6/2022           2. Follow-up Information     Follow up With Specialties Details Why Contact Info    Pankaj Lira MD Internal Medicine Schedule an appointment as soon as possible for a visit   [address] [address]. [phone]          3. Return to ED if worse     Diagnosis     Clinical Impression:   1. Slow transit constipation    2. Bleeding hemorrhoids        Attestations:    Richelle Boyd M.D. Please note that this dictation was completed with iHigh, the computer voice recognition software. Quite often unanticipated grammatical, syntax, homophones, and other interpretive errors are inadvertently transcribed by the computer software. Please disregard these errors. Please excuse any errors that have escaped final proofreading. Thank you.

## 2022-03-06 NOTE — ED NOTES
DC papers reviewed and in hand, pt verbalized understanding. Ambulatory out of ER without assist, no acute distress noted.

## 2022-03-18 PROBLEM — N83.53 TORSION OF OVARY, OVARIAN PEDICLE AND FALLOPIAN TUBE: Status: ACTIVE | Noted: 2017-04-04

## 2022-03-18 PROBLEM — N83.202 OVARIAN CYST, LEFT: Status: ACTIVE | Noted: 2017-04-03

## 2022-03-18 PROBLEM — O41.00X0 OLIGOHYDRAMNIOS: Status: ACTIVE | Noted: 2022-02-01

## 2022-07-18 ENCOUNTER — HOSPITAL ENCOUNTER (EMERGENCY)
Age: 27
Discharge: HOME | End: 2022-07-18
Attending: EMERGENCY MEDICINE | Admitting: EMERGENCY MEDICINE
Payer: COMMERCIAL

## 2022-07-18 VITALS
WEIGHT: 247.8 LBS | HEART RATE: 98 BPM | TEMPERATURE: 97.7 F | DIASTOLIC BLOOD PRESSURE: 62 MMHG | OXYGEN SATURATION: 99 % | BODY MASS INDEX: 42.3 KG/M2 | RESPIRATION RATE: 16 BRPM | SYSTOLIC BLOOD PRESSURE: 121 MMHG | HEIGHT: 64 IN

## 2022-07-18 DIAGNOSIS — J02.9 ACUTE PHARYNGITIS, UNSPECIFIED ETIOLOGY: Primary | ICD-10-CM

## 2022-07-18 LAB — DEPRECATED S PYO AG THROAT QL EIA: NEGATIVE

## 2022-07-18 PROCEDURE — 74011000250 HC RX REV CODE- 250: Performed by: EMERGENCY MEDICINE

## 2022-07-18 PROCEDURE — 99283 EMERGENCY DEPT VISIT LOW MDM: CPT

## 2022-07-18 PROCEDURE — 87147 CULTURE TYPE IMMUNOLOGIC: CPT

## 2022-07-18 PROCEDURE — 87070 CULTURE OTHR SPECIMN AEROBIC: CPT

## 2022-07-18 PROCEDURE — 74011250637 HC RX REV CODE- 250/637: Performed by: EMERGENCY MEDICINE

## 2022-07-18 PROCEDURE — 87880 STREP A ASSAY W/OPTIC: CPT

## 2022-07-18 RX ORDER — IBUPROFEN 400 MG/1
800 TABLET ORAL
Status: COMPLETED | OUTPATIENT
Start: 2022-07-18 | End: 2022-07-18

## 2022-07-18 RX ORDER — IBUPROFEN 800 MG/1
800 TABLET ORAL
Qty: 20 TABLET | Refills: 0 | Status: SHIPPED | OUTPATIENT
Start: 2022-07-18 | End: 2022-07-25

## 2022-07-18 RX ORDER — LIDOCAINE HYDROCHLORIDE 20 MG/ML
15 SOLUTION OROPHARYNGEAL
Status: COMPLETED | OUTPATIENT
Start: 2022-07-18 | End: 2022-07-18

## 2022-07-18 RX ORDER — AMOXICILLIN 500 MG/1
500 TABLET, FILM COATED ORAL 2 TIMES DAILY
Qty: 20 TABLET | Refills: 0 | Status: SHIPPED | OUTPATIENT
Start: 2022-07-18 | End: 2022-07-28

## 2022-07-18 RX ADMIN — LIDOCAINE HYDROCHLORIDE 15 ML: 20 SOLUTION ORAL at 05:10

## 2022-07-18 RX ADMIN — IBUPROFEN 800 MG: 400 TABLET, FILM COATED ORAL at 05:10

## 2022-07-18 NOTE — DISCHARGE INSTRUCTIONS
Thank You! It was a pleasure taking care of you in our Emergency Department today. We know that when you come to [facility], you are entrusting us with your health, comfort, and safety. Our physicians and nurses honor that trust, and truly appreciate the opportunity to care for you and your loved ones. We also value your feedback. If you receive a survey about your Emergency Department experience today, please fill it out. We care about our patients' feedback, and we listen to what you have to say. Thank you. Dr. Toniann Habermann, M.D.      ________________________________________________________________________  I have included a copy of your lab results and/or radiologic studies from today's visit so you can have them easily available at your follow-up visit. We hope you feel better and please do not hesitate to contact the ED if you have any questions at all! Recent Results (from the past 12 hour(s))   STREP AG SCREEN, GROUP A    Collection Time: 07/18/22  5:08 AM    Specimen: Swab; Throat   Result Value Ref Range    Group A Strep Ag ID Negative NEG         No orders to display     CT Results  (Last 48 hours)      None          The exam and treatment you received in the Emergency Department were for an urgent problem and are not intended as complete care. It is important that you follow up with a doctor, nurse practitioner, or physician assistant for ongoing care. If your symptoms become worse or you do not improve as expected and you are unable to reach your usual health care provider, you should return to the Emergency Department. We are available 24 hours a day. Please take your discharge instructions with you when you go to your follow-up appointment. If a prescription has been provided, please have it filled as soon as possible to prevent a delay in treatment. Read the entire medication instruction sheet provided to you by the pharmacy.  If you have any questions or reservations about taking the medication due to side effects or interactions with other medications, please call your primary care physician or contact the ER to speak with the charge nurse. Please make an appointment with your family doctor or the physician you were referred to for follow-up of this visit as instructed on your discharge paperwork. Return to the ER if you are unable to be seen or if you are unable to be seen in a timely manner. Should you experience abdominal pain lasting greater than 6 hours, chest pain, difficulty breathing, fever/chills, numbness/tingling, skin changes or other symptoms that concern you, return to the ED sooner. If you feel worse over the next 24 hours, please return to the ED. We are available 24 hours a day. Thank you for trusting us with your care!

## 2022-07-18 NOTE — ED PROVIDER NOTES
EMERGENCY DEPARTMENT HISTORY AND PHYSICAL EXAM      Please note that this dictation was completed with the assistance of \"Dragon\", the computer voice recognition software. Quite often unanticipated grammatical, syntax, homophones, and other interpretive errors are inadvertently transcribed by the computer software. Please disregard these errors and any errors that have escaped final proofreading. Thank you. Date: 07/18/22  Patient: Leah Dempsey  Patient Age and Sex: 24 y.o. female   MRN: [id number]  CSN: [id number]    History of Presenting Illness     Chief Complaint   Patient presents with    Sore Throat     She is complaining of a sore throat two days ago and nausea that started this morning. Vomited twice today. Denies blood in the vomit. Complaining of intermittent shortness of breath. Denies chest pain, abdominal pain, diarrhea or constipation. Denies any analgesics. She advises that the family has been passing around a stomach bug.     History Provided By: Patient/family/EMS (if applicable)    HPI: Leah Dempsey, 24 y.o. female with past medical history as documented below presents to the ED with c/o of two days of sore throat. States pain is moderate in intensity, burning and worse with swallowing. No meds PTA. States there have been sick contacts at home but no concerns for COVID-19. Pt denies any other exacerbating or ameliorating factors. Additionally, pt specifically denies any recent fever, chills, headache, nausea, vomiting, abdominal pain, CP, SOB, lightheadedness, dizziness, numbness, weakness, lower extremity swelling, heart palpitations, urinary sxs, diarrhea, constipation, melena, hematochezia, cough, or congestion. There are no other complaints, changes or physical findings pertinent to the HPI at this time.     PCP: Kristina Crow MD  Past History   Past Medical History:  Past Medical History:   Diagnosis Date    Abdominal pain, other specified site     Anemia     Keratosis pilaris 10/1/2009 Otitis media        Past Surgical History:  Past Surgical History:   Procedure Laterality Date    COLONOSCOPY N/A 9/17/2020    COLONOSCOPY/EGD performed by Logan White MD at [facility] ENDOSCOPY    HX ADENOIDECTOMY      HX GYN      HX HEENT      tubal/tonsils and adnoids    HX OTHER SURGICAL      tonsilectomy childhood    HX OTHER SURGICAL      left ovary cyst removal     HX TONSILLECTOMY      HX TYMPANOSTOMY         Family History:   Family history reviewed and was non-contributory, unless specified below:  Family History   Problem Relation Age of Onset    Asthma Mother     Stroke Father     No Known Problems Sister     Asthma Brother     No Known Problems Brother     No Known Problems Sister        Social History:  Social History     Tobacco Use    Smoking status: Never Smoker    Smokeless tobacco: Never Used   Vaping Use    Vaping Use: Never used   Substance Use Topics    Alcohol use: Not Currently    Drug use: No       Allergies:  No Known Allergies    Current Medications:  No current facility-administered medications on file prior to encounter. Current Outpatient Medications on File Prior to Encounter   Medication Sig Dispense Refill    phenyleph-min oil-petrolatum (Preparation H) 0.25-14-74.9 % rectal ointment by PeriANAL route two (2) times a day. 1 Each 0    ibuprofen (MOTRIN) 800 mg tablet Take 1 Tablet by mouth every eight (8) hours as needed for Pain. 30 Tablet 0    PNV no.106-iron-folate no6-dha 27.5 mg iron- 1 mg cap Take 1 Tab by mouth daily. Review of Systems   A complete ROS was reviewed by me today and all other systems negative, unless otherwise specified below:  Review of Systems   Constitutional: Negative. Negative for chills and fever. HENT:  Positive for sore throat. Negative for congestion. Eyes: Negative. Respiratory: Negative. Negative for cough, chest tightness, shortness of breath and wheezing. Cardiovascular: Negative.   Negative for chest pain, palpitations and leg swelling. Gastrointestinal: Negative. Negative for abdominal distention, abdominal pain, blood in stool, constipation, diarrhea, nausea and vomiting. Endocrine: Negative. Genitourinary: Negative. Negative for difficulty urinating, dysuria, flank pain, frequency, hematuria and urgency. Musculoskeletal: Negative. Negative for back pain and neck stiffness. Skin: Negative. Negative for rash. Allergic/Immunologic: Negative. Neurological: Negative. Negative for dizziness, syncope, weakness, light-headedness, numbness and headaches. Hematological: Negative. Psychiatric/Behavioral: Negative. Negative for confusion and self-injury. Physical Exam   Physical Exam  Vitals and nursing note reviewed. Constitutional:       General: She is not in acute distress. Appearance: She is well-developed. She is not diaphoretic. HENT:      Head: Normocephalic and atraumatic. Mouth/Throat:      Pharynx: Posterior oropharyngeal erythema present. No oropharyngeal exudate. Eyes:      Conjunctiva/sclera: Conjunctivae normal.      Pupils: Pupils are equal, round, and reactive to light. Cardiovascular:      Rate and Rhythm: Normal rate and regular rhythm. Heart sounds: Normal heart sounds. No murmur heard. No friction rub. No gallop. Pulmonary:      Effort: Pulmonary effort is normal. No respiratory distress. Breath sounds: Normal breath sounds. No wheezing or rales. Chest:      Chest wall: No tenderness. Abdominal:      General: Bowel sounds are normal. There is no distension. Palpations: Abdomen is soft. There is no mass. Tenderness: There is no abdominal tenderness. There is no guarding or rebound. Musculoskeletal:         General: No tenderness or deformity. Normal range of motion. Cervical back: Normal range of motion. Skin:     General: Skin is warm. Findings: No rash.    Neurological:      Mental Status: She is alert and oriented to person, place, and time.      Cranial Nerves: No cranial nerve deficit. Motor: No abnormal muscle tone. Coordination: Coordination normal.      Deep Tendon Reflexes: Reflexes normal.     Diagnostic Study Results     Laboratory Data  I have personally reviewed and interpreted all available laboratory results. Recent Results (from the past 24 hour(s))   STREP AG SCREEN, GROUP A    Collection Time: 07/18/22  5:08 AM    Specimen: Swab; Throat   Result Value Ref Range    Group A Strep Ag ID Negative NEG         Radiologic Studies   I have personally reviewed and interpreted all available imaging studies and agree with radiology interpretation. No orders to display     CT Results  (Last 48 hours)      None          CXR Results  (Last 48 hours)      None          Medical Decision Making   I am the first and primary ED physician for this patient's ED visit today. I reviewed our electronic medical record system for any past medical records that may contribute to the patient's current condition, including their past medical history, surgical history, social and family history. This also includes their most recent ED visits, previous hospitalizations and prior diagnostic data. I have reviewed and summarized the most pertinent findings in my HPI and MDM. Vital Signs Reviewed:  Patient Vitals for the past 24 hrs:   Temp Pulse Resp BP SpO2   07/18/22 0431 97.7 °F (36.5 °C) 98 16 121/62 99 %     Pulse Oximetry Analysis: 99% on RA    Records Reviewed: Nursing Notes, Old Medical Records, Previous electrocardiograms, Previous Radiology Studies and Previous Laboratory Studies, EMS reports    Provider Notes (Medical Decision Making):   DDx: viral pharyngitis, strep pharyngitis, URI, flu like illness    Pt presents with sore throat; stable vitals and nontoxic appearing. Airway stable.   On clinical exam, the patient has no peritonsillar abscess, voice chances or uvula deviation to suggest peritonsillar abscess    There is no drooling, tripoding, voice changes, dysphagia/odynophagia, or difficulty breathing to suggest epiglottitis    There are no voices changes, bulge to back of throat, dysphagia/odynophagia, difficulty with flexing/extending neck to suggest retropharyngeal abscess    There is no induration to the sumental area to suggest Ludwigs angina. Furthermore, dentition is not poor    Will order strep test. If negative, treat for viral pharyngitis. ED Course:   Initial assessment performed. I discussed presenting problems and concerns, and my formulated plan for today's visit with the patient and any available family members. I have encouraged them to ask questions as they arise throughout the visit. Social History     Tobacco Use    Smoking status: Never Smoker    Smokeless tobacco: Never Used   Vaping Use    Vaping Use: Never used   Substance Use Topics    Alcohol use: Not Currently    Drug use: No       ED Orders Placed:  Orders Placed This Encounter    STREP AG SCREEN, GROUP A    CULTURE, THROAT    lidocaine (XYLOCAINE) 2 % viscous solution 15 mL    ibuprofen (MOTRIN) tablet 800 mg    ibuprofen (MOTRIN) 800 mg tablet    benzocaine-menthoL (Sore Throat, benzocaine-menth,) 6-10 mg lozg    amoxicillin 500 mg tab       ED Medications Administered During ED Course:  Medications   lidocaine (XYLOCAINE) 2 % viscous solution 15 mL (15 mL Mouth/Throat Given 7/18/22 0510)   ibuprofen (MOTRIN) tablet 800 mg (800 mg Oral Given 7/18/22 0510)        Progress Note:  I have just re-evaluated the patient. Patient reports improvement of sx's. I have reviewed Her vital signs and determined there is currently no worsening in their condition or physical exam. Results have been reviewed with them and their questions have been answered. We will continue to review further results as they come available. Progress Note:  Pt reassessed and symptoms noted to have improved significantly after ED treatment. Pt is clinically stable for discharge.  Richelle Ron's labs and imaging have been reviewed with her and available family. She verbally conveys understanding and agreement of the signs, symptoms, diagnosis, treatment and prognosis and additionally agrees to follow up as recommended with Dr. Nya Rowley MD and/or specialist as instructed. She agrees with the care plan we have created and conveys that all of her questions have been answered. Additionally, I have put together a packet of discharge instructions for her that include: 1) educational information regarding their diagnosis, 2) how to care for their diagnosis at home, as well a 3) list of reasons why they would want to return to the ED prior to their follow-up appointment should the patient's condition change or symptoms worsen. I have answered all questions to the patient's satisfaction. Strict return precautions given. She conveyed understanding and agreement with care plan. Vital signs stable for discharge. Disposition:  DISCHARGE  The pt is ready for discharge. The pt's signs, symptoms, diagnosis, and discharge instructions have been discussed and pt has conveyed their understanding. The pt is to follow up as recommended or return to ER should their symptoms worsen. Plan has been discussed and pt is in agreement. Plan:  1. Return precautions as discussed with patient and available family/caregiver. 2.   Discharge Medication List as of 7/18/2022  5:35 AM        START taking these medications    Details   !! ibuprofen (MOTRIN) 800 mg tablet Take 1 Tablet by mouth every six (6) hours as needed for Pain for up to 7 days. , Normal, Disp-20 Tablet, R-0      benzocaine-menthoL (Sore Throat, benzocaine-menth,) 6-10 mg lozg 1 Lozenge by Mucous Membrane route as needed for Pain., Normal, Disp-18 Each, R-0      amoxicillin 500 mg tab Take 500 mg by mouth two (2) times a day for 10 days. , Normal, Disp-20 Tablet, R-0       !! - Potential duplicate medications found. Please discuss with provider. CONTINUE these medications which have NOT CHANGED    Details   phenyleph-min oil-petrolatum (Preparation H) 0.25-14-74.9 % rectal ointment by PeriANAL route two (2) times a day., Normal, Disp-1 Each, R-0      !! ibuprofen (MOTRIN) 800 mg tablet Take 1 Tablet by mouth every eight (8) hours as needed for Pain., Normal, Disp-30 Tablet, R-0      PNV no.106-iron-folate no6-dha 27.5 mg iron- 1 mg cap Take 1 Tab by mouth daily. , Historical Med       !! - Potential duplicate medications found. Please discuss with provider. 3.   Follow-up Information       Follow up With Specialties Details Why Contact Info    \[facility]\"" EMERGENCY DEPT Emergency Medicine  As needed, If symptoms worsen [address] [place] [address]  [phone]          Instructed to return to ED if worse  Diagnosis   Clinical Impression:  1. Acute pharyngitis, unspecified etiology      Attestation:  Lia Mendiola MD, am the attending of record for this patient. I personally performed the services described in this documentation on this date, 7/18/2022 for patient, Lupe Ontiveros. I have reviewed the chart and verified that the record is accurate and complete.

## 2022-07-18 NOTE — Clinical Note
[address]  [facility] EMERGENCY DEPT  [address] [place] [address]  Gokul Escalera [id number] [phone]    Work/School Note    Date: 7/18/2022    To Whom It May concern:    Casey Bray was seen and treated today in the emergency room by the following provider(s):  Attending Provider: Denita Sanchez MD.      Casey Bray is excused from work/school on 07/18/22 and 07/19/22. She is medically clear to return to work/school on 7/20/2022.        Sincerely,          Christy Gallagher MD

## 2022-07-18 NOTE — ED NOTES
Pt discharged to home in [place], states understanding of dc instructions and followup, rx x 3 sent, work note given

## 2022-07-20 LAB
BACTERIA SPEC CULT: NORMAL
SERVICE CMNT-IMP: NORMAL

## 2022-09-12 ENCOUNTER — HOSPITAL ENCOUNTER (EMERGENCY)
Age: 27
Discharge: HOME | End: 2022-09-12
Attending: STUDENT IN AN ORGANIZED HEALTH CARE EDUCATION/TRAINING PROGRAM
Payer: COMMERCIAL

## 2022-09-12 VITALS
TEMPERATURE: 98.3 F | SYSTOLIC BLOOD PRESSURE: 131 MMHG | OXYGEN SATURATION: 100 % | HEART RATE: 66 BPM | RESPIRATION RATE: 16 BRPM | DIASTOLIC BLOOD PRESSURE: 67 MMHG | BODY MASS INDEX: 43.7 KG/M2 | HEIGHT: 64 IN | WEIGHT: 255.95 LBS

## 2022-09-12 DIAGNOSIS — B96.89 BV (BACTERIAL VAGINOSIS): Primary | ICD-10-CM

## 2022-09-12 DIAGNOSIS — K64.9 HEMORRHOIDS, UNSPECIFIED HEMORRHOID TYPE: ICD-10-CM

## 2022-09-12 DIAGNOSIS — N76.0 BV (BACTERIAL VAGINOSIS): Primary | ICD-10-CM

## 2022-09-12 LAB
APPEARANCE UR: CLEAR
BACTERIA URNS QL MICRO: ABNORMAL /HPF
BILIRUB UR QL: NEGATIVE
CLUE CELLS VAG QL WET PREP: NORMAL
COLOR UR: ABNORMAL
EPITH CASTS URNS QL MICRO: ABNORMAL /LPF
GLUCOSE UR STRIP.AUTO-MCNC: NEGATIVE MG/DL
HCG UR QL: NEGATIVE
HGB UR QL STRIP: ABNORMAL
HYALINE CASTS URNS QL MICRO: ABNORMAL /LPF (ref 0–2)
KETONES UR QL STRIP.AUTO: NEGATIVE MG/DL
KOH PREP SPEC: NORMAL
LEUKOCYTE ESTERASE UR QL STRIP.AUTO: ABNORMAL
NITRITE UR QL STRIP.AUTO: NEGATIVE
PH UR STRIP: 6 [PH] (ref 5–8)
PROT UR STRIP-MCNC: ABNORMAL MG/DL
RBC #/AREA URNS HPF: ABNORMAL /HPF (ref 0–5)
SERVICE CMNT-IMP: NORMAL
SP GR UR REFRACTOMETRY: 1.03
T VAGINALIS VAG QL WET PREP: NORMAL
UA: UC IF INDICATED,UAUC: ABNORMAL
UROBILINOGEN UR QL STRIP.AUTO: 1 EU/DL (ref 0.2–1)
WBC URNS QL MICRO: ABNORMAL /HPF (ref 0–4)

## 2022-09-12 PROCEDURE — 87086 URINE CULTURE/COLONY COUNT: CPT

## 2022-09-12 PROCEDURE — 87210 SMEAR WET MOUNT SALINE/INK: CPT

## 2022-09-12 PROCEDURE — 87491 CHLMYD TRACH DNA AMP PROBE: CPT

## 2022-09-12 PROCEDURE — 81001 URINALYSIS AUTO W/SCOPE: CPT

## 2022-09-12 PROCEDURE — 99283 EMERGENCY DEPT VISIT LOW MDM: CPT

## 2022-09-12 PROCEDURE — 81025 URINE PREGNANCY TEST: CPT

## 2022-09-12 RX ORDER — POLYETHYLENE GLYCOL 3350 17 G/17G
17 POWDER, FOR SOLUTION ORAL DAILY
Qty: 116 G | Refills: 0 | Status: SHIPPED | OUTPATIENT
Start: 2022-09-12

## 2022-09-12 RX ORDER — HYDROCORTISONE 25 MG/G
CREAM TOPICAL 4 TIMES DAILY
Qty: 30 G | Refills: 0 | Status: SHIPPED | OUTPATIENT
Start: 2022-09-12

## 2022-09-12 RX ORDER — METRONIDAZOLE 500 MG/1
500 TABLET ORAL 2 TIMES DAILY
Qty: 14 TABLET | Refills: 0 | Status: SHIPPED | OUTPATIENT
Start: 2022-09-12 | End: 2022-09-19

## 2022-09-12 NOTE — Clinical Note
[address]  \[facility]\"" EMERGENCY DEPT  [address] [place] [id number]  [phone]    Work/School Note    Date: 9/12/2022    To Whom It May concern:      Prince Ayers was seen and treated today in the emergency room by the following provider(s):  Attending Provider: Lord Scotty MD.      Prince Ayers is excused from work/school on 09/12/22. She is clear to return to work/school on 09/13/22.         Sincerely,          Ace Boyd MD

## 2022-09-12 NOTE — ED PROVIDER NOTES
EMERGENCY DEPARTMENT HISTORY AND PHYSICAL EXAM      Date: 9/12/2022  Patient Name: Katie Meyers    History of Presenting Illness     Chief Complaint   Patient presents with    Pelvic Pain     Currently on cycle, worsening over the past two days, concern for hemorrhoid as well as well as burning with urination         HPI: Katie Meyers, 24 y.o. female presents to the ED with cc of pelvic pain. This has been going on for 2 days. She describes as a sharp pain in her vagina. She is on her period, however states that this feels different than her normal menstrual cramps. She denies any new vaginal discharge or rashes. It does not state that she wants to be treated for STDs, has got back together with the father of her children, and says that he was with someone else in the interim. She also states that she thinks she has a hemorrhoid, she had a hemorrhoid during her last pregnancy, she feels a small bump in the rectal region and some discomfort. She denies any abdominal pain, no vomiting, no diarrhea. She reports some mild dysuria. No fevers. There are no other complaints, changes, or physical findings at this time. PCP: Annika Frey MD    No current facility-administered medications on file prior to encounter. Current Outpatient Medications on File Prior to Encounter   Medication Sig Dispense Refill    benzocaine-menthoL (Sore Throat, benzocaine-menth,) 6-10 mg lozg 1 Lozenge by Mucous Membrane route as needed for Pain. 18 Each 0    phenyleph-min oil-petrolatum (Preparation H) 0.25-14-74.9 % rectal ointment by PeriANAL route two (2) times a day. 1 Each 0    ibuprofen (MOTRIN) 800 mg tablet Take 1 Tablet by mouth every eight (8) hours as needed for Pain. 30 Tablet 0    PNV no.106-iron-folate no6-dha 27.5 mg iron- 1 mg cap Take 1 Tab by mouth daily.          Past History     Past Medical History:  Past Medical History:   Diagnosis Date    Abdominal pain, other specified site     Anemia     Keratosis pilaris 10/1/2009    Otitis media        Past Surgical History:  Past Surgical History:   Procedure Laterality Date    COLONOSCOPY N/A 9/17/2020    COLONOSCOPY/EGD performed by [facility] at [facility] ENDOSCOPY    HX ADENOIDECTOMY      HX GYN      HX HEENT      tubal/tonsils and adnoids    HX OTHER SURGICAL      tonsilectomy childhood    HX OTHER SURGICAL      left ovary cyst removal     HX TONSILLECTOMY      HX TYMPANOSTOMY         Family History:  Family History   Problem Relation Age of Onset    Asthma Mother     Stroke Father     No Known Problems Sister     Asthma Brother     No Known Problems Brother     No Known Problems Sister        Social History:  Social History     Tobacco Use    Smoking status: Never    Smokeless tobacco: Never   Vaping Use    Vaping Use: Never used   Substance Use Topics    Alcohol use: Not Currently    Drug use: No       Allergies:  No Known Allergies      Review of Systems   no fever  No ear pain  No eye pain  no shortness of breath  no chest pain  no abdominal pain  Reports dysuria  no leg pain  No rash    Physical Exam   Physical Exam  Constitutional:       General: She is not in acute distress. Appearance: She is not toxic-appearing. HENT:      Head: Normocephalic. Eyes:      Extraocular Movements: Extraocular movements intact. Cardiovascular:      Rate and Rhythm: Normal rate and regular rhythm. Pulmonary:      Effort: Pulmonary effort is normal.      Breath sounds: Normal breath sounds. Abdominal:      Palpations: Abdomen is soft. Tenderness: There is no abdominal tenderness. Genitourinary:     Comments:  no rashes, erythema or tenderness of the external vagina. On rectal exam, there is a small, nonthrombosed, nontender external hemorrhoid without surrounding erythema or swelling. Musculoskeletal:         General: No tenderness or deformity. Cervical back: Neck supple. Skin:     General: Skin is warm and dry. Neurological:      General: No focal deficit present. Mental Status: She is alert. Psychiatric:         Mood and Affect: Mood normal.       Diagnostic Study Results     Labs -     Recent Results (from the past 24 hour(s))   URINALYSIS W/ REFLEX CULTURE    Collection Time: 09/12/22 12:36 PM    Specimen: Urine   Result Value Ref Range    Color YELLOW/STRAW      Appearance CLEAR CLEAR      Specific gravity 1.027      pH (UA) 6.0 5.0 - 8.0      Protein TRACE (A) NEG mg/dL    Glucose Negative NEG mg/dL    Ketone Negative NEG mg/dL    Bilirubin Negative NEG      Blood LARGE (A) NEG      Urobilinogen 1.0 0.2 - 1.0 EU/dL    Nitrites Negative NEG      Leukocyte Esterase SMALL (A) NEG      UA:UC IF INDICATED URINE CULTURE ORDERED (A) CNI      WBC 20-50 0 - 4 /hpf    RBC 10-20 0 - 5 /hpf    Epithelial cells MANY (A) FEW /lpf    Bacteria 2+ (A) NEG /hpf    Hyaline cast 0-2 0 - 2 /lpf   HCG URINE, QL. - POC    Collection Time: 09/12/22 12:38 PM   Result Value Ref Range    Pregnancy test,urine (POC) Negative NEG     KOH, OTHER SOURCES    Collection Time: 09/12/22  1:11 PM    Specimen: Cervix; Other   Result Value Ref Range    Special Requests: NO SPECIAL REQUESTS      KOH NO YEAST SEEN     WET PREP    Collection Time: 09/12/22  1:11 PM    Specimen: Miscellaneous sample   Result Value Ref Range    Clue cells CLUE CELLS PRESENT      Wet prep NO TRICHOMONAS SEEN         Radiologic Studies -   No orders to display     CT Results  (Last 48 hours)      None          CXR Results  (Last 48 hours)      None              Medical Decision Making   I am the first provider for this patient. I reviewed the vital signs, available nursing notes, past medical history, past surgical history, family history and social history. Vital Signs-Reviewed the patient's vital signs.   Patient Vitals for the past 24 hrs:   Temp Pulse Resp BP SpO2   09/12/22 1229 98.3 °F (36.8 °C) 66 16 131/67 100 %         Provider Notes (Medical Decision Making):   42-year-old female presenting with pelvic pain. Differential includes cervicitis, STD, UTI, dysmenorrhea. She also has evidence of a hemorrhoid. Otherwise abdominal exam benign and nontender, she is afebrile and nontoxic-appearing, unlikely any other intra-abdominal or pelvic infection. ED Course:     Initial assessment performed. The patients presenting problems have been discussed, and they are in agreement with the care plan formulated and outlined with them. I have encouraged them to ask questions as they arise throughout their visit. Pregnancy test is negative, UA shows small leukocyte esterase, however contamination with many epithelial cells, and wet prep shows clue cells present. I suspect that white blood cells likely in setting of bacterial vaginosis, less likely UTI. Patient is resting comfortably on reevaluation. Patient is counseled on supportive care and return precautions. Will return to the ED for any worsening dysuria, pain, fevers, or any new or worrisome symptoms. Will followup with primary care doctor, OB/GYN within 3-5 days. Critical Care Time:         Disposition:  Home    PLAN:  1. Discharge Medication List as of 9/12/2022  2:15 PM        START taking these medications    Details   hydrocortisone (Anusol-HC) 2.5 % rectal cream Insert  into rectum four (4) times daily. , Normal, Disp-30 g, R-0      polyethylene glycol (Miralax) 17 gram/dose powder Take 17 g by mouth daily. 1 tablespoon with 8 oz of water daily, Normal, Disp-116 g, R-0      metroNIDAZOLE (FlagyL) 500 mg tablet Take 1 Tablet by mouth two (2) times a day for 7 days. , Normal, Disp-14 Tablet, R-0           CONTINUE these medications which have NOT CHANGED    Details   benzocaine-menthoL (Sore Throat, benzocaine-menth,) 6-10 mg lozg 1 Lozenge by Mucous Membrane route as needed for Pain., Normal, Disp-18 Each, R-0      phenyleph-min oil-petrolatum (Preparation H) 0.25-14-74.9 % rectal ointment by PeriANAL route two (2) times a day., Normal, Disp-1 Each, R-0      ibuprofen (MOTRIN) 800 mg tablet Take 1 Tablet by mouth every eight (8) hours as needed for Pain., Normal, Disp-30 Tablet, R-0      PNV no.106-iron-folate no6-dha 27.5 mg iron- 1 mg cap Take 1 Tab by mouth daily. , Historical Med           2. Follow-up Information       Follow up With Specialties Details Why Contact Info    Your OB. GYN  Schedule an appointment as soon as possible for a visit in 3 days      Annika Frey MD Internal Medicine Physician  As needed [address] [place] [address] [phone]      [facility] EMERGENCY DEPT Emergency Medicine  As needed, If symptoms worsen [address] [place] [address]  [phone]          Return to ED if worse     Diagnosis     Clinical Impression: Acute bacterial vaginosis with pelvic pain, acute hemorrhoid

## 2022-09-13 LAB
BACTERIA SPEC CULT: NORMAL
C TRACH DNA SPEC QL NAA+PROBE: NEGATIVE
N GONORRHOEA DNA SPEC QL NAA+PROBE: NEGATIVE
SAMPLE TYPE: NORMAL
SERVICE CMNT-IMP: NORMAL
SERVICE CMNT-IMP: NORMAL
SPECIMEN SOURCE: NORMAL

## 2022-09-19 ENCOUNTER — HOSPITAL ENCOUNTER (EMERGENCY)
Age: 27
Discharge: HOME | End: 2022-09-19
Attending: EMERGENCY MEDICINE
Payer: COMMERCIAL

## 2022-09-19 VITALS
DIASTOLIC BLOOD PRESSURE: 75 MMHG | OXYGEN SATURATION: 100 % | HEART RATE: 84 BPM | WEIGHT: 255 LBS | HEIGHT: 64 IN | BODY MASS INDEX: 43.54 KG/M2 | SYSTOLIC BLOOD PRESSURE: 124 MMHG | RESPIRATION RATE: 17 BRPM | TEMPERATURE: 98.2 F

## 2022-09-19 DIAGNOSIS — N90.89 VULVAR LESION: Primary | ICD-10-CM

## 2022-09-19 DIAGNOSIS — R33.8 ACUTE URINARY RETENTION: ICD-10-CM

## 2022-09-19 LAB
ALBUMIN SERPL-MCNC: 3.1 G/DL (ref 3.5–5)
ALBUMIN/GLOB SERPL: 0.7 {RATIO} (ref 1.1–2.2)
ALP SERPL-CCNC: 80 U/L (ref 45–117)
ALT SERPL-CCNC: 24 U/L (ref 12–78)
ANION GAP SERPL CALC-SCNC: 7 MMOL/L (ref 5–15)
APPEARANCE UR: CLEAR
AST SERPL-CCNC: 22 U/L (ref 15–37)
BACTERIA URNS QL MICRO: NEGATIVE /HPF
BASOPHILS # BLD: 0.1 K/UL (ref 0–0.1)
BASOPHILS NFR BLD: 1 % (ref 0–1)
BILIRUB SERPL-MCNC: 0.4 MG/DL (ref 0.2–1)
BILIRUB UR QL: NEGATIVE
BUN SERPL-MCNC: 9 MG/DL (ref 6–20)
BUN/CREAT SERPL: 13 (ref 12–20)
CALCIUM SERPL-MCNC: 9.1 MG/DL (ref 8.5–10.1)
CHLORIDE SERPL-SCNC: 105 MMOL/L (ref 97–108)
CO2 SERPL-SCNC: 26 MMOL/L (ref 21–32)
COLOR UR: NORMAL
CREAT SERPL-MCNC: 0.7 MG/DL (ref 0.55–1.02)
DIFFERENTIAL METHOD BLD: ABNORMAL
EOSINOPHIL # BLD: 0.1 K/UL (ref 0–0.4)
EOSINOPHIL NFR BLD: 1 % (ref 0–7)
EPITH CASTS URNS QL MICRO: NORMAL /LPF
ERYTHROCYTE [DISTWIDTH] IN BLOOD BY AUTOMATED COUNT: 13.6 % (ref 11.5–14.5)
GLOBULIN SER CALC-MCNC: 4.5 G/DL (ref 2–4)
GLUCOSE SERPL-MCNC: 99 MG/DL (ref 65–100)
GLUCOSE UR STRIP.AUTO-MCNC: NEGATIVE MG/DL
HCG UR QL: NEGATIVE
HCT VFR BLD AUTO: 38.2 % (ref 35–47)
HGB BLD-MCNC: 12.2 G/DL (ref 11.5–16)
HGB UR QL STRIP: NEGATIVE
HYALINE CASTS URNS QL MICRO: NORMAL /LPF (ref 0–2)
IMM GRANULOCYTES # BLD AUTO: 0 K/UL (ref 0–0.04)
IMM GRANULOCYTES NFR BLD AUTO: 0 % (ref 0–0.5)
KETONES UR QL STRIP.AUTO: NEGATIVE MG/DL
LEUKOCYTE ESTERASE UR QL STRIP.AUTO: NEGATIVE
LYMPHOCYTES # BLD: 3.1 K/UL (ref 0.8–3.5)
LYMPHOCYTES NFR BLD: 40 % (ref 12–49)
MCH RBC QN AUTO: 25.7 PG (ref 26–34)
MCHC RBC AUTO-ENTMCNC: 31.9 G/DL (ref 30–36.5)
MCV RBC AUTO: 80.6 FL (ref 80–99)
MONOCYTES # BLD: 0.5 K/UL (ref 0–1)
MONOCYTES NFR BLD: 6 % (ref 5–13)
NEUTS SEG # BLD: 3.9 K/UL (ref 1.8–8)
NEUTS SEG NFR BLD: 52 % (ref 32–75)
NITRITE UR QL STRIP.AUTO: NEGATIVE
NRBC # BLD: 0 K/UL (ref 0–0.01)
NRBC BLD-RTO: 0 PER 100 WBC
PH UR STRIP: 6 [PH] (ref 5–8)
PLATELET # BLD AUTO: 387 K/UL (ref 150–400)
PMV BLD AUTO: 9.3 FL (ref 8.9–12.9)
POTASSIUM SERPL-SCNC: 4 MMOL/L (ref 3.5–5.1)
PROT SERPL-MCNC: 7.6 G/DL (ref 6.4–8.2)
PROT UR STRIP-MCNC: NEGATIVE MG/DL
RBC # BLD AUTO: 4.74 M/UL (ref 3.8–5.2)
RBC #/AREA URNS HPF: NORMAL /HPF (ref 0–5)
RBC MORPH BLD: ABNORMAL
SODIUM SERPL-SCNC: 138 MMOL/L (ref 136–145)
SP GR UR REFRACTOMETRY: 1.03
UA: UC IF INDICATED,UAUC: NORMAL
UROBILINOGEN UR QL STRIP.AUTO: 1 EU/DL (ref 0.2–1)
WBC # BLD AUTO: 7.7 K/UL (ref 3.6–11)
WBC MORPH BLD: ABNORMAL
WBC URNS QL MICRO: NORMAL /HPF (ref 0–4)

## 2022-09-19 PROCEDURE — 80053 COMPREHEN METABOLIC PANEL: CPT

## 2022-09-19 PROCEDURE — 81025 URINE PREGNANCY TEST: CPT

## 2022-09-19 PROCEDURE — 85025 COMPLETE CBC W/AUTO DIFF WBC: CPT

## 2022-09-19 PROCEDURE — 99283 EMERGENCY DEPT VISIT LOW MDM: CPT

## 2022-09-19 PROCEDURE — 81001 URINALYSIS AUTO W/SCOPE: CPT

## 2022-09-19 PROCEDURE — 36415 COLL VENOUS BLD VENIPUNCTURE: CPT

## 2022-09-19 RX ORDER — VALACYCLOVIR HYDROCHLORIDE 1 G/1
1000 TABLET, FILM COATED ORAL 2 TIMES DAILY
Qty: 14 TABLET | Refills: 0 | Status: SHIPPED | OUTPATIENT
Start: 2022-09-19 | End: 2022-09-26

## 2022-09-19 NOTE — ED NOTES
Placed a leg bag on the patinet and went over the care instructions. Patient verbalized understanding and insturctions. Pt ambulated out of the ed with her dc papers in hand. Told her to follow up in 3 days with her gyn or some back to see us and have it removed if she cant get into her gyn.

## 2022-09-19 NOTE — ED PROVIDER NOTES
EMERGENCY DEPARTMENT HISTORY AND PHYSICAL EXAM      Date: 9/19/2022  Patient Name: Gen Wilcox    History of Presenting Illness     Chief Complaint   Patient presents with    Urinary Retention     States was treated on 9/12 for BV and constipation. Took meds as ordered and ow has not urinated since yesterday. Feels a lot of pelvic pressure and urgency but no urine. States 1 small BM. History Provided By: Patient    HPI: Gen Wilcox, 24 y.o. female presents to the ED with cc of dysuria. 26-year-old female presents emerged department with difficulty urinating. Seen in the ED on 9/12 and treated for bacterial vaginosis. Patient reports taking an antibiotic twice a day but does not recall the name. Followed up with her and GYN on Wednesday. Patient reports in the interval continues to note lower abdominal pressure. \"  Symptoms are constant, no alleviating or aggravating symptoms. I reports dysuria but no hematuria. Reports feeling \"swollen\" with note of lesions. These were swabbed for herpes at her annual GYN. Does report constipation was still passing flatus. Denies chest pain or shortness of breath. Denies nausea, vomiting or diarrhea. Denies back pain or saddle anesthesia. There are no other complaints, changes, or physical findings at this time. PCP: Renetta Colbert MD    No current facility-administered medications on file prior to encounter. Current Outpatient Medications on File Prior to Encounter   Medication Sig Dispense Refill    hydrocortisone (Anusol-HC) 2.5 % rectal cream Insert  into rectum four (4) times daily. 30 g 0    polyethylene glycol (Miralax) 17 gram/dose powder Take 17 g by mouth daily. 1 tablespoon with 8 oz of water daily 116 g 0    metroNIDAZOLE (FlagyL) 500 mg tablet Take 1 Tablet by mouth two (2) times a day for 7 days. 14 Tablet 0    benzocaine-menthoL (Sore Throat, benzocaine-menth,) 6-10 mg lozg 1 Lozenge by Mucous Membrane route as needed for Pain.  18 Each 0    phenyleph-min oil-petrolatum (Preparation H) 0.25-14-74.9 % rectal ointment by PeriANAL route two (2) times a day. 1 Each 0    ibuprofen (MOTRIN) 800 mg tablet Take 1 Tablet by mouth every eight (8) hours as needed for Pain. 30 Tablet 0    PNV no.106-iron-folate no6-dha 27.5 mg iron- 1 mg cap Take 1 Tab by mouth daily. Past History     Past Medical History:  Past Medical History:   Diagnosis Date    Abdominal pain, other specified site     Anemia     Keratosis pilaris 10/1/2009    Otitis media        Past Surgical History:  Past Surgical History:   Procedure Laterality Date    COLONOSCOPY N/A 9/17/2020    COLONOSCOPY/EGD performed by Viraj Edward MD at [address] [place] [address] ENDOSCOPY    HX ADENOIDECTOMY      HX GYN      HX HEENT      tubal/tonsils and adnoids    HX OTHER SURGICAL      tonsilectomy childhood    HX OTHER SURGICAL      left ovary cyst removal     HX TONSILLECTOMY      HX TYMPANOSTOMY         Family History:  Family History   Problem Relation Age of Onset    Asthma Mother     Stroke Father     No Known Problems Sister     Asthma Brother     No Known Problems Brother     No Known Problems Sister        Social History:  Social History     Tobacco Use    Smoking status: Never    Smokeless tobacco: Never   Vaping Use    Vaping Use: Never used   Substance Use Topics    Alcohol use: Not Currently    Drug use: No       Allergies:  No Known Allergies      Review of Systems   Review of Systems   Constitutional:  Negative for activity change, chills and fever. HENT:  Negative for facial swelling and voice change. Eyes:  Negative for redness. Respiratory:  Negative for cough, shortness of breath and wheezing. Cardiovascular:  Negative for chest pain and leg swelling. Gastrointestinal:  Positive for abdominal pain and constipation. Negative for diarrhea, nausea and vomiting. Genitourinary:  Positive for difficulty urinating, dysuria and genital sores. Negative for decreased urine volume. Musculoskeletal:  Negative for gait problem. Skin:  Negative for pallor and rash. Neurological:  Negative for tremors and facial asymmetry. Psychiatric/Behavioral:  Negative for agitation. All other systems reviewed and are negative. Physical Exam   Physical Exam  Vitals and nursing note reviewed. Exam conducted with a chaperone present. Constitutional:       Comments: 53-year-old female, resting bed, no acute distress   HENT:      Head: Normocephalic and atraumatic. Cardiovascular:      Rate and Rhythm: Normal rate and regular rhythm. Heart sounds: No murmur heard. No friction rub. No gallop. Pulmonary:      Effort: Pulmonary effort is normal.      Breath sounds: Normal breath sounds. Abdominal:      Palpations: Abdomen is soft. Tenderness: There is abdominal tenderness (Bilateral lower abdomen). There is no guarding or rebound. Genitourinary:     Vagina: No vaginal discharge. Comments: There are ulcerated lesions along the inner labia  Musculoskeletal:         General: No swelling. Normal range of motion. Cervical back: Normal range of motion. Skin:     General: Skin is warm. Capillary Refill: Capillary refill takes less than 2 seconds. Neurological:      General: No focal deficit present. Mental Status: She is alert.    Psychiatric:         Mood and Affect: Mood normal.       Diagnostic Study Results     Labs -     Recent Results (from the past 12 hour(s))   CBC WITH AUTOMATED DIFF    Collection Time: 09/19/22  7:10 AM   Result Value Ref Range    WBC 7.7 3.6 - 11.0 K/uL    RBC 4.74 3.80 - 5.20 M/uL    HGB 12.2 11.5 - 16.0 g/dL    HCT 38.2 35.0 - 47.0 %    MCV 80.6 80.0 - 99.0 FL    MCH 25.7 (L) 26.0 - 34.0 PG    MCHC 31.9 30.0 - 36.5 g/dL    RDW 13.6 11.5 - 14.5 %    PLATELET 644 208 - 183 K/uL    MPV 9.3 8.9 - 12.9 FL    NRBC 0.0 0  WBC    ABSOLUTE NRBC 0.00 0.00 - 0.01 K/uL    NEUTROPHILS 52 32 - 75 %    LYMPHOCYTES 40 12 - 49 %    MONOCYTES 6 5 - 13 %    EOSINOPHILS 1 0 - 7 %    BASOPHILS 1 0 - 1 %    IMMATURE GRANULOCYTES 0 0.0 - 0.5 %    ABS. NEUTROPHILS 3.9 1.8 - 8.0 K/UL    ABS. LYMPHOCYTES 3.1 0.8 - 3.5 K/UL    ABS. MONOCYTES 0.5 0.0 - 1.0 K/UL    ABS. EOSINOPHILS 0.1 0.0 - 0.4 K/UL    ABS. BASOPHILS 0.1 0.0 - 0.1 K/UL    ABS. IMM. GRANS. 0.0 0.00 - 0.04 K/UL    DF MANUAL      RBC COMMENTS NORMOCYTIC, NORMOCHROMIC      WBC COMMENTS REACTIVE LYMPHS     METABOLIC PANEL, COMPREHENSIVE    Collection Time: 09/19/22  7:10 AM   Result Value Ref Range    Sodium 138 136 - 145 mmol/L    Potassium 4.0 3.5 - 5.1 mmol/L    Chloride 105 97 - 108 mmol/L    CO2 26 21 - 32 mmol/L    Anion gap 7 5 - 15 mmol/L    Glucose 99 65 - 100 mg/dL    BUN 9 6 - 20 MG/DL    Creatinine 0.70 0.55 - 1.02 MG/DL    BUN/Creatinine ratio 13 12 - 20      GFR est AA >60 >60 ml/min/1.73m2    GFR est non-AA >60 >60 ml/min/1.73m2    Calcium 9.1 8.5 - 10.1 MG/DL    Bilirubin, total 0.4 0.2 - 1.0 MG/DL    ALT (SGPT) 24 12 - 78 U/L    AST (SGOT) 22 15 - 37 U/L    Alk.  phosphatase 80 45 - 117 U/L    Protein, total 7.6 6.4 - 8.2 g/dL    Albumin 3.1 (L) 3.5 - 5.0 g/dL    Globulin 4.5 (H) 2.0 - 4.0 g/dL    A-G Ratio 0.7 (L) 1.1 - 2.2     URINALYSIS W/ REFLEX CULTURE    Collection Time: 09/19/22  8:19 AM    Specimen: Urine   Result Value Ref Range    Color YELLOW/STRAW      Appearance CLEAR CLEAR      Specific gravity 1.026      pH (UA) 6.0 5.0 - 8.0      Protein Negative NEG mg/dL    Glucose Negative NEG mg/dL    Ketone Negative NEG mg/dL    Bilirubin Negative NEG      Blood Negative NEG      Urobilinogen 1.0 0.2 - 1.0 EU/dL    Nitrites Negative NEG      Leukocyte Esterase Negative NEG      UA:UC IF INDICATED CULTURE NOT INDICATED BY UA RESULT      WBC 0-4 0 - 4 /hpf    RBC 0-5 0 - 5 /hpf    Epithelial cells FEW FEW /lpf    Bacteria Negative NEG /hpf    Hyaline cast 0-2 0 - 2 /lpf   HCG URINE, QL. - POC    Collection Time: 09/19/22  8:31 AM   Result Value Ref Range    Pregnancy test,urine (POC) Negative NEG         Radiologic Studies -   No orders to display     CT Results  (Last 48 hours)      None          CXR Results  (Last 48 hours)      None            Medical Decision Making   I am the first provider for this patient. I reviewed the vital signs, available nursing notes, past medical history, past surgical history, family history and social history. Vital Signs-Reviewed the patient's vital signs. Patient Vitals for the past 12 hrs:   Temp Pulse Resp BP SpO2   09/19/22 0657 98.2 °F (36.8 °C) 84 17 124/75 100 %     Records Reviewed: Nursing Notes and Old Medical Records    Provider Notes (Medical Decision Making):     24 YOF presents to the ED with a chief complaint of dysuria and difficulty urinating. Vital signs unremarkable. Abdominal exam benign. Patient does report interval development of vulvar lesions, I will check external exam by, patient had pelvic GYN 4 days ago. Presumably she should be treated for bacterial vaginosis. Will rule out urinary retention with bladder scan, if this is the case suspect difficulty urinating from external pain given lesions and detrusor spasm. No clinical evidence of cauda equina. Exam is not consistent with PID. ED Course:   Initial assessment performed. The patients presenting problems have been discussed, and they are in agreement with the care plan formulated and outlined with them. I have encouraged them to ask questions as they arise throughout their visit. ED Course as of 09/19/22 1310   Mon Sep 19, 2022   [id number] Patient's bladder scan showed greater than 800 cc, suspect detrusor spasm causing acute urinary retention, may be an element of outlet obstruction in setting of patient's pelvic pain. [MB]   [id number] Labs reviewed, CBC is unremarkable, normal renal function. Urinalysis without evidence of infection. Patient reassessed, after Orantes placed, repeat abdominal exam benign with no tenderness.   Patient reports he feels significantly improved. [MB]   [id number] Will discharge with valtrex for presumptive herpes simplex. GYN follow-up or ED follow-up for removal of catheter. [MB]      ED Course User Index  [MB] Chanda Kilgore MD     Updates as above, patient agreeable to plan. Return precautions provided. Audrey Wagner MD      Disposition:    Discharged    DISCHARGE PLAN:  1. Discharge Medication List as of 9/19/2022  9:00 AM        START taking these medications    Details   valACYclovir (VALTREX) 1 gram tablet Take 1 Tablet by mouth two (2) times a day for 7 days. , Normal, Disp-14 Tablet, R-0           CONTINUE these medications which have NOT CHANGED    Details   hydrocortisone (Anusol-HC) 2.5 % rectal cream Insert  into rectum four (4) times daily. , Normal, Disp-30 g, R-0      polyethylene glycol (Miralax) 17 gram/dose powder Take 17 g by mouth daily. 1 tablespoon with 8 oz of water daily, Normal, Disp-116 g, R-0      metroNIDAZOLE (FlagyL) 500 mg tablet Take 1 Tablet by mouth two (2) times a day for 7 days. , Normal, Disp-14 Tablet, R-0      benzocaine-menthoL (Sore Throat, benzocaine-menth,) 6-10 mg lozg 1 Lozenge by Mucous Membrane route as needed for Pain., Normal, Disp-18 Each, R-0      phenyleph-min oil-petrolatum (Preparation H) 0.25-14-74.9 % rectal ointment by PeriANAL route two (2) times a day., Normal, Disp-1 Each, R-0      ibuprofen (MOTRIN) 800 mg tablet Take 1 Tablet by mouth every eight (8) hours as needed for Pain., Normal, Disp-30 Tablet, R-0      PNV no.106-iron-folate no6-dha 27.5 mg iron- 1 mg cap Take 1 Tab by mouth daily. , Historical Med           2. Follow-up Information       Follow up With Specialties Details Why Contact Info    Annika Frey MD Internal Medicine Physician In 3 days  [address]  [address] [phone] [facility] EMERGENCY DEPT Emergency Medicine In 1 week for ferrer removal [address] [place] [address]  [phone]          3.   Return to ED if worse     Diagnosis     Clinical Impression:   1. Vulvar lesion    2. Acute urinary retention        Attestations:    Keerthi Dunbar MD        Please note that this dictation was completed with [employer], the computer voice recognition software. Quite often unanticipated grammatical, syntax, homophones, and other interpretive errors are inadvertently transcribed by the computer software. Please disregard these errors. Please excuse any errors that have escaped final proofreading. Thank you.

## 2022-09-19 NOTE — Clinical Note
[address]  [facility] EMERGENCY DEPT  [address] [place], [place] [id number] [phone]    Work/School Note    Date: 9/19/2022    To Whom It May concern:    Kit Diez was seen and treated today in the emergency room by the following provider(s):  Attending Provider: Jc Sethi MD.      Kit Diez is excused from work/school on 09/19/22 and 09/20/22. She is medically clear to return to work/school on 9/21/2022.        Sincerely,          Weston Beatty MD

## 2022-09-19 NOTE — DISCHARGE INSTRUCTIONS
You were seen in the emergency department for your symptoms. Please take the antiviral to help heal the lesions that are concerning for herpes. Please follow-up with your OB/gyn doctor. Your bladder was full and you are retaining urine. We will leave the catheter in for 3 to 5 days. This can be removed at the GYN or in the emergency department. Return for new or worsening symptoms including fevers or chills, chest pain, worsening symptoms anytime.

## 2022-09-22 ENCOUNTER — HOSPITAL ENCOUNTER (EMERGENCY)
Age: 27
Discharge: HOME | End: 2022-09-22
Attending: EMERGENCY MEDICINE
Payer: COMMERCIAL

## 2022-09-22 VITALS
RESPIRATION RATE: 16 BRPM | OXYGEN SATURATION: 97 % | HEIGHT: 64 IN | BODY MASS INDEX: 43.54 KG/M2 | WEIGHT: 255 LBS | HEART RATE: 89 BPM | TEMPERATURE: 98.1 F | SYSTOLIC BLOOD PRESSURE: 129 MMHG | DIASTOLIC BLOOD PRESSURE: 73 MMHG

## 2022-09-22 DIAGNOSIS — Z46.6 ENCOUNTER FOR FOLEY CATHETER REMOVAL: Primary | ICD-10-CM

## 2022-09-22 PROCEDURE — 99283 EMERGENCY DEPT VISIT LOW MDM: CPT

## 2022-09-22 RX ORDER — CEPHALEXIN 500 MG/1
500 CAPSULE ORAL 3 TIMES DAILY
Qty: 21 CAPSULE | Refills: 0 | Status: SHIPPED | OUTPATIENT
Start: 2022-09-22 | End: 2022-09-29

## 2022-09-22 RX ORDER — IBUPROFEN 600 MG/1
600 TABLET ORAL
Qty: 20 TABLET | Refills: 0 | Status: SHIPPED | OUTPATIENT
Start: 2022-09-22

## 2022-09-22 RX ORDER — IBUPROFEN 600 MG/1
600 TABLET ORAL
Status: DISCONTINUED | OUTPATIENT
Start: 2022-09-22 | End: 2022-09-22 | Stop reason: HOSPADM

## 2022-09-22 NOTE — ED PROVIDER NOTES
EMERGENCY DEPARTMENT HISTORY AND PHYSICAL EXAM      Date: 9/22/2022  Patient Name: Yakelin Carrillo    History of Presenting Illness     Chief Complaint   Patient presents with    Urinary Catheter Problem     Ambulatory into triage, cc of needing ferrer removed. Was placed on Monday due to genital swelling causing inability to urinate. Was told to come back today for removal       History Provided By: Patient    HPI: Yakelin Carrillo, 24 y.o. female presents to the ED requesting her urinary ferrer be discontinued. She had a ferrer catheter placed after not being able to urinate over the weekend. The patient states she was evaluated by her GYN for possible HSV, but has not been given the results. She believes this was the cause of her retention. The patient notes the painful lesions are still present, but have improved with the Valtrex. Additionally, the patient reports low back pain with associated nausea. She is worried for possible UTI. The patient's constipation has resolved after taking Miralax. THe patient denies fever, chills, CP, SOB, cough, abdominal pain, vomiting, diarrhea, and constipation. The patient denies chance of pregnancy. PMH: pt denies  Allergies: pt denies  SH: pt denies tobacco, alcohol, and illicit drug use. There are no other complaints, changes, or physical findings at this time. PCP: Otis Vallejo MD    No current facility-administered medications on file prior to encounter. Current Outpatient Medications on File Prior to Encounter   Medication Sig Dispense Refill    valACYclovir (VALTREX) 1 gram tablet Take 1 Tablet by mouth two (2) times a day for 7 days. 14 Tablet 0    hydrocortisone (Anusol-HC) 2.5 % rectal cream Insert  into rectum four (4) times daily. 30 g 0    polyethylene glycol (Miralax) 17 gram/dose powder Take 17 g by mouth daily.  1 tablespoon with 8 oz of water daily 116 g 0    benzocaine-menthoL (Sore Throat, benzocaine-menth,) 6-10 mg lozg 1 Lozenge by Mucous Membrane route as needed for Pain. 18 Each 0    phenyleph-min oil-petrolatum (Preparation H) 0.25-14-74.9 % rectal ointment by PeriANAL route two (2) times a day. 1 Each 0    ibuprofen (MOTRIN) 800 mg tablet Take 1 Tablet by mouth every eight (8) hours as needed for Pain. 30 Tablet 0    PNV no.106-iron-folate no6-dha 27.5 mg iron- 1 mg cap Take 1 Tab by mouth daily. Past History     Past Medical History:  Past Medical History:   Diagnosis Date    Abdominal pain, other specified site     Anemia     Keratosis pilaris 10/1/2009    Otitis media        Past Surgical History:  Past Surgical History:   Procedure Laterality Date    COLONOSCOPY N/A 9/17/2020    COLONOSCOPY/EGD performed by Carol Fraga MD at [facility] ENDOSCOPY    HX ADENOIDECTOMY      HX GYN      HX HEENT      tubal/tonsils and adnoids    HX OTHER SURGICAL      tonsilectomy childhood    HX OTHER SURGICAL      left ovary cyst removal     HX TONSILLECTOMY      HX TYMPANOSTOMY         Family History:  Family History   Problem Relation Age of Onset    Asthma Mother     Stroke Father     No Known Problems Sister     Asthma Brother     No Known Problems Brother     No Known Problems Sister        Social History:  Social History     Tobacco Use    Smoking status: Never    Smokeless tobacco: Never   Vaping Use    Vaping Use: Never used   Substance Use Topics    Alcohol use: Not Currently    Drug use: No       Allergies:  No Known Allergies      Review of Systems   Review of Systems   Constitutional: Negative. Negative for chills and fever. HENT: Negative. Negative for congestion. Respiratory: Negative. Negative for cough and shortness of breath. Cardiovascular: Negative. Negative for chest pain and palpitations. Gastrointestinal: Negative. Negative for abdominal pain, diarrhea, nausea and vomiting. Genitourinary:  Positive for genital sores and vaginal pain. Musculoskeletal:  Positive for back pain. Skin: Negative. Negative for color change. Allergic/Immunologic: Negative. Negative for environmental allergies and food allergies. Neurological: Negative. Negative for headaches. Psychiatric/Behavioral: Negative. Physical Exam   Physical Exam  Vitals reviewed. Constitutional:       General: She is not in acute distress. Appearance: She is well-developed. She is not diaphoretic. Comments: Pt appears well, awake and alert in NAD. HENT:      Head: Normocephalic and atraumatic. Right Ear: External ear normal.      Left Ear: External ear normal.   Eyes:      General:         Right eye: No discharge. Left eye: No discharge. Conjunctiva/sclera: Conjunctivae normal.   Cardiovascular:      Rate and Rhythm: Normal rate. Heart sounds: Normal heart sounds. Pulmonary:      Effort: Pulmonary effort is normal. No respiratory distress. Breath sounds: Normal breath sounds. No wheezing or rales. Abdominal:      General: Bowel sounds are normal.      Palpations: Abdomen is soft. Tenderness: There is no abdominal tenderness. There is no guarding. Musculoskeletal:         General: Tenderness present. Normal range of motion. Cervical back: Normal range of motion. Comments: Spine: No edema, erythema, step offs, or obvious bony deformity. No midline TTP, bilateral lumbar paraspinal TTP. No muscle spasms. Nl ROM. Skin:     General: Skin is warm and dry. Coloration: Skin is not pale. Findings: No erythema or rash. Neurological:      General: No focal deficit present. Mental Status: She is alert. Coordination: Coordination normal.      Comments: No focal neuro deficits. Psychiatric:         Behavior: Behavior normal.       Diagnostic Study Results     Labs -   No results found for this or any previous visit (from the past 12 hour(s)).     Radiologic Studies -   No orders to display     CT Results  (Last 48 hours)      None          CXR Results  (Last 48 hours)      None Medical Decision Making   I am the first provider for this patient. I reviewed the vital signs, available nursing notes, past medical history, past surgical history, family history and social history. Vital Signs-Reviewed the patient's vital signs. Patient Vitals for the past 12 hrs:   Temp Pulse Resp BP SpO2   09/22/22 0839 98.1 °F (36.7 °C) 89 16 129/73 97 %         Records Reviewed: Old Medical Records    Provider Notes (Medical Decision Making): The patient is a 26yo F with no significant PMH presents to the ED for ferrer catheter removal. The patient does not wish to stay to assure retention has improved and to rule out UTI. Will discharge with antibiotics and advised for follow up with GYN regarding results and possible UTI. Advised to return to the ED if retention symptoms remained or returned. Patient is in agreement. ED Course:   Initial assessment performed. The patients presenting problems have been discussed, and they are in agreement with the care plan formulated and outlined with them. I have encouraged them to ask questions as they arise throughout their visit. 9:34 AM    I informed the pt that she needed further workup for adequate evaluation for her urinary incontinence, and that by refusing the above, she is at risk for  infection, sepsis, sudden death, further deterioration. She is awake, alert, and she understands her condition and the risks involved in leaving. The patient has not received any potentially altering drugs while in the ED today. She is clinically aware of her surroundings and able to ask appropriate questions, the patient's nurse was present to confirm she is not clinically intoxicated and able to make medical decisions. She verbalized knowing the risks and understood it was recommended that she stay and could also return at any time.  She was provided with warnings regarding worsening of her condition and were provided instructions to follow up with Jamel Marinelli MD tomorrow or return to the Emergency Room as soon as possible. This discussion was witnessed by nurse Aleida Kee. REBECCA Quijano      Disposition:  9:38 AM    DC-The patient was given verbal follow-up and return instructions      DISCHARGE PLAN:  1. Discharge Medication List as of 9/22/2022  9:27 AM        START taking these medications    Details   cephALEXin (Keflex) 500 mg capsule Take 1 Capsule by mouth three (3) times daily for 7 days. , Normal, Disp-21 Capsule, R-0           CONTINUE these medications which have CHANGED    Details   ibuprofen (MOTRIN) 600 mg tablet Take 1 Tablet by mouth every six (6) hours as needed for Pain., Normal, Disp-20 Tablet, R-0           CONTINUE these medications which have NOT CHANGED    Details   valACYclovir (VALTREX) 1 gram tablet Take 1 Tablet by mouth two (2) times a day for 7 days. , Normal, Disp-14 Tablet, R-0      hydrocortisone (Anusol-HC) 2.5 % rectal cream Insert  into rectum four (4) times daily. , Normal, Disp-30 g, R-0      polyethylene glycol (Miralax) 17 gram/dose powder Take 17 g by mouth daily. 1 tablespoon with 8 oz of water daily, Normal, Disp-116 g, R-0      benzocaine-menthoL (Sore Throat, benzocaine-menth,) 6-10 mg lozg 1 Lozenge by Mucous Membrane route as needed for Pain., Normal, Disp-18 Each, R-0      phenyleph-min oil-petrolatum (Preparation H) 0.25-14-74.9 % rectal ointment by PeriANAL route two (2) times a day., Normal, Disp-1 Each, R-0      PNV no.106-iron-folate no6-dha 27.5 mg iron- 1 mg cap Take 1 Tab by mouth daily. , Historical Med           2. Follow-up Information    None       3. Return to ED if worse     Diagnosis     Clinical Impression:   1. Encounter for Orantes catheter removal        Attestations:    REBECCA Quijano        Please note that this dictation was completed with [email or web address], the [employer] voice recognition software.   Quite often unanticipated grammatical, syntax, homophones, and other interpretive errors are inadvertently transcribed by the computer software. Please disregard these errors. Please excuse any errors that have escaped final proofreading. Thank you.

## 2022-09-22 NOTE — DISCHARGE INSTRUCTIONS
Start Keflex and ibuprofen as needed for discomfort  Return for any new or worsening symptoms or if you are unable to urinate

## 2022-09-22 NOTE — ED NOTES
RN removed ferrer catheter per PA request. Pt attempted to give urine sample but was unsuccessful. Pt states that her ride is here and she has to leave, but if she cannot urinate then she will come back to ER. PA informed.

## 2022-10-04 ENCOUNTER — HOSPITAL ENCOUNTER (EMERGENCY)
Age: 27
Discharge: LEFT BEFORE DISCHARGE | End: 2022-10-04
Payer: COMMERCIAL

## 2022-10-04 VITALS
HEART RATE: 96 BPM | DIASTOLIC BLOOD PRESSURE: 65 MMHG | SYSTOLIC BLOOD PRESSURE: 115 MMHG | HEIGHT: 64 IN | WEIGHT: 253.75 LBS | RESPIRATION RATE: 14 BRPM | BODY MASS INDEX: 43.32 KG/M2 | OXYGEN SATURATION: 99 % | TEMPERATURE: 98.5 F

## 2022-10-04 LAB
APPEARANCE UR: ABNORMAL
BACTERIA URNS QL MICRO: ABNORMAL /HPF
BILIRUB UR QL: NEGATIVE
COLOR UR: ABNORMAL
EPITH CASTS URNS QL MICRO: ABNORMAL /LPF
GLUCOSE UR STRIP.AUTO-MCNC: NEGATIVE MG/DL
HCG UR QL: NEGATIVE
HGB UR QL STRIP: ABNORMAL
KETONES UR QL STRIP.AUTO: NEGATIVE MG/DL
LEUKOCYTE ESTERASE UR QL STRIP.AUTO: ABNORMAL
NITRITE UR QL STRIP.AUTO: POSITIVE
PH UR STRIP: 6.5 [PH] (ref 5–8)
PROT UR STRIP-MCNC: 30 MG/DL
RBC #/AREA URNS HPF: ABNORMAL /HPF (ref 0–5)
SP GR UR REFRACTOMETRY: 1.01
UA: UC IF INDICATED,UAUC: ABNORMAL
UROBILINOGEN UR QL STRIP.AUTO: 1 EU/DL (ref 0.2–1)
WBC URNS QL MICRO: ABNORMAL /HPF (ref 0–4)

## 2022-10-04 PROCEDURE — 75810000275 HC EMERGENCY DEPT VISIT NO LEVEL OF CARE

## 2022-10-04 PROCEDURE — 87086 URINE CULTURE/COLONY COUNT: CPT

## 2022-10-04 PROCEDURE — 87077 CULTURE AEROBIC IDENTIFY: CPT

## 2022-10-04 PROCEDURE — 81025 URINE PREGNANCY TEST: CPT

## 2022-10-04 PROCEDURE — 87186 SC STD MICRODIL/AGAR DIL: CPT

## 2022-10-04 PROCEDURE — 81001 URINALYSIS AUTO W/SCOPE: CPT

## 2022-10-05 ENCOUNTER — HOSPITAL ENCOUNTER (EMERGENCY)
Age: 27
Discharge: HOME | End: 2022-10-05
Attending: EMERGENCY MEDICINE
Payer: COMMERCIAL

## 2022-10-05 VITALS
SYSTOLIC BLOOD PRESSURE: 117 MMHG | OXYGEN SATURATION: 100 % | DIASTOLIC BLOOD PRESSURE: 60 MMHG | TEMPERATURE: 99.3 F | HEIGHT: 64 IN | HEART RATE: 90 BPM | BODY MASS INDEX: 42.57 KG/M2 | RESPIRATION RATE: 18 BRPM | WEIGHT: 249.34 LBS

## 2022-10-05 DIAGNOSIS — N39.0 ACUTE UTI: Primary | ICD-10-CM

## 2022-10-05 DIAGNOSIS — Z20.822 PERSON UNDER INVESTIGATION FOR COVID-19: ICD-10-CM

## 2022-10-05 LAB
APPEARANCE UR: ABNORMAL
BACTERIA URNS QL MICRO: ABNORMAL /HPF
BILIRUB UR QL: NEGATIVE
COLOR UR: ABNORMAL
EPITH CASTS URNS QL MICRO: ABNORMAL /LPF
GLUCOSE UR STRIP.AUTO-MCNC: NEGATIVE MG/DL
HCG UR QL: NEGATIVE
HGB UR QL STRIP: ABNORMAL
KETONES UR QL STRIP.AUTO: NEGATIVE MG/DL
LEUKOCYTE ESTERASE UR QL STRIP.AUTO: ABNORMAL
NITRITE UR QL STRIP.AUTO: POSITIVE
PH UR STRIP: 6.5 [PH] (ref 5–8)
PROT UR STRIP-MCNC: 30 MG/DL
RBC #/AREA URNS HPF: ABNORMAL /HPF (ref 0–5)
SARS-COV-2, XPLCVT: NOT DETECTED
SOURCE, COVRS: NORMAL
SP GR UR REFRACTOMETRY: 1.01
UA: UC IF INDICATED,UAUC: ABNORMAL
UROBILINOGEN UR QL STRIP.AUTO: 1 EU/DL (ref 0.2–1)
WBC URNS QL MICRO: >100 /HPF (ref 0–4)

## 2022-10-05 PROCEDURE — U0005 INFEC AGEN DETEC AMPLI PROBE: HCPCS

## 2022-10-05 PROCEDURE — 74011250637 HC RX REV CODE- 250/637: Performed by: PHYSICIAN ASSISTANT

## 2022-10-05 PROCEDURE — 81025 URINE PREGNANCY TEST: CPT

## 2022-10-05 PROCEDURE — 81001 URINALYSIS AUTO W/SCOPE: CPT

## 2022-10-05 PROCEDURE — 99283 EMERGENCY DEPT VISIT LOW MDM: CPT

## 2022-10-05 PROCEDURE — 87186 SC STD MICRODIL/AGAR DIL: CPT

## 2022-10-05 PROCEDURE — 87077 CULTURE AEROBIC IDENTIFY: CPT

## 2022-10-05 PROCEDURE — 87086 URINE CULTURE/COLONY COUNT: CPT

## 2022-10-05 RX ORDER — NAPROXEN 500 MG/1
500 TABLET ORAL 2 TIMES DAILY WITH MEALS
Qty: 20 TABLET | Refills: 0 | Status: SHIPPED | OUTPATIENT
Start: 2022-10-05

## 2022-10-05 RX ORDER — NAPROXEN 250 MG/1
500 TABLET ORAL
Status: COMPLETED | OUTPATIENT
Start: 2022-10-05 | End: 2022-10-05

## 2022-10-05 RX ORDER — CIPROFLOXACIN 500 MG/1
500 TABLET ORAL 2 TIMES DAILY
Qty: 14 TABLET | Refills: 0 | Status: SHIPPED | OUTPATIENT
Start: 2022-10-05 | End: 2022-10-12

## 2022-10-05 RX ADMIN — NAPROXEN 500 MG: 250 TABLET ORAL at 08:39

## 2022-10-05 NOTE — Clinical Note
[address]  [facility] EMERGENC[facility]  [address] [facility]  [place] [id number] [phone]    Work/School Note    Date: 10/5/2022    To Whom It May concern:    Prakash Potts was seen and treated today in the emergency room by the following provider(s):  Attending Provider: Amee Ferreira DO  Physician Assistant: [address]. Prakash Potts is excused from work/school on 10/5/2022 through 10/7/2022. She is medically clear to return to work/school on 10/8/2022.          Sincerely,          REBECCA Lopes

## 2022-10-05 NOTE — ED PROVIDER NOTES
EMERGENCY DEPARTMENT HISTORY AND PHYSICAL EXAM      Date: 10/5/2022  Patient Name: Aidan Johnson    History of Presenting Illness     Chief Complaint   Patient presents with    Back Pain     Pt ambulatory into triage with a cc of lower back pain x 1 day; pt states that the pain radiates to ribs; pt states she was lifting heavy boxes at work yesterday, but denies injury        History Provided By: Patient    HPI: Aidan Johnson, 24 y.o. female with past medical history of anemia, and additional as noted below, per patient and record review, presents  to the ED with cc of low back pain for the past day. States the pain is an aching pain to the low back. States she was lifting heavy boxes at work yesterday, but denies any known trauma or injuries. States she did have 1 episode that radiated up to the side of her rib cage, but denies chest pain or shortness of breath. States she was seen near the end of last month for pelvic pain and found to have BV and genital herpes. States she had been having some urinary symptoms at that time, and these have persisted. She does endorse associated dysuria, denies hematuria. Denies vaginal discharge or bleeding. States today she has had some runny nose, sinus congestion so she took a rapid COVID-19 test that was negative. Has not taken any medications or attempted any symptomatic management techniques prior to arrival.  Denies any bowel or bladder incontinence/retention, or saddle anesthesias. Denies any extremity numbness, weakness, or tingling. Denies any difficulty ambulating or changes in gait. No prior history of back surgeries. Denies night sweats or weight loss. Denies history of IV drug use. Fox fevers, neck pain or stiffness, dizziness, abdominal pain, N/V/D/ blood in  urine or stool, rashes or weakness. No additional exacerbating alleviating factors. No other complaints at this time.               There are no other complaints, changes, or physical findings at this time. PCP: Nellie White MD    Current Outpatient Medications   Medication Sig Dispense Refill    naproxen (NAPROSYN) 500 mg tablet Take 1 Tablet by mouth two (2) times daily (with meals). 20 Tablet 0    ciprofloxacin HCl (Cipro) 500 mg tablet Take 1 Tablet by mouth two (2) times a day for 7 days. 14 Tablet 0    ibuprofen (MOTRIN) 600 mg tablet Take 1 Tablet by mouth every six (6) hours as needed for Pain. 20 Tablet 0    hydrocortisone (Anusol-HC) 2.5 % rectal cream Insert  into rectum four (4) times daily. 30 g 0    polyethylene glycol (Miralax) 17 gram/dose powder Take 17 g by mouth daily. 1 tablespoon with 8 oz of water daily 116 g 0    benzocaine-menthoL (Sore Throat, benzocaine-menth,) 6-10 mg lozg 1 Lozenge by Mucous Membrane route as needed for Pain. 18 Each 0    phenyleph-min oil-petrolatum (Preparation H) 0.25-14-74.9 % rectal ointment by PeriANAL route two (2) times a day. 1 Each 0    PNV no.106-iron-folate no6-dha 27.5 mg iron- 1 mg cap Take 1 Tab by mouth daily.        Past History     Past Medical History:  Past Medical History:   Diagnosis Date    Abdominal pain, other specified site     Anemia     Keratosis pilaris 10/1/2009    Otitis media        Past Surgical History:  Past Surgical History:   Procedure Laterality Date    COLONOSCOPY N/A 9/17/2020    COLONOSCOPY/EGD performed by Modesto Gomez MD at [facility] ENDOSCOPY    HX ADENOIDECTOMY      HX GYN      HX HEENT      tubal/tonsils and adnoids    HX OTHER SURGICAL      tonsilectomy childhood    HX OTHER SURGICAL      left ovary cyst removal     HX TONSILLECTOMY      HX TYMPANOSTOMY         Family History:  Family History   Problem Relation Age of Onset    Asthma Mother     Stroke Father     No Known Problems Sister     Asthma Brother     No Known Problems Brother     No Known Problems Sister        Social History:  Social History     Tobacco Use    Smoking status: Never    Smokeless tobacco: Never   Vaping Use    Vaping Use: Never used   Substance Use Topics    Alcohol use: Not Currently    Drug use: No       Allergies:  No Known Allergies  Review of Systems   Review of Systems   Constitutional:  Negative for appetite change, chills and fever. HENT:  Positive for congestion. Eyes:  Negative for pain. Respiratory:  Positive for cough. Negative for shortness of breath. Cardiovascular:  Negative for chest pain. Gastrointestinal:  Negative for abdominal pain, constipation, diarrhea, nausea and vomiting. Genitourinary:  Positive for dysuria. Negative for decreased urine volume, difficulty urinating, frequency, hematuria, urgency, vaginal bleeding, vaginal discharge and vaginal pain. Musculoskeletal:  Positive for back pain and myalgias. Negative for neck pain and neck stiffness. Skin:  Negative for rash. Neurological:  Negative for syncope and headaches. All other systems reviewed and are negative. Physical Exam   Physical Exam  Vitals and nursing note reviewed. Constitutional:       General: She is not in acute distress. Appearance: Normal appearance. She is not ill-appearing, toxic-appearing or diaphoretic. Comments: 24 y.o. female   HENT:      Head: Normocephalic and atraumatic. Nose: Nose normal.      Mouth/Throat:      Mouth: Mucous membranes are moist.   Eyes:      Extraocular Movements: Extraocular movements intact. Conjunctiva/sclera: Conjunctivae normal.   Cardiovascular:      Rate and Rhythm: Normal rate and regular rhythm. Pulses: Normal pulses. Heart sounds: Normal heart sounds. No murmur heard. No friction rub. No gallop. Pulmonary:      Effort: Pulmonary effort is normal. No respiratory distress. Breath sounds: Normal breath sounds. No wheezing. Abdominal:      General: There is no distension. Palpations: Abdomen is soft. There is no mass. Tenderness: There is no abdominal tenderness. There is no right CVA tenderness, left CVA tenderness, guarding or rebound. Musculoskeletal:         General: Normal range of motion. Cervical back: Normal range of motion. Skin:     General: Skin is warm and dry. Neurological:      General: No focal deficit present. Mental Status: She is alert and oriented to person, place, and time. Psychiatric:         Mood and Affect: Mood normal.         Behavior: Behavior normal.     Diagnostic Study Results     Labs -     Recent Results (from the past 12 hour(s))   URINALYSIS W/ REFLEX CULTURE    Collection Time: 10/05/22  8:36 AM    Specimen: Urine   Result Value Ref Range    Color YELLOW/STRAW      Appearance TURBID (A) CLEAR      Specific gravity 1.014      pH (UA) 6.5 5.0 - 8.0      Protein 30 (A) NEG mg/dL    Glucose Negative NEG mg/dL    Ketone Negative NEG mg/dL    Bilirubin Negative NEG      Blood SMALL (A) NEG      Urobilinogen 1.0 0.2 - 1.0 EU/dL    Nitrites Positive (A) NEG      Leukocyte Esterase LARGE (A) NEG      WBC >100 (H) 0 - 4 /hpf    RBC 5-10 0 - 5 /hpf    Epithelial cells MANY (A) FEW /lpf    Bacteria 4+ (A) NEG /hpf    UA:UC IF INDICATED URINE CULTURE ORDERED (A) CNI     HCG URINE, QL. - POC    Collection Time: 10/05/22  8:36 AM   Result Value Ref Range    Pregnancy test,urine (POC) Negative NEG         Radiologic Studies -   No orders to display     CT Results  (Last 48 hours)      None          CXR Results  (Last 48 hours)      None          Medical Decision Making   I am the first provider for this patient. I reviewed the vital signs, available nursing notes, past medical history, past surgical history, family history and social history. Vital Signs-Reviewed the patient's vital signs.   Patient Vitals for the past 12 hrs:   Temp Pulse Resp BP SpO2   10/05/22 0923 -- 90 -- -- 100 %   10/05/22 0750 99.3 °F (37.4 °C) (!) 102 18 117/60 99 %           Records Reviewed: Nursing Notes, Old Medical Records, and Previous Laboratory Studies    Provider Notes (Medical Decision Making):   Patient presents ED for evaluation of low back pain as noted above. Urinalysis with evidence of UTI. Patient denies history of kidney stones. Patient was evaluated yesterday but was unable to be seen, compared with urinalysis today, low suspicion of infected stone. Shared decision-making form and care plan created together, patient prefers conservative evaluation management, defers CT/further imaging at this time. Will treat with Cipro. Urine culture sent. Given her congestion and cough, COVID-19 testing sent, discussed pending results and MyChart access. No evidence of emergent conditions requiring further evaluation/management acutely here at this time. Verbal return precautions advised. Patient verbalizes understanding and agreement of current plan of care. ED Course:   Initial assessment performed. The patients presenting problems have been discussed, and they are in agreement with the care plan formulated and outlined with them. I have encouraged them to ask questions as they arise throughout their visit. Disposition:  Discharge     PLAN:  1. Discharge Medication List as of 10/5/2022  9:23 AM        START taking these medications    Details   naproxen (NAPROSYN) 500 mg tablet Take 1 Tablet by mouth two (2) times daily (with meals). , Normal, Disp-20 Tablet, R-0      ciprofloxacin HCl (Cipro) 500 mg tablet Take 1 Tablet by mouth two (2) times a day for 7 days. , Normal, Disp-14 Tablet, R-0           CONTINUE these medications which have NOT CHANGED    Details   ibuprofen (MOTRIN) 600 mg tablet Take 1 Tablet by mouth every six (6) hours as needed for Pain., Normal, Disp-20 Tablet, R-0      hydrocortisone (Anusol-HC) 2.5 % rectal cream Insert  into rectum four (4) times daily. , Normal, Disp-30 g, R-0      polyethylene glycol (Miralax) 17 gram/dose powder Take 17 g by mouth daily.  1 tablespoon with 8 oz of water daily, Normal, Disp-116 g, R-0      benzocaine-menthoL (Sore Throat, benzocaine-menth,) 6-10 mg lozg 1 Lozenge by Mucous Membrane route as needed for Pain., Normal, Disp-18 Each, R-0      phenyleph-min oil-petrolatum (Preparation H) 0.25-14-74.9 % rectal ointment by PeriANAL route two (2) times a day., Normal, Disp-1 Each, R-0      PNV no.106-iron-folate no6-dha 27.5 mg iron- 1 mg cap Take 1 Tab by mouth daily. , Historical Med           2. Follow-up Information       Follow up With Specialties Details Why Contact Info    [facility] EMERGENCY DEPT Emergency Medicine  As needed, If symptoms worsen [address] [facility] [address]    Cari Olvera MD Internal Medicine Physician In 1 week  [address] [facility]  [address] [place] [address]  [phone]            Return to ED if worse     Diagnosis     Clinical Impression:   1. Acute UTI    2.  Person under investigation for COVID-19

## 2022-10-05 NOTE — Clinical Note
[address]  [facility] EMERGENCY DEPT  [address] [facility]  [place] [address] [phone]    Work/School Note    Date: 10/5/2022     To Whom It May concern:    Мария Head was evaluated by the following provider(s):  Attending Provider: Martir Mcmahon DO  Physician Assistant: Reynold Castle, [address] [place] [address] virus is suspected. Per the CDC guidelines we recommend home isolation until the following conditions are all met:    1. At least five days have passed since symptoms first appeared and/or had a close exposure,   2. After home isolation for five days, wearing a mask around others for the next five days,  3. At least 24 have passed since last fever without the use of fever-reducing medications and  4.  Symptoms (eg cough, shortness of breath) have improved      Sincerely,          REBECCA Thompson

## 2022-10-07 LAB
BACTERIA SPEC CULT: ABNORMAL
BACTERIA SPEC CULT: ABNORMAL
CC UR VC: ABNORMAL
CC UR VC: ABNORMAL
SERVICE CMNT-IMP: ABNORMAL
SERVICE CMNT-IMP: ABNORMAL

## 2023-04-19 ENCOUNTER — APPOINTMENT (OUTPATIENT)
Dept: ULTRASOUND IMAGING | Age: 28
End: 2023-04-19
Attending: EMERGENCY MEDICINE
Payer: COMMERCIAL

## 2023-04-19 ENCOUNTER — APPOINTMENT (OUTPATIENT)
Dept: CT IMAGING | Age: 28
End: 2023-04-19
Attending: EMERGENCY MEDICINE
Payer: COMMERCIAL

## 2023-04-19 ENCOUNTER — HOSPITAL ENCOUNTER (EMERGENCY)
Age: 28
Discharge: HOME | End: 2023-04-19
Attending: EMERGENCY MEDICINE
Payer: COMMERCIAL

## 2023-04-19 VITALS
TEMPERATURE: 98.4 F | DIASTOLIC BLOOD PRESSURE: 68 MMHG | HEART RATE: 90 BPM | HEIGHT: 67 IN | BODY MASS INDEX: 40.07 KG/M2 | WEIGHT: 255.29 LBS | RESPIRATION RATE: 14 BRPM | OXYGEN SATURATION: 99 % | SYSTOLIC BLOOD PRESSURE: 125 MMHG

## 2023-04-19 DIAGNOSIS — R10.9 ACUTE ABDOMINAL PAIN: Primary | ICD-10-CM

## 2023-04-19 DIAGNOSIS — G44.209 ACUTE NON INTRACTABLE TENSION-TYPE HEADACHE: ICD-10-CM

## 2023-04-19 DIAGNOSIS — J02.9 ACUTE PHARYNGITIS, UNSPECIFIED ETIOLOGY: ICD-10-CM

## 2023-04-19 LAB
ALBUMIN SERPL-MCNC: 2.8 G/DL (ref 3.5–5)
ALBUMIN/GLOB SERPL: 0.6 (ref 1.1–2.2)
ALP SERPL-CCNC: 74 U/L (ref 45–117)
ALT SERPL-CCNC: 20 U/L (ref 12–78)
ANION GAP SERPL CALC-SCNC: 4 MMOL/L (ref 5–15)
APPEARANCE UR: CLEAR
AST SERPL-CCNC: 13 U/L (ref 15–37)
BACTERIA URNS QL MICRO: ABNORMAL /HPF
BASOPHILS # BLD: 0.1 K/UL (ref 0–0.1)
BASOPHILS NFR BLD: 0 % (ref 0–1)
BILIRUB SERPL-MCNC: 0.4 MG/DL (ref 0.2–1)
BILIRUB UR QL: NEGATIVE
BUN SERPL-MCNC: 8 MG/DL (ref 6–20)
BUN/CREAT SERPL: 13 (ref 12–20)
CALCIUM SERPL-MCNC: 8.6 MG/DL (ref 8.5–10.1)
CHLORIDE SERPL-SCNC: 104 MMOL/L (ref 97–108)
CO2 SERPL-SCNC: 27 MMOL/L (ref 21–32)
COLOR UR: ABNORMAL
COMMENT, HOLDF: NORMAL
CREAT SERPL-MCNC: 0.63 MG/DL (ref 0.55–1.02)
DEPRECATED S PYO AG THROAT QL EIA: NEGATIVE
DIFFERENTIAL METHOD BLD: ABNORMAL
EOSINOPHIL # BLD: 0.1 K/UL (ref 0–0.4)
EOSINOPHIL NFR BLD: 1 % (ref 0–7)
EPITH CASTS URNS QL MICRO: ABNORMAL /LPF
ERYTHROCYTE [DISTWIDTH] IN BLOOD BY AUTOMATED COUNT: 13.9 % (ref 11.5–14.5)
GLOBULIN SER CALC-MCNC: 4.7 G/DL (ref 2–4)
GLUCOSE SERPL-MCNC: 89 MG/DL (ref 65–100)
GLUCOSE UR STRIP.AUTO-MCNC: NEGATIVE MG/DL
HCG SERPL QL: NEGATIVE
HCG UR QL: NEGATIVE
HCT VFR BLD AUTO: 36.3 % (ref 35–47)
HETEROPH AB BLD QL IA: NEGATIVE
HGB BLD-MCNC: 11.6 G/DL (ref 11.5–16)
HGB UR QL STRIP: NEGATIVE
HYALINE CASTS URNS QL MICRO: ABNORMAL /LPF (ref 0–2)
IMM GRANULOCYTES # BLD AUTO: 0 K/UL (ref 0–0.04)
IMM GRANULOCYTES NFR BLD AUTO: 0 % (ref 0–0.5)
KETONES UR QL STRIP.AUTO: ABNORMAL MG/DL
LEUKOCYTE ESTERASE UR QL STRIP.AUTO: ABNORMAL
LIPASE SERPL-CCNC: 69 U/L (ref 73–393)
LYMPHOCYTES # BLD: 1.7 K/UL (ref 0.8–3.5)
LYMPHOCYTES NFR BLD: 12 % (ref 12–49)
MCH RBC QN AUTO: 26.8 PG (ref 26–34)
MCHC RBC AUTO-ENTMCNC: 32 G/DL (ref 30–36.5)
MCV RBC AUTO: 83.8 FL (ref 80–99)
MONOCYTES # BLD: 0.6 K/UL (ref 0–1)
MONOCYTES NFR BLD: 4 % (ref 5–13)
NEUTS SEG # BLD: 12 K/UL (ref 1.8–8)
NEUTS SEG NFR BLD: 83 % (ref 32–75)
NITRITE UR QL STRIP.AUTO: NEGATIVE
NRBC # BLD: 0 K/UL (ref 0–0.01)
NRBC BLD-RTO: 0 PER 100 WBC
PH UR STRIP: 6 (ref 5–8)
PLATELET # BLD AUTO: 348 K/UL (ref 150–400)
PMV BLD AUTO: 9.6 FL (ref 8.9–12.9)
POTASSIUM SERPL-SCNC: 3.6 MMOL/L (ref 3.5–5.1)
PROT SERPL-MCNC: 7.5 G/DL (ref 6.4–8.2)
PROT UR STRIP-MCNC: NEGATIVE MG/DL
RBC # BLD AUTO: 4.33 M/UL (ref 3.8–5.2)
RBC #/AREA URNS HPF: ABNORMAL /HPF (ref 0–5)
SAMPLES BEING HELD,HOLD: NORMAL
SODIUM SERPL-SCNC: 135 MMOL/L (ref 136–145)
SP GR UR REFRACTOMETRY: 1.02
UA: UC IF INDICATED,UAUC: ABNORMAL
UROBILINOGEN UR QL STRIP.AUTO: 1 EU/DL (ref 0.2–1)
WBC # BLD AUTO: 14.5 K/UL (ref 3.6–11)
WBC URNS QL MICRO: ABNORMAL /HPF (ref 0–4)

## 2023-04-19 PROCEDURE — 74011000250 HC RX REV CODE- 250: Performed by: EMERGENCY MEDICINE

## 2023-04-19 PROCEDURE — 99285 EMERGENCY DEPT VISIT HI MDM: CPT

## 2023-04-19 PROCEDURE — 85025 COMPLETE CBC W/AUTO DIFF WBC: CPT

## 2023-04-19 PROCEDURE — 81025 URINE PREGNANCY TEST: CPT

## 2023-04-19 PROCEDURE — 74177 CT ABD & PELVIS W/CONTRAST: CPT

## 2023-04-19 PROCEDURE — 74011000636 HC RX REV CODE- 636: Performed by: EMERGENCY MEDICINE

## 2023-04-19 PROCEDURE — 96375 TX/PRO/DX INJ NEW DRUG ADDON: CPT

## 2023-04-19 PROCEDURE — 96376 TX/PRO/DX INJ SAME DRUG ADON: CPT

## 2023-04-19 PROCEDURE — 80053 COMPREHEN METABOLIC PANEL: CPT

## 2023-04-19 PROCEDURE — 87880 STREP A ASSAY W/OPTIC: CPT

## 2023-04-19 PROCEDURE — 87070 CULTURE OTHR SPECIMN AEROBIC: CPT

## 2023-04-19 PROCEDURE — 36415 COLL VENOUS BLD VENIPUNCTURE: CPT

## 2023-04-19 PROCEDURE — 70450 CT HEAD/BRAIN W/O DYE: CPT

## 2023-04-19 PROCEDURE — 96374 THER/PROPH/DIAG INJ IV PUSH: CPT

## 2023-04-19 PROCEDURE — 86308 HETEROPHILE ANTIBODY SCREEN: CPT

## 2023-04-19 PROCEDURE — 83690 ASSAY OF LIPASE: CPT

## 2023-04-19 PROCEDURE — 81001 URINALYSIS AUTO W/SCOPE: CPT

## 2023-04-19 PROCEDURE — 84703 CHORIONIC GONADOTROPIN ASSAY: CPT

## 2023-04-19 PROCEDURE — 76705 ECHO EXAM OF ABDOMEN: CPT

## 2023-04-19 PROCEDURE — 74011250636 HC RX REV CODE- 250/636: Performed by: EMERGENCY MEDICINE

## 2023-04-19 RX ORDER — DICYCLOMINE HYDROCHLORIDE 20 MG/1
20 TABLET ORAL
Qty: 20 TABLET | Refills: 0 | Status: SHIPPED | OUTPATIENT
Start: 2023-04-19

## 2023-04-19 RX ORDER — ONDANSETRON 2 MG/ML
4 INJECTION INTRAMUSCULAR; INTRAVENOUS
Status: COMPLETED | OUTPATIENT
Start: 2023-04-19 | End: 2023-04-19

## 2023-04-19 RX ORDER — LIDOCAINE HYDROCHLORIDE 20 MG/ML
15 SOLUTION OROPHARYNGEAL
Status: COMPLETED | OUTPATIENT
Start: 2023-04-19 | End: 2023-04-19

## 2023-04-19 RX ORDER — MORPHINE SULFATE 2 MG/ML
4 INJECTION, SOLUTION INTRAMUSCULAR; INTRAVENOUS
Status: COMPLETED | OUTPATIENT
Start: 2023-04-19 | End: 2023-04-19

## 2023-04-19 RX ORDER — ONDANSETRON 4 MG/1
4 TABLET, FILM COATED ORAL
Qty: 20 TABLET | Refills: 0 | Status: SHIPPED | OUTPATIENT
Start: 2023-04-19

## 2023-04-19 RX ORDER — FAMOTIDINE 20 MG/1
20 TABLET, FILM COATED ORAL 2 TIMES DAILY
Qty: 20 TABLET | Refills: 0 | Status: SHIPPED | OUTPATIENT
Start: 2023-04-19

## 2023-04-19 RX ADMIN — LIDOCAINE HYDROCHLORIDE 15 ML: 20 SOLUTION ORAL at 11:09

## 2023-04-19 RX ADMIN — IOPAMIDOL 100 ML: 755 INJECTION, SOLUTION INTRAVENOUS at 13:27

## 2023-04-19 RX ADMIN — ONDANSETRON 4 MG: 2 INJECTION INTRAMUSCULAR; INTRAVENOUS at 12:31

## 2023-04-19 RX ADMIN — FAMOTIDINE 20 MG: 10 INJECTION, SOLUTION INTRAVENOUS at 11:07

## 2023-04-19 RX ADMIN — MORPHINE SULFATE 4 MG: 2 INJECTION, SOLUTION INTRAMUSCULAR; INTRAVENOUS at 12:31

## 2023-04-19 RX ADMIN — ONDANSETRON 4 MG: 2 INJECTION INTRAMUSCULAR; INTRAVENOUS at 11:06

## 2023-04-19 NOTE — ED PROVIDER NOTES
[facility] EMERGENCY DEPT  EMERGENCY DEPARTMENT ENCOUNTER       Pt Name: Barney Colmenares  MRN: [id number]  [place] 1995  Date of evaluation: 4/19/2023  Provider: Duran Valera MD   PCP: Wilbur Preston MD  Note Started: 12:22 PM 4/19/23     CHIEF COMPLAINT       Chief Complaint   Patient presents with    Abdominal Pain     Ambulatory into triage cc of upper abd pain, headache, sore throat    Headache    Sore Throat               HISTORY OF PRESENT ILLNESS: 1 or more elements      History From: Patient, History limited by: none     Barney Colmenares is a 34 y.o. female with no significant history, presents with abdominal pain, headache and sore throat. Her symptoms started this morning. The abdominal pain involves upper abdomen and is constant. That is an 8 out of 10 in severity. She denies back pain, chest pain or shortness of breath. She has also had a headache off and on today which is a 5 out of 10 in severity. She denies posterior neck pain or fever. She has a sore throat which is a 7 out of 10 in severity. She denies diarrhea, dysuria currently, but does feel lightheaded. Please See MDM for Additional Details of the HPI/PMH  Nursing Notes were all reviewed and agreed with or any disagreements were addressed in the HPI. REVIEW OF SYSTEMS        Positives and Pertinent negatives as per HPI.     PAST HISTORY     Past Medical History:  Past Medical History:   Diagnosis Date    Abdominal pain, other specified site     Anemia     Keratosis pilaris 10/1/2009    Otitis media        Past Surgical History:  Past Surgical History:   Procedure Laterality Date    COLONOSCOPY N/A 9/17/2020    COLONOSCOPY/EGD performed by Hong Suarez MD at [facility] ENDOSCOPY    HX ADENOIDECTOMY      HX GYN      HX HEENT      tubal/tonsils and adnoids    HX OTHER SURGICAL      tonsilectomy childhood    HX OTHER SURGICAL      left ovary cyst removal     HX TONSILLECTOMY      HX TYMPANOSTOMY         Family History:  Family History   Problem Relation Age of Onset    Asthma Mother     Stroke Father     No Known Problems Sister     Asthma Brother     No Known Problems Brother     No Known Problems Sister        Social History:  Social History     Tobacco Use    Smoking status: Never    Smokeless tobacco: Never   Vaping Use    Vaping Use: Never used   Substance Use Topics    Alcohol use: Not Currently    Drug use: No       Allergies:  No Known Allergies    CURRENT MEDICATIONS      Previous Medications    BENZOCAINE-MENTHOL (SORE THROAT, BENZOCAINE-MENTH,) 6-10 MG LOZG    1 Lozenge by Mucous Membrane route as needed for Pain. HYDROCORTISONE (ANUSOL-HC) 2.5 % RECTAL CREAM    Insert  into rectum four (4) times daily. IBUPROFEN (MOTRIN) 600 MG TABLET    Take 1 Tablet by mouth every six (6) hours as needed for Pain. NAPROXEN (NAPROSYN) 500 MG TABLET    Take 1 Tablet by mouth two (2) times daily (with meals). PHENYLEPH-MIN OIL-PETROLATUM (PREPARATION H) 0.25-14-74.9 % RECTAL OINTMENT    by PeriANAL route two (2) times a day. PNV NO.106-IRON-FOLATE NO6-DHA 27.5 MG IRON- 1 MG CAP    Take 1 Tab by mouth daily. POLYETHYLENE GLYCOL (MIRALAX) 17 GRAM/DOSE POWDER    Take 17 g by mouth daily. 1 tablespoon with 8 oz of water daily       SCREENINGS               No data recorded         PHYSICAL EXAM      ED Triage Vitals [04/19/23 0953]   ED Encounter Vitals Group      /71      Pulse (Heart Rate) 100      Resp Rate 14      Temp 98.4 °F (36.9 °C)      Temp src       O2 Sat (%) 100 %      Weight 255 lb 4.7 oz      Height 5' 7\"        Physical Exam  Vitals and nursing note reviewed. Constitutional:       General: She is not in acute distress. Appearance: She is well-developed. HENT:      Head: Normocephalic. Eyes:      Extraocular Movements: Extraocular movements intact. Pupils: Pupils are equal, round, and reactive to light. Cardiovascular:      Rate and Rhythm: Normal rate and regular rhythm. Heart sounds: Normal heart sounds. Pulmonary:      Effort: Pulmonary effort is normal.      Breath sounds: Normal breath sounds. Abdominal:      General: Bowel sounds are normal.      Palpations: Abdomen is soft. Tenderness: There is abdominal tenderness in the right upper quadrant and epigastric area. Musculoskeletal:         General: Normal range of motion. Cervical back: Normal range of motion and neck supple. Skin:     General: Skin is warm and dry. Neurological:      General: No focal deficit present. Mental Status: She is alert and oriented to person, place, and time. Comments: Motor and sensory intact   Psychiatric:         Mood and Affect: Mood normal.         Behavior: Behavior normal.        DIAGNOSTIC RESULTS   LABS:     Recent Results (from the past 12 hour(s))   CBC WITH AUTOMATED DIFF    Collection Time: 04/19/23 10:35 AM   Result Value Ref Range    WBC 14.5 (H) 3.6 - 11.0 K/uL    RBC 4.33 3.80 - 5.20 M/uL    HGB 11.6 11.5 - 16.0 g/dL    HCT 36.3 35.0 - 47.0 %    MCV 83.8 80.0 - 99.0 FL    MCH 26.8 26.0 - 34.0 PG    MCHC 32.0 30.0 - 36.5 g/dL    RDW 13.9 11.5 - 14.5 %    PLATELET 938 907 - 603 K/uL    MPV 9.6 8.9 - 12.9 FL    NRBC 0.0 0  WBC    ABSOLUTE NRBC 0.00 0.00 - 0.01 K/uL    NEUTROPHILS 83 (H) 32 - 75 %    LYMPHOCYTES 12 12 - 49 %    MONOCYTES 4 (L) 5 - 13 %    EOSINOPHILS 1 0 - 7 %    BASOPHILS 0 0 - 1 %    IMMATURE GRANULOCYTES 0 0.0 - 0.5 %    ABS. NEUTROPHILS 12.0 (H) 1.8 - 8.0 K/UL    ABS. LYMPHOCYTES 1.7 0.8 - 3.5 K/UL    ABS. MONOCYTES 0.6 0.0 - 1.0 K/UL    ABS. EOSINOPHILS 0.1 0.0 - 0.4 K/UL    ABS. BASOPHILS 0.1 0.0 - 0.1 K/UL    ABS. IMM.  GRANS. 0.0 0.00 - 0.04 K/UL    DF AUTOMATED     METABOLIC PANEL, COMPREHENSIVE    Collection Time: 04/19/23 10:35 AM   Result Value Ref Range    Sodium 135 (L) 136 - 145 mmol/L    Potassium 3.6 3.5 - 5.1 mmol/L    Chloride 104 97 - 108 mmol/L    CO2 27 21 - 32 mmol/L    Anion gap 4 (L) 5 - 15 mmol/L    Glucose 89 65 - 100 mg/dL    BUN 8 6 - 20 MG/DL    Creatinine 0.63 0.55 - 1.02 MG/DL    BUN/Creatinine ratio 13 12 - 20      eGFR >60 >60 ml/min/1.73m2    Calcium 8.6 8.5 - 10.1 MG/DL    Bilirubin, total 0.4 0.2 - 1.0 MG/DL    ALT (SGPT) 20 12 - 78 U/L    AST (SGOT) 13 (L) 15 - 37 U/L    Alk. phosphatase 74 45 - 117 U/L    Protein, total 7.5 6.4 - 8.2 g/dL    Albumin 2.8 (L) 3.5 - 5.0 g/dL    Globulin 4.7 (H) 2.0 - 4.0 g/dL    A-G Ratio 0.6 (L) 1.1 - 2.2     LIPASE    Collection Time: 04/19/23 10:35 AM   Result Value Ref Range    Lipase 69 (L) 73 - 393 U/L   HCG QL SERUM    Collection Time: 04/19/23 10:35 AM   Result Value Ref Range    HCG, Ql. Negative NEG     STREP AG SCREEN, GROUP A    Collection Time: 04/19/23 10:35 AM    Specimen: Swab; Throat   Result Value Ref Range    Group A Strep Ag ID Negative NEG     SAMPLES BEING HELD    Collection Time: 04/19/23 10:35 AM   Result Value Ref Range    SAMPLES BEING HELD PST     COMMENT        Add-on orders for these samples will be processed based on acceptable specimen integrity and analyte stability, which may vary by analyte.    MONONUCLEOSIS SCREEN    Collection Time: 04/19/23 10:35 AM   Result Value Ref Range    Mononucleosis screen Negative NEG     URINALYSIS W/ REFLEX CULTURE    Collection Time: 04/19/23 11:06 AM    Specimen: Urine   Result Value Ref Range    Color YELLOW/STRAW      Appearance CLEAR CLEAR      Specific gravity 1.023      pH (UA) 6.0 5.0 - 8.0      Protein Negative NEG mg/dL    Glucose Negative NEG mg/dL    Ketone TRACE (A) NEG mg/dL    Bilirubin Negative NEG      Blood Negative NEG      Urobilinogen 1.0 0.2 - 1.0 EU/dL    Nitrites Negative NEG      Leukocyte Esterase TRACE (A) NEG      UA:UC IF INDICATED CULTURE NOT INDICATED BY UA RESULT      WBC 0-4 0 - 4 /hpf    RBC 0-5 0 - 5 /hpf    Epithelial cells MANY (A) FEW /lpf    Bacteria 3+ (A) NEG /hpf    Hyaline cast 0-2 0 - 2 /lpf   HCG URINE, QL. - POC    Collection Time: 04/19/23 11:22 AM   Result Value Ref Range    Pregnancy test,urine (POC) Negative NEG          EKG: If performed, independent interpretation documented below in the MDM section     RADIOLOGY:  Non-plain film images such as CT, Ultrasound and MRI are read by the radiologist. Plain radiographic images are visualized and preliminarily interpreted by the ED Provider with the findings documented in the MDM section. Interpretation per the Radiologist below, if available at the time of this note:     CT HEAD WO CONT    Result Date: 4/19/2023  INDICATION: Headache EXAM: CT HEAD without contrast. TECHNIQUE: Unenhanced CT Head is performed. CT dose reduction was achieved through use of a standardized protocol tailored for this examination and automatic exposure control for dose modulation. FINDINGS: No acute infarct is seen. There is no apparent mass on unenhanced imaging. There is no bleed, shift, obstructive hydrocephalus or significant extra-axial fluid collection. Bone windows are unremarkable. No acute intracranial finding. CT ABD PELV W CONT    Result Date: 4/19/2023  EXAM: CT ABD PELV W CONT INDICATION: pain COMPARISON: None CONTRAST: 100 mL of Isovue-370. ORAL CONTRAST: None TECHNIQUE: Following the uneventful intravenous administration of contrast, thin axial images were obtained through the abdomen and pelvis. Coronal and sagittal reconstructions were generated. CT dose reduction was achieved through use of a standardized protocol tailored for this examination and automatic exposure control for dose modulation. FINDINGS: LOWER THORAX: No significant abnormality in the incidentally imaged lower chest. LIVER: No mass. BILIARY TREE: Gallbladder is within normal limits. CBD is not dilated. SPLEEN: within normal limits. PANCREAS: No mass or ductal dilatation. ADRENALS: Unremarkable. KIDNEYS: No mass, calculus, or hydronephrosis. STOMACH: Unremarkable. SMALL BOWEL: No dilatation or wall thickening. COLON: No dilatation or wall thickening. APPENDIX: Appears unremarkable. PERITONEUM: No ascites or pneumoperitoneum. RETROPERITONEUM: No lymphadenopathy or aortic aneurysm. REPRODUCTIVE ORGANS: No significant abnormalities. URINARY BLADDER: No mass or calculus. BONES: No destructive bone lesion. ABDOMINAL WALL: No mass or hernia. ADDITIONAL COMMENTS: N/A     No significant abnormalities. US ABD LTD    Result Date: 4/19/2023  ULTRASOUND OF ABDOMEN, 4/19/2023 11:43 AM INDICATION: COMPARISON: Ultrasound abdomen, 6/13/2016, 6/20/2014 and 6/2/2009 . TECHNIQUE: Multiplanar real-time ultrasonography of the abdomen using gray-scale imaging, supplemented by color and spectral Doppler. [employer] FINDINGS: Liver: Normal contour without visible focal lesions. The liver echotexture is normal. Antegrade flow in the portal vein with normal waveform. Gallbladder: No stones, wall thickening or pericholecystic fluid collections. No sonographic Turpin's sign. Biliary: No intrahepatic ductal dilatation. Common bile duct measures 0.3 cm. Pancreas: Query small area of diminished echogenicity in the neck of the pancreas, possibly technical, not noted by the technologist. Right kidney: Normal size, contour and echogenicity without masses, stones, perinephric fluid, or hydronephrosis. Right kidney measures 11.2 cm. Vascular: The proximal aorta and IVC are unremarkable. .    1. No visible abnormality in the gallbladder.         PROCEDURES   Unless otherwise noted below, none  Procedures     CRITICAL CARE TIME   0    EMERGENCY DEPARTMENT COURSE and DIFFERENTIAL DIAGNOSIS/MDM   Vitals:    Vitals:    04/19/23 0953 04/19/23 1140   BP: 138/71 125/68   Pulse: 100 90   Resp: 14    Temp: 98.4 °F (36.9 °C)    SpO2: 100% 99%   Weight: 115.8 kg (255 lb 4.7 oz)    Height: 5' 7\" (1.702 m)         Patient was given the following medications:  Medications   ondansetron (ZOFRAN) injection 4 mg (4 mg IntraVENous Given 4/19/23 1106)   lidocaine (XYLOCAINE) 2 % viscous solution 15 mL (15 mL Mouth/Throat Given 4/19/23 1109)   famotidine (PF) (PEPCID) 20 mg in 0.9% sodium chloride 10 mL injection (20 mg IntraVENous Given 4/19/23 1104)       Medical Decision Making  I will obtain lab work, urinalysis, ultrasound of the abdomen, will consider CT abdomen/pelvis if needed, and head CT if patient's, symptoms do not improve    Amount and/or Complexity of Data Reviewed  Labs: ordered. Decision-making details documented in ED Course. Radiology: ordered. Decision-making details documented in ED Course. Risk  Prescription drug management. FINAL IMPRESSION     1. Acute abdominal pain    2. Acute non intractable tension-type headache    3. Acute pharyngitis, unspecified etiology          DISPOSITION/PLAN   Richelle Velasquez's  results have been reviewed with her. She has been counseled regarding her diagnosis, treatment, and plan. She verbally conveys understanding and agreement of the signs, symptoms, diagnosis, treatment and prognosis and additionally agrees to follow up as discussed. She also agrees with the care-plan and conveys that all of her questions have been answered. I have also provided discharge instructions for her that include: educational information regarding their diagnosis and treatment, and list of reasons why they would want to return to the ED prior to their follow-up appointment, should her condition change. CLINICAL IMPRESSION    Discharge Note: The patient is stable for discharge home. The signs, symptoms, diagnosis, and discharge instructions have been discussed, understanding conveyed, and agreed upon. The patient is to follow up as recommended or return to ER should their symptoms worsen.       PATIENT REFERRED TO:  Follow-up Information       Follow up With Specialties Details Why Contact Info    Ayaka Harris MD Internal Medicine Physician  As needed [address] [facility]  [address] [place] [address]  [address] [facility]      Shane Pettit MD Gastroenterology  As needed [address] [place] [address]  [phone]      Jenny Sibley MD Neurology  As needed [address] [facility] [address] [facility]  [place] [phone]      [facility] EMERGENCY DEPT Emergency Medicine  If symptoms worsen [address] [place] [address]  [phone]              DISCHARGE MEDICATIONS:  Discharge Medication List as of 4/19/2023  2:46 PM        START taking these medications    Details   famotidine (Pepcid) 20 mg tablet Take 1 Tablet by mouth two (2) times a day., Normal, Disp-20 Tablet, R-0      dicyclomine (BENTYL) 20 mg tablet Take 1 Tablet by mouth every six (6) hours as needed for Abdominal Cramps., Normal, Disp-20 Tablet, R-0      ondansetron hcl (Zofran) 4 mg tablet Take 1 Tablet by mouth every eight (8) hours as needed for Nausea., Normal, Disp-20 Tablet, R-0           CONTINUE these medications which have NOT CHANGED    Details   naproxen (NAPROSYN) 500 mg tablet Take 1 Tablet by mouth two (2) times daily (with meals). , Normal, Disp-20 Tablet, R-0      ibuprofen (MOTRIN) 600 mg tablet Take 1 Tablet by mouth every six (6) hours as needed for Pain., Normal, Disp-20 Tablet, R-0      hydrocortisone (Anusol-HC) 2.5 % rectal cream Insert  into rectum four (4) times daily. , Normal, Disp-30 g, R-0      polyethylene glycol (Miralax) 17 gram/dose powder Take 17 g by mouth daily. 1 tablespoon with 8 oz of water daily, Normal, Disp-116 g, R-0      benzocaine-menthoL (Sore Throat, benzocaine-menth,) 6-10 mg lozg 1 Lozenge by Mucous Membrane route as needed for Pain., Normal, Disp-18 Each, R-0      phenyleph-min oil-petrolatum (Preparation H) 0.25-14-74.9 % rectal ointment by PeriANAL route two (2) times a day., Normal, Disp-1 Each, R-0      PNV no.106-iron-folate no6-dha 27.5 mg iron- 1 mg cap Take 1 Tab by mouth daily. , Historical Med               DISCONTINUED MEDICATIONS:  Current Discharge Medication List          I am the Primary Clinician of Record.    Yun Grant MD (electronically signed)    (Please note that parts of this dictation were completed with voice recognition software. Quite often unanticipated grammatical, syntax, homophones, and other interpretive errors are inadvertently transcribed by the computer software. Please disregards these errors.  Please excuse any errors that have escaped final proofreading.)

## 2023-04-19 NOTE — ED NOTES
Pt came in with cc of sore throat, abdominal pain and headache x2days with pain at 6/10, +nausea -vomiting, denies vision loss or blurriness, no numbness or tingling

## 2023-04-21 LAB
BACTERIA SPEC CULT: NORMAL
SERVICE CMNT-IMP: NORMAL

## 2023-09-20 ENCOUNTER — HOSPITAL ENCOUNTER (EMERGENCY)
Facility: HOSPITAL | Age: 28
Discharge: HOME | End: 2023-09-20
Payer: COMMERCIAL

## 2023-09-20 ENCOUNTER — APPOINTMENT (OUTPATIENT)
Facility: HOSPITAL | Age: 28
End: 2023-09-20
Payer: COMMERCIAL

## 2023-09-20 VITALS
RESPIRATION RATE: 18 BRPM | SYSTOLIC BLOOD PRESSURE: 125 MMHG | HEART RATE: 99 BPM | DIASTOLIC BLOOD PRESSURE: 69 MMHG | BODY MASS INDEX: 45.32 KG/M2 | TEMPERATURE: 98.4 F | WEIGHT: 265.43 LBS | HEIGHT: 64 IN | OXYGEN SATURATION: 100 %

## 2023-09-20 DIAGNOSIS — R42 LIGHTHEADEDNESS: ICD-10-CM

## 2023-09-20 DIAGNOSIS — R07.9 CHEST PAIN, UNSPECIFIED TYPE: Primary | ICD-10-CM

## 2023-09-20 DIAGNOSIS — N39.0 URINARY TRACT INFECTION WITHOUT HEMATURIA, SITE UNSPECIFIED: ICD-10-CM

## 2023-09-20 LAB
ALBUMIN SERPL-MCNC: 2.8 G/DL (ref 3.5–5)
ALBUMIN/GLOB SERPL: 0.6 (ref 1.1–2.2)
ALP SERPL-CCNC: 75 U/L (ref 45–117)
ALT SERPL-CCNC: 19 U/L (ref 12–78)
ANION GAP SERPL CALC-SCNC: 3 MMOL/L (ref 5–15)
APPEARANCE UR: CLEAR
AST SERPL-CCNC: 12 U/L (ref 15–37)
BACTERIA URNS QL MICRO: ABNORMAL /HPF
BASOPHILS # BLD: 0.1 K/UL (ref 0–0.1)
BASOPHILS NFR BLD: 0 % (ref 0–1)
BILIRUB SERPL-MCNC: 0.3 MG/DL (ref 0.2–1)
BILIRUB UR QL: NEGATIVE
BUN SERPL-MCNC: 11 MG/DL (ref 6–20)
BUN/CREAT SERPL: 13 (ref 12–20)
CALCIUM SERPL-MCNC: 8.5 MG/DL (ref 8.5–10.1)
CHLORIDE SERPL-SCNC: 104 MMOL/L (ref 97–108)
CO2 SERPL-SCNC: 31 MMOL/L (ref 21–32)
COLOR UR: ABNORMAL
CREAT SERPL-MCNC: 0.87 MG/DL (ref 0.55–1.02)
DIFFERENTIAL METHOD BLD: ABNORMAL
EOSINOPHIL # BLD: 0.3 K/UL (ref 0–0.4)
EOSINOPHIL NFR BLD: 2 % (ref 0–7)
EPITH CASTS URNS QL MICRO: ABNORMAL /LPF
ERYTHROCYTE [DISTWIDTH] IN BLOOD BY AUTOMATED COUNT: 13.2 % (ref 11.5–14.5)
FLUAV AG NPH QL IA: NEGATIVE
FLUBV AG NOSE QL IA: NEGATIVE
GLOBULIN SER CALC-MCNC: 4.5 G/DL (ref 2–4)
GLUCOSE SERPL-MCNC: 93 MG/DL (ref 65–100)
GLUCOSE UR STRIP.AUTO-MCNC: NEGATIVE MG/DL
HCG UR QL: NEGATIVE
HCT VFR BLD AUTO: 35.4 % (ref 35–47)
HGB BLD-MCNC: 11.6 G/DL (ref 11.5–16)
HGB UR QL STRIP: NEGATIVE
HYALINE CASTS URNS QL MICRO: ABNORMAL /LPF (ref 0–2)
IMM GRANULOCYTES # BLD AUTO: 0 K/UL (ref 0–0.04)
IMM GRANULOCYTES NFR BLD AUTO: 0 % (ref 0–0.5)
KETONES UR QL STRIP.AUTO: NEGATIVE MG/DL
LEUKOCYTE ESTERASE UR QL STRIP.AUTO: ABNORMAL
LYMPHOCYTES # BLD: 3 K/UL (ref 0.8–3.5)
LYMPHOCYTES NFR BLD: 22 % (ref 12–49)
MCH RBC QN AUTO: 27.8 PG (ref 26–34)
MCHC RBC AUTO-ENTMCNC: 32.8 G/DL (ref 30–36.5)
MCV RBC AUTO: 84.7 FL (ref 80–99)
MONOCYTES # BLD: 0.6 K/UL (ref 0–1)
MONOCYTES NFR BLD: 5 % (ref 5–13)
NEUTS SEG # BLD: 9.4 K/UL (ref 1.8–8)
NEUTS SEG NFR BLD: 71 % (ref 32–75)
NITRITE UR QL STRIP.AUTO: NEGATIVE
NRBC # BLD: 0 K/UL (ref 0–0.01)
NRBC BLD-RTO: 0 PER 100 WBC
PH UR STRIP: 6.5 (ref 5–8)
PLATELET # BLD AUTO: 443 K/UL (ref 150–400)
PMV BLD AUTO: 9.7 FL (ref 8.9–12.9)
POTASSIUM SERPL-SCNC: 4 MMOL/L (ref 3.5–5.1)
PROT SERPL-MCNC: 7.3 G/DL (ref 6.4–8.2)
PROT UR STRIP-MCNC: NEGATIVE MG/DL
RBC # BLD AUTO: 4.18 M/UL (ref 3.8–5.2)
RBC #/AREA URNS HPF: ABNORMAL /HPF (ref 0–5)
SARS-COV-2 RDRP RESP QL NAA+PROBE: NOT DETECTED
SODIUM SERPL-SCNC: 138 MMOL/L (ref 136–145)
SOURCE: NORMAL
SP GR UR REFRACTOMETRY: 1.03
TROPONIN I SERPL HS-MCNC: 4 NG/L (ref 0–51)
URINE CULTURE IF INDICATED: ABNORMAL
UROBILINOGEN UR QL STRIP.AUTO: 1 EU/DL (ref 0.2–1)
WBC # BLD AUTO: 13.4 K/UL (ref 3.6–11)
WBC URNS QL MICRO: ABNORMAL /HPF (ref 0–4)

## 2023-09-20 PROCEDURE — 87086 URINE CULTURE/COLONY COUNT: CPT

## 2023-09-20 PROCEDURE — 2580000003 HC RX 258

## 2023-09-20 PROCEDURE — 81001 URINALYSIS AUTO W/SCOPE: CPT

## 2023-09-20 PROCEDURE — 87804 INFLUENZA ASSAY W/OPTIC: CPT

## 2023-09-20 PROCEDURE — 87635 SARS-COV-2 COVID-19 AMP PRB: CPT

## 2023-09-20 PROCEDURE — 36415 COLL VENOUS BLD VENIPUNCTURE: CPT

## 2023-09-20 PROCEDURE — 99285 EMERGENCY DEPT VISIT HI MDM: CPT

## 2023-09-20 PROCEDURE — 6370000000 HC RX 637 (ALT 250 FOR IP)

## 2023-09-20 PROCEDURE — 84484 ASSAY OF TROPONIN QUANT: CPT

## 2023-09-20 PROCEDURE — 81025 URINE PREGNANCY TEST: CPT

## 2023-09-20 PROCEDURE — 93005 ELECTROCARDIOGRAM TRACING: CPT | Performed by: EMERGENCY MEDICINE

## 2023-09-20 PROCEDURE — 85025 COMPLETE CBC W/AUTO DIFF WBC: CPT

## 2023-09-20 PROCEDURE — 71046 X-RAY EXAM CHEST 2 VIEWS: CPT

## 2023-09-20 PROCEDURE — 80053 COMPREHEN METABOLIC PANEL: CPT

## 2023-09-20 PROCEDURE — 96360 HYDRATION IV INFUSION INIT: CPT

## 2023-09-20 RX ORDER — 0.9 % SODIUM CHLORIDE 0.9 %
1000 INTRAVENOUS SOLUTION INTRAVENOUS ONCE
Status: COMPLETED | OUTPATIENT
Start: 2023-09-20 | End: 2023-09-20

## 2023-09-20 RX ORDER — NITROFURANTOIN 25; 75 MG/1; MG/1
100 CAPSULE ORAL 2 TIMES DAILY
Qty: 14 CAPSULE | Refills: 0 | Status: SHIPPED | OUTPATIENT
Start: 2023-09-20 | End: 2023-09-27

## 2023-09-20 RX ORDER — ONDANSETRON 4 MG/1
4 TABLET, ORALLY DISINTEGRATING ORAL ONCE
Status: DISCONTINUED | OUTPATIENT
Start: 2023-09-20 | End: 2023-09-21 | Stop reason: HOSPADM

## 2023-09-20 RX ORDER — ACETAMINOPHEN 500 MG
1000 TABLET ORAL
Status: COMPLETED | OUTPATIENT
Start: 2023-09-20 | End: 2023-09-20

## 2023-09-20 RX ADMIN — ACETAMINOPHEN 1000 MG: 500 TABLET ORAL at 22:39

## 2023-09-20 RX ADMIN — SODIUM CHLORIDE 1000 ML: 9 INJECTION, SOLUTION INTRAVENOUS at 22:41

## 2023-09-20 RX ADMIN — ALUMINUM HYDROXIDE AND MAGNESIUM HYDROXIDE 20 ML: 200; 200 SUSPENSION ORAL at 22:44

## 2023-09-20 ASSESSMENT — PAIN - FUNCTIONAL ASSESSMENT: PAIN_FUNCTIONAL_ASSESSMENT: NONE - DENIES PAIN

## 2023-09-21 NOTE — ED PROVIDER NOTES
[facility] EMERGENCY DEPT  EMERGENCY DEPARTMENT ENCOUNTER       Pt Name: Jay January  MRN: [id number]  [address] [place] 1995  Date of evaluation: 9/20/2023  Provider: Rebeca Urbano PA-C   PCP: Noemy Larsen MD  Note Started: 8:22 PM EDT 9/20/23     CHIEF COMPLAINT       Chief Complaint   Patient presents with    Dizziness     Earlier today while driving pt had an episode of substernal non radiating chest pain and feeling lightheaded. Denies n/v        HISTORY OF PRESENT ILLNESS: 1 or more elements      History From: Patient  HPI Limitations: None     Lizabeth Fleming is a 34 y.o. female with no significant past medical history who presents complaining of chest pain that occurred earlier today followed by lightheadedness. Patient reports the chest pain for like a sharp stabbing 7-10 pain that lasted for a few minutes and resolved. It was associated with lightheadedness and nausea. She reports the chest pain has resolved, however she still feeling slightly lightheaded. She reports she had chicken nuggets and mac & cheese earlier today for lunch. She notes the chest pain started after this while she was driving. She does report history of GERD for which she has taken Tums in the past. No tums today. She denies fever, chills, vomiting, dizziness, extremity numbness, tremulousness, jaw pain, arm pain, exertional chest pain, headache, neck pain, current chest pain, abdominal pain, bowel/bladder changes. Nursing Notes were all reviewed and agreed with or any disagreements were addressed in the HPI. REVIEW OF SYSTEMS      Review of Systems     Positives and Pertinent negatives as per HPI.     PAST HISTORY     Past Medical History:  Past Medical History:   Diagnosis Date    Abdominal pain, other specified site     Anemia     Keratosis pilaris 10/1/2009    Otitis media        Past Surgical History:  Past Surgical History:   Procedure Laterality Date    ADENOIDECTOMY      COLONOSCOPY N/A 9/17/2020    COLONOSCOPY/EGD performed

## 2023-09-21 NOTE — ED NOTES
DC papers reviewed and in hand, pt verbalized understanding. Patient has no questions or concerns. Patient ambulatory, no acute distress noted.        Carley Palmer RN  09/20/23 7755

## 2023-09-22 LAB
BACTERIA SPEC CULT: NORMAL
CC UR VC: NORMAL
SERVICE CMNT-IMP: NORMAL

## 2023-09-23 LAB
EKG ATRIAL RATE: 80 BPM
EKG DIAGNOSIS: NORMAL
EKG P AXIS: 47 DEGREES
EKG P-R INTERVAL: 154 MS
EKG Q-T INTERVAL: 370 MS
EKG QRS DURATION: 80 MS
EKG QTC CALCULATION (BAZETT): 426 MS
EKG R AXIS: 15 DEGREES
EKG T AXIS: 29 DEGREES
EKG VENTRICULAR RATE: 80 BPM

## 2023-10-19 ENCOUNTER — HOSPITAL ENCOUNTER (EMERGENCY)
Facility: HOSPITAL | Age: 28
Discharge: HOME | End: 2023-10-19
Attending: EMERGENCY MEDICINE
Payer: COMMERCIAL

## 2023-10-19 VITALS
RESPIRATION RATE: 18 BRPM | BODY MASS INDEX: 45.32 KG/M2 | HEIGHT: 64 IN | SYSTOLIC BLOOD PRESSURE: 117 MMHG | WEIGHT: 265.43 LBS | HEART RATE: 98 BPM | OXYGEN SATURATION: 100 % | TEMPERATURE: 98.2 F | DIASTOLIC BLOOD PRESSURE: 79 MMHG

## 2023-10-19 DIAGNOSIS — J02.0 STREPTOCOCCAL SORE THROAT: ICD-10-CM

## 2023-10-19 DIAGNOSIS — N30.00 ACUTE CYSTITIS WITHOUT HEMATURIA: ICD-10-CM

## 2023-10-19 DIAGNOSIS — R30.0 DYSURIA: Primary | ICD-10-CM

## 2023-10-19 LAB
APPEARANCE UR: CLEAR
BACTERIA URNS QL MICRO: ABNORMAL /HPF
BILIRUB UR QL: NEGATIVE
C TRACH DNA SPEC QL NAA+PROBE: NEGATIVE
CLUE CELLS VAG QL WET PREP: NORMAL
COLOR UR: ABNORMAL
DEPRECATED S PYO AG THROAT QL EIA: NEGATIVE
EPITH CASTS URNS QL MICRO: ABNORMAL /LPF
GLUCOSE UR STRIP.AUTO-MCNC: NEGATIVE MG/DL
HCG UR QL: NEGATIVE
HGB UR QL STRIP: NEGATIVE
KETONES UR QL STRIP.AUTO: NEGATIVE MG/DL
KOH PREP SPEC: NORMAL
LEUKOCYTE ESTERASE UR QL STRIP.AUTO: ABNORMAL
N GONORRHOEA DNA SPEC QL NAA+PROBE: NEGATIVE
NITRITE UR QL STRIP.AUTO: NEGATIVE
PH UR STRIP: 6.5 (ref 5–8)
PROT UR STRIP-MCNC: ABNORMAL MG/DL
RBC #/AREA URNS HPF: ABNORMAL /HPF (ref 0–5)
SAMPLE TYPE: NORMAL
SERVICE CMNT-IMP: NORMAL
SERVICE CMNT-IMP: NORMAL
SP GR UR REFRACTOMETRY: 1.03
SPECIMEN SOURCE: NORMAL
T VAGINALIS VAG QL WET PREP: NORMAL
URINE CULTURE IF INDICATED: ABNORMAL
UROBILINOGEN UR QL STRIP.AUTO: 1 EU/DL (ref 0.2–1)
WBC URNS QL MICRO: ABNORMAL /HPF (ref 0–4)

## 2023-10-19 PROCEDURE — 87210 SMEAR WET MOUNT SALINE/INK: CPT

## 2023-10-19 PROCEDURE — 87491 CHLMYD TRACH DNA AMP PROBE: CPT

## 2023-10-19 PROCEDURE — 87880 STREP A ASSAY W/OPTIC: CPT

## 2023-10-19 PROCEDURE — 81001 URINALYSIS AUTO W/SCOPE: CPT

## 2023-10-19 PROCEDURE — 87070 CULTURE OTHR SPECIMN AEROBIC: CPT

## 2023-10-19 PROCEDURE — 6370000000 HC RX 637 (ALT 250 FOR IP): Performed by: EMERGENCY MEDICINE

## 2023-10-19 PROCEDURE — 99283 EMERGENCY DEPT VISIT LOW MDM: CPT

## 2023-10-19 PROCEDURE — 87591 N.GONORRHOEAE DNA AMP PROB: CPT

## 2023-10-19 PROCEDURE — 87086 URINE CULTURE/COLONY COUNT: CPT

## 2023-10-19 PROCEDURE — 6360000002 HC RX W HCPCS: Performed by: EMERGENCY MEDICINE

## 2023-10-19 PROCEDURE — 81025 URINE PREGNANCY TEST: CPT

## 2023-10-19 RX ORDER — CEPHALEXIN 500 MG/1
500 CAPSULE ORAL 3 TIMES DAILY
Qty: 30 CAPSULE | Refills: 0 | Status: SHIPPED | OUTPATIENT
Start: 2023-10-19 | End: 2023-10-29

## 2023-10-19 RX ORDER — LIDOCAINE HYDROCHLORIDE 20 MG/ML
15 SOLUTION OROPHARYNGEAL
Status: COMPLETED | OUTPATIENT
Start: 2023-10-19 | End: 2023-10-19

## 2023-10-19 RX ORDER — NAPROXEN 500 MG/1
500 TABLET ORAL 2 TIMES DAILY
Qty: 60 TABLET | Refills: 0 | Status: SHIPPED | OUTPATIENT
Start: 2023-10-19

## 2023-10-19 RX ORDER — DEXAMETHASONE SODIUM PHOSPHATE 10 MG/ML
10 INJECTION, SOLUTION INTRAMUSCULAR; INTRAVENOUS
Status: COMPLETED | OUTPATIENT
Start: 2023-10-19 | End: 2023-10-19

## 2023-10-19 RX ADMIN — DEXAMETHASONE SODIUM PHOSPHATE 10 MG: 10 INJECTION, SOLUTION INTRAMUSCULAR; INTRAVENOUS at 03:39

## 2023-10-19 RX ADMIN — LIDOCAINE HYDROCHLORIDE 15 ML: 20 SOLUTION ORAL at 03:39

## 2023-10-19 ASSESSMENT — PAIN DESCRIPTION - ORIENTATION: ORIENTATION: POSTERIOR

## 2023-10-19 ASSESSMENT — ENCOUNTER SYMPTOMS
ABDOMINAL PAIN: 0
SHORTNESS OF BREATH: 0
VOMITING: 0
DIARRHEA: 0
NAUSEA: 0

## 2023-10-19 ASSESSMENT — PAIN DESCRIPTION - DESCRIPTORS: DESCRIPTORS: BURNING

## 2023-10-19 ASSESSMENT — PAIN SCALES - GENERAL: PAINLEVEL_OUTOF10: 4

## 2023-10-19 ASSESSMENT — PAIN DESCRIPTION - LOCATION: LOCATION: THROAT

## 2023-10-19 NOTE — DISCHARGE INSTRUCTIONS
It was a pleasure taking care of you in our Emergency Department today. We know that when you come to [facility], you are entrusting us with your health, comfort, and safety. Our physicians and nurses honor that trust, and truly appreciate the opportunity to care for you and your loved ones. We also value your feedback. If you receive a survey about your Emergency Department experience today, please fill it out. We care about our patients' feedback, and we listen to what you have to say. Thank you!       Dr. Jake Francois MD.

## 2023-10-20 LAB
BACTERIA SPEC CULT: NORMAL
CC UR VC: NORMAL
SERVICE CMNT-IMP: NORMAL

## 2023-10-21 LAB
BACTERIA SPEC CULT: NORMAL
SERVICE CMNT-IMP: NORMAL

## 2023-11-16 ASSESSMENT — ENCOUNTER SYMPTOMS: SORE THROAT: 1

## 2024-02-16 ENCOUNTER — HOSPITAL ENCOUNTER (EMERGENCY)
Facility: HOSPITAL | Age: 29
Discharge: HOME | End: 2024-02-16
Payer: OTHER MISCELLANEOUS

## 2024-02-16 ENCOUNTER — APPOINTMENT (OUTPATIENT)
Facility: HOSPITAL | Age: 29
End: 2024-02-16
Payer: OTHER MISCELLANEOUS

## 2024-02-16 VITALS
BODY MASS INDEX: 45.9 KG/M2 | SYSTOLIC BLOOD PRESSURE: 139 MMHG | HEART RATE: 91 BPM | TEMPERATURE: 97.9 F | RESPIRATION RATE: 18 BRPM | WEIGHT: 267.42 LBS | DIASTOLIC BLOOD PRESSURE: 70 MMHG | OXYGEN SATURATION: 98 %

## 2024-02-16 DIAGNOSIS — M25.512 ACUTE PAIN OF LEFT SHOULDER: ICD-10-CM

## 2024-02-16 DIAGNOSIS — R10.84 GENERALIZED ABDOMINAL PAIN: ICD-10-CM

## 2024-02-16 DIAGNOSIS — T14.8XXA MUSCULOSKELETAL STRAIN: ICD-10-CM

## 2024-02-16 DIAGNOSIS — Z04.1 ENCOUNTER FOR EXAMINATION FOLLOWING MOTOR VEHICLE COLLISION (MVC): Primary | ICD-10-CM

## 2024-02-16 LAB
ALBUMIN SERPL-MCNC: 3.4 G/DL (ref 3.5–5)
ALBUMIN/GLOB SERPL: 0.7 (ref 1.1–2.2)
ALP SERPL-CCNC: 86 U/L (ref 45–117)
ALT SERPL-CCNC: 23 U/L (ref 12–78)
ANION GAP SERPL CALC-SCNC: 5 MMOL/L (ref 5–15)
APPEARANCE UR: ABNORMAL
AST SERPL-CCNC: 24 U/L (ref 15–37)
BACTERIA URNS QL MICRO: ABNORMAL /HPF
BASOPHILS # BLD: 0 K/UL (ref 0–0.1)
BASOPHILS NFR BLD: 0 % (ref 0–1)
BILIRUB SERPL-MCNC: 0.5 MG/DL (ref 0.2–1)
BILIRUB UR QL: NEGATIVE
BUN SERPL-MCNC: 8 MG/DL (ref 6–20)
BUN/CREAT SERPL: 11 (ref 12–20)
CALCIUM SERPL-MCNC: 9 MG/DL (ref 8.5–10.1)
CHLORIDE SERPL-SCNC: 103 MMOL/L (ref 97–108)
CO2 SERPL-SCNC: 28 MMOL/L (ref 21–32)
COLOR UR: ABNORMAL
CREAT SERPL-MCNC: 0.74 MG/DL (ref 0.55–1.02)
DIFFERENTIAL METHOD BLD: NORMAL
EOSINOPHIL # BLD: 0.2 K/UL (ref 0–0.4)
EOSINOPHIL NFR BLD: 2 % (ref 0–7)
EPITH CASTS URNS QL MICRO: ABNORMAL /LPF
ERYTHROCYTE [DISTWIDTH] IN BLOOD BY AUTOMATED COUNT: 13.5 % (ref 11.5–14.5)
GLOBULIN SER CALC-MCNC: 4.8 G/DL (ref 2–4)
GLUCOSE SERPL-MCNC: 87 MG/DL (ref 65–100)
GLUCOSE UR STRIP.AUTO-MCNC: NEGATIVE MG/DL
HCG UR QL: NEGATIVE
HCT VFR BLD AUTO: 40.9 % (ref 35–47)
HGB BLD-MCNC: 12.9 G/DL (ref 11.5–16)
HGB UR QL STRIP: NEGATIVE
IMM GRANULOCYTES # BLD AUTO: 0 K/UL (ref 0–0.04)
IMM GRANULOCYTES NFR BLD AUTO: 0 % (ref 0–0.5)
KETONES UR QL STRIP.AUTO: NEGATIVE MG/DL
LYMPHOCYTES # BLD: 2.5 K/UL (ref 0.8–3.5)
LYMPHOCYTES NFR BLD: 30 % (ref 12–49)
MCH RBC QN AUTO: 26.7 PG (ref 26–34)
MCHC RBC AUTO-ENTMCNC: 31.5 G/DL (ref 30–36.5)
MCV RBC AUTO: 84.7 FL (ref 80–99)
MONOCYTES # BLD: 0.4 K/UL (ref 0–1)
MONOCYTES NFR BLD: 5 % (ref 5–13)
NEUTS SEG # BLD: 5.2 K/UL (ref 1.8–8)
NEUTS SEG NFR BLD: 63 % (ref 32–75)
NITRITE UR QL STRIP.AUTO: NEGATIVE
NRBC # BLD: 0 K/UL (ref 0–0.01)
NRBC BLD-RTO: 0 PER 100 WBC
PH UR STRIP: 7.5 (ref 5–8)
PLATELET # BLD AUTO: 397 K/UL (ref 150–400)
PMV BLD AUTO: 9.3 FL (ref 8.9–12.9)
POTASSIUM SERPL-SCNC: 3.9 MMOL/L (ref 3.5–5.1)
PROT SERPL-MCNC: 8.2 G/DL (ref 6.4–8.2)
PROT UR STRIP-MCNC: ABNORMAL MG/DL
RBC # BLD AUTO: 4.83 M/UL (ref 3.8–5.2)
RBC #/AREA URNS HPF: ABNORMAL /HPF (ref 0–5)
SODIUM SERPL-SCNC: 136 MMOL/L (ref 136–145)
SP GR UR REFRACTOMETRY: 1.02
URINE CULTURE IF INDICATED: ABNORMAL
UROBILINOGEN UR QL STRIP.AUTO: 1 EU/DL (ref 0.2–1)
WBC # BLD AUTO: 8.3 K/UL (ref 3.6–11)
WBC URNS QL MICRO: ABNORMAL /HPF (ref 0–4)

## 2024-02-16 PROCEDURE — 96374 THER/PROPH/DIAG INJ IV PUSH: CPT

## 2024-02-16 PROCEDURE — 74177 CT ABD & PELVIS W/CONTRAST: CPT

## 2024-02-16 PROCEDURE — 80053 COMPREHEN METABOLIC PANEL: CPT

## 2024-02-16 PROCEDURE — 85025 COMPLETE CBC W/AUTO DIFF WBC: CPT

## 2024-02-16 PROCEDURE — 81025 URINE PREGNANCY TEST: CPT

## 2024-02-16 PROCEDURE — 73030 X-RAY EXAM OF SHOULDER: CPT

## 2024-02-16 PROCEDURE — 99285 EMERGENCY DEPT VISIT HI MDM: CPT

## 2024-02-16 PROCEDURE — 36415 COLL VENOUS BLD VENIPUNCTURE: CPT

## 2024-02-16 PROCEDURE — 81001 URINALYSIS AUTO W/SCOPE: CPT

## 2024-02-16 PROCEDURE — 6360000004 HC RX CONTRAST MEDICATION: Performed by: PHYSICIAN ASSISTANT

## 2024-02-16 PROCEDURE — 6360000002 HC RX W HCPCS: Performed by: PHYSICIAN ASSISTANT

## 2024-02-16 PROCEDURE — 6370000000 HC RX 637 (ALT 250 FOR IP): Performed by: PHYSICIAN ASSISTANT

## 2024-02-16 PROCEDURE — 87086 URINE CULTURE/COLONY COUNT: CPT

## 2024-02-16 RX ORDER — KETOROLAC TROMETHAMINE 30 MG/ML
15 INJECTION, SOLUTION INTRAMUSCULAR; INTRAVENOUS ONCE
Status: COMPLETED | OUTPATIENT
Start: 2024-02-16 | End: 2024-02-16

## 2024-02-16 RX ORDER — METHOCARBAMOL 750 MG/1
750 TABLET, FILM COATED ORAL 4 TIMES DAILY
Qty: 40 TABLET | Refills: 0 | Status: SHIPPED | OUTPATIENT
Start: 2024-02-16 | End: 2024-02-26

## 2024-02-16 RX ORDER — NAPROXEN 500 MG/1
500 TABLET ORAL 2 TIMES DAILY
Qty: 30 TABLET | Refills: 0 | Status: SHIPPED | OUTPATIENT
Start: 2024-02-16

## 2024-02-16 RX ORDER — METHOCARBAMOL 500 MG/1
1000 TABLET, FILM COATED ORAL
Status: COMPLETED | OUTPATIENT
Start: 2024-02-16 | End: 2024-02-16

## 2024-02-16 RX ADMIN — METHOCARBAMOL TABLETS 1000 MG: 500 TABLET, COATED ORAL at 12:44

## 2024-02-16 RX ADMIN — KETOROLAC TROMETHAMINE 15 MG: 30 INJECTION INTRAMUSCULAR; INTRAVENOUS at 10:24

## 2024-02-16 RX ADMIN — IOPAMIDOL 100 ML: 755 INJECTION, SOLUTION INTRAVENOUS at 11:30

## 2024-02-16 NOTE — DISCHARGE INSTRUCTIONS
MG/DL    Bun/Cre Ratio 11 (L) 12 - 20      Est, Glom Filt Rate >60 >60 ml/min/1.73m2    Calcium 9.0 8.5 - 10.1 MG/DL    Total Bilirubin 0.5 0.2 - 1.0 MG/DL    ALT 23 12 - 78 U/L    AST 24 15 - 37 U/L    Alk Phosphatase 86 45 - 117 U/L    Total Protein 8.2 6.4 - 8.2 g/dL    Albumin 3.4 (L) 3.5 - 5.0 g/dL    Globulin 4.8 (H) 2.0 - 4.0 g/dL    Albumin/Globulin Ratio 0.7 (L) 1.1 - 2.2     POC Pregnancy Urine Qual    Collection Time: 02/16/24 11:24 AM   Result Value Ref Range    Preg Test, Ur Negative NEG     Urinalysis with Reflex to Culture    Collection Time: 02/16/24 11:43 AM    Specimen: Urine   Result Value Ref Range    Color, UA YELLOW/STRAW      Appearance CLOUDY (A) CLEAR      Specific Gravity, UA 1.023      pH, Urine 7.5 5.0 - 8.0      Protein, UA TRACE (A) NEG mg/dL    Glucose, UA Negative NEG mg/dL    Ketones, Urine Negative NEG mg/dL    Bilirubin Urine Negative NEG      Blood, Urine Negative NEG      Urobilinogen, Urine 1.0 0.2 - 1.0 EU/dL    Nitrite, Urine Negative NEG      Leukocyte Esterase, Urine MODERATE (A) NEG      WBC, UA 0-4 0 - 4 /hpf    RBC, UA 5-10 0 - 5 /hpf    Epithelial Cells UA MANY (A) FEW /lpf    BACTERIA, URINE 4+ (A) NEG /hpf    Urine Culture if Indicated CULTURE NOT INDICATED BY UA RESULT CNI         CT ABDOMEN PELVIS W IV CONTRAST Additional Contrast? None   Final Result   No acute abdominal or pelvic pathology.      XR SHOULDER LEFT (MIN 2 VIEWS)   Final Result   No acute abnormality.        [unfilled]

## 2024-02-16 NOTE — ED PROVIDER NOTES
Road  [facility] [address]  [phone]    If symptoms worsen, As needed    Arturo Gates MD  [address]  [address]  [facility] [address]  [phone]    In 1 week      [facility]  [address]  [address]  [facility] [address]        [facility]  [address]  [facility] [address]  [phone]            DISCHARGE MEDICATIONS:     Medication List        START taking these medications      methocarbamol 750 MG tablet  Commonly known as: Robaxin-750  Take 1 tablet by mouth 4 times daily for 10 days     naproxen 500 MG tablet  Commonly known as: Naprosyn  Take 1 tablet by mouth 2 times daily            ASK your doctor about these medications      Benzocaine-Menthol 6-10 MG Lozg lozenge  Commonly known as: CEPACOL     hydrocortisone 2.5 % cream     ibuprofen 600 MG tablet  Commonly known as: ADVIL;MOTRIN     phenylephrine-mineral oil-petrolatum 0.25-14-74.9 % rectal ointment  Commonly known as: PREPARATION H     polyethylene glycol 17 GM/SCOOP powder  Commonly known as: GLYCOLAX               Where to Get Your Medications        These medications were sent to [facility]/pharmacy #[id number] [place], [place] - [facility] -  [phone] - F [phone]  [address] [facility] [address]      Phone: [phone]   methocarbamol 750 MG tablet  naproxen 500 MG tablet           DISCONTINUED MEDICATIONS:  Discharge Medication List as of 2/16/2024 12:47 PM          I have seen and evaluated the patient autonomously. My supervision physician was on site and available for consultation if needed.     I am the Primary Clinician of Record.   BERNADETTE Mancia (electronically signed)    (Please note that parts of this dictation were completed with voice recognition software. Quite often unanticipated grammatical, syntax, homophones, and other interpretive errors are inadvertently transcribed by the computer software. Please disregards these errors.

## 2024-02-16 NOTE — ED NOTES
Pt discharged by YEMI Meyer. Discharge instructions discussed and pt given opportunity to ask questions. Pt ambulatory out of ED

## 2024-02-18 LAB
BACTERIA SPEC CULT: NORMAL
CC UR VC: NORMAL
SERVICE CMNT-IMP: NORMAL

## 2024-02-20 NOTE — ED NOTES
"  Problem: Adult Inpatient Plan of Care  Goal: Plan of Care Review  Description: The Plan of Care Review/Shift note should be completed every shift.  The Outcome Evaluation is a brief statement about your assessment that the patient is improving, declining, or no change.  This information will be displayed automatically on your shift  note.  Outcome: Progressing  Flowsheets (Taken 2/20/2024 1052)  Plan of Care Reviewed With: patient  Goal: Patient-Specific Goal (Individualized)  Description: You can add care plan individualizations to a care plan. Examples of Individualization might be:  \"Parent requests to be called daily at 9am for status\", \"I have a hard time hearing out of my right ear\", or \"Do not touch me to wake me up as it startles  me\".  Outcome: Progressing  Goal: Absence of Hospital-Acquired Illness or Injury  Outcome: Progressing  Intervention: Identify and Manage Fall Risk  Recent Flowsheet Documentation  Taken 2/20/2024 0900 by Brice Romeo RN  Safety Promotion/Fall Prevention:   activity supervised   clutter free environment maintained  Intervention: Prevent Skin Injury  Recent Flowsheet Documentation  Taken 2/20/2024 0900 by Brice Romeo RN  Body Position: position changed independently  Intervention: Prevent Infection  Recent Flowsheet Documentation  Taken 2/20/2024 0900 by Brice Romeo RN  Infection Prevention:   rest/sleep promoted   hand hygiene promoted  Goal: Optimal Comfort and Wellbeing  Outcome: Progressing  Intervention: Monitor Pain and Promote Comfort  Recent Flowsheet Documentation  Taken 2/20/2024 0900 by Brice Romeo RN  Pain Management Interventions:   repositioned   rest  Goal: Readiness for Transition of Care  Outcome: Progressing     Problem: Pain Acute  Goal: Optimal Pain Control and Function  Outcome: Progressing  Intervention: Develop Pain Management Plan  Recent Flowsheet Documentation  Taken 2/20/2024 0900 by Brice Romeo RN  Pain Management Interventions:   " Pt at U/S at this time. repositioned   rest  Intervention: Prevent or Manage Pain  Recent Flowsheet Documentation  Taken 2/20/2024 0900 by Brice Romeo RN  Medication Review/Management: medications reviewed     Problem: Comorbidity Management  Goal: Blood Glucose Levels Within Targeted Range  Outcome: Progressing  Intervention: Monitor and Manage Glycemia  Recent Flowsheet Documentation  Taken 2/20/2024 0900 by Brice Romeo RN  Medication Review/Management: medications reviewed  Goal: Maintenance of Heart Failure Symptom Control  Outcome: Progressing  Intervention: Maintain Heart Failure Management  Recent Flowsheet Documentation  Taken 2/20/2024 0900 by Brice Romeo RN  Medication Review/Management: medications reviewed  Goal: Blood Pressure in Desired Range  Outcome: Progressing  Intervention: Maintain Blood Pressure Management  Recent Flowsheet Documentation  Taken 2/20/2024 0900 by Brice Romeo RN  Medication Review/Management: medications reviewed  Goal: Maintenance of Osteoarthritis Symptom Control  Outcome: Progressing  Intervention: Maintain Osteoarthritis Symptom Control  Recent Flowsheet Documentation  Taken 2/20/2024 0900 by Brice Romeo RN  Assistive Device Utilized: walker  Activity Management: activity encouraged  Medication Review/Management: medications reviewed     Problem: Infection  Goal: Absence of Infection Signs and Symptoms  Outcome: Progressing  Intervention: Prevent or Manage Infection  Recent Flowsheet Documentation  Taken 2/20/2024 0900 by Brice Romeo RN  Isolation Precautions: contact precautions maintained   Goal Outcome Evaluation:      Plan of Care Reviewed With: patient

## 2024-03-17 ENCOUNTER — HOSPITAL ENCOUNTER (EMERGENCY)
Facility: HOSPITAL | Age: 29
Discharge: HOME | End: 2024-03-18
Attending: EMERGENCY MEDICINE
Payer: COMMERCIAL

## 2024-03-17 DIAGNOSIS — Z20.2 STD EXPOSURE: Primary | ICD-10-CM

## 2024-03-17 PROCEDURE — 96372 THER/PROPH/DIAG INJ SC/IM: CPT

## 2024-03-17 PROCEDURE — 99284 EMERGENCY DEPT VISIT MOD MDM: CPT

## 2024-03-17 ASSESSMENT — PAIN SCALES - GENERAL: PAINLEVEL_OUTOF10: 0

## 2024-03-17 ASSESSMENT — LIFESTYLE VARIABLES
HOW MANY STANDARD DRINKS CONTAINING ALCOHOL DO YOU HAVE ON A TYPICAL DAY: PATIENT DOES NOT DRINK
HOW OFTEN DO YOU HAVE A DRINK CONTAINING ALCOHOL: NEVER

## 2024-03-18 VITALS
HEART RATE: 90 BPM | WEIGHT: 268.3 LBS | RESPIRATION RATE: 20 BRPM | HEIGHT: 64 IN | OXYGEN SATURATION: 94 % | TEMPERATURE: 98.6 F | BODY MASS INDEX: 45.81 KG/M2 | SYSTOLIC BLOOD PRESSURE: 117 MMHG | DIASTOLIC BLOOD PRESSURE: 74 MMHG

## 2024-03-18 LAB
APPEARANCE UR: CLEAR
BACTERIA URNS QL MICRO: ABNORMAL /HPF
BILIRUB UR QL: NEGATIVE
COLOR UR: ABNORMAL
EPITH CASTS URNS QL MICRO: ABNORMAL /LPF
GLUCOSE UR STRIP.AUTO-MCNC: NEGATIVE MG/DL
HCG UR QL: NEGATIVE
HGB UR QL STRIP: NEGATIVE
HYALINE CASTS URNS QL MICRO: ABNORMAL /LPF (ref 0–2)
KETONES UR QL STRIP.AUTO: NEGATIVE MG/DL
LEUKOCYTE ESTERASE UR QL STRIP.AUTO: ABNORMAL
NITRITE UR QL STRIP.AUTO: NEGATIVE
PH UR STRIP: 6.5 (ref 5–8)
PROT UR STRIP-MCNC: NEGATIVE MG/DL
RBC #/AREA URNS HPF: ABNORMAL /HPF (ref 0–5)
SP GR UR REFRACTOMETRY: 1.01
URINE CULTURE IF INDICATED: ABNORMAL
UROBILINOGEN UR QL STRIP.AUTO: 1 EU/DL (ref 0.2–1)
WBC URNS QL MICRO: ABNORMAL /HPF (ref 0–4)

## 2024-03-18 PROCEDURE — 81001 URINALYSIS AUTO W/SCOPE: CPT

## 2024-03-18 PROCEDURE — 6370000000 HC RX 637 (ALT 250 FOR IP): Performed by: EMERGENCY MEDICINE

## 2024-03-18 PROCEDURE — 6360000002 HC RX W HCPCS: Performed by: EMERGENCY MEDICINE

## 2024-03-18 PROCEDURE — 87110 CHLAMYDIA CULTURE: CPT

## 2024-03-18 PROCEDURE — 87491 CHLMYD TRACH DNA AMP PROBE: CPT

## 2024-03-18 PROCEDURE — 81025 URINE PREGNANCY TEST: CPT

## 2024-03-18 PROCEDURE — 87591 N.GONORRHOEAE DNA AMP PROB: CPT

## 2024-03-18 PROCEDURE — 2500000003 HC RX 250 WO HCPCS: Performed by: EMERGENCY MEDICINE

## 2024-03-18 RX ORDER — DOXYCYCLINE HYCLATE 100 MG
100 TABLET ORAL ONCE
Status: COMPLETED | OUTPATIENT
Start: 2024-03-18 | End: 2024-03-18

## 2024-03-18 RX ORDER — DOXYCYCLINE HYCLATE 100 MG
100 TABLET ORAL 2 TIMES DAILY
Qty: 14 TABLET | Refills: 0 | Status: SHIPPED | OUTPATIENT
Start: 2024-03-18 | End: 2024-03-25

## 2024-03-18 RX ADMIN — DOXYCYCLINE HYCLATE 100 MG: 100 TABLET, COATED ORAL at 00:39

## 2024-03-18 RX ADMIN — LIDOCAINE HYDROCHLORIDE 500 MG: 10 INJECTION, SOLUTION EPIDURAL; INFILTRATION; INTRACAUDAL; PERINEURAL at 00:39

## 2024-03-18 NOTE — ED PROVIDER NOTES
also provided discharge instructions for her that include: educational information regarding their diagnosis and treatment, and list of reasons why they would want to return to the ED prior to their follow-up appointment, should her condition change.     CLINICAL IMPRESSION    Discharge Note: The patient is stable for discharge home. The signs, symptoms, diagnosis, and discharge instructions have been discussed, understanding conveyed, and agreed upon. The patient is to follow up as recommended or return to ER should their symptoms worsen.      PATIENT REFERRED TO:  Arturo Gates MD  [address]  [address]  [facility] [address] [phone]    Schedule an appointment as soon as possible for a visit       [facility] EMERGENCY DEPT  [address]  [facility] [id number] [phone]    If symptoms worsen       DISCHARGE MEDICATIONS:     Medication List        START taking these medications      doxycycline hyclate 100 MG tablet  Commonly known as: VIBRA-TABS  Take 1 tablet by mouth 2 times daily for 7 days            ASK your doctor about these medications      Benzocaine-Menthol 6-10 MG Lozg lozenge  Commonly known as: CEPACOL     hydrocortisone 2.5 % cream     ibuprofen 600 MG tablet  Commonly known as: ADVIL;MOTRIN     naproxen 500 MG tablet  Commonly known as: Naprosyn  Take 1 tablet by mouth 2 times daily     phenylephrine-mineral oil-petrolatum 0.25-14-74.9 % rectal ointment  Commonly known as: PREPARATION H     polyethylene glycol 17 GM/SCOOP powder  Commonly known as: GLYCOLAX               Where to Get Your Medications        These medications were sent to [facility]/pharmacy #[id number] - [place], [place] - [facility] [address] [facility] - P [phone] - F [phone]  [address] [facility] [address] [address]      Phone: [phone]   doxycycline hyclate 100 MG tablet           DISCONTINUED MEDICATIONS:  Current Discharge Medication List          I am the Primary Clinician of Record.   Hal Astorga

## 2024-03-19 LAB
C TRACH DNA SPEC QL NAA+PROBE: POSITIVE
N GONORRHOEA DNA SPEC QL NAA+PROBE: NEGATIVE
SAMPLE TYPE: ABNORMAL
SERVICE CMNT-IMP: ABNORMAL
SPECIMEN SOURCE: ABNORMAL

## 2024-03-21 LAB
C TRACH SPEC QL CULT: NEGATIVE
SPECIMEN SOURCE: NORMAL

## 2024-07-30 ENCOUNTER — TELEPHONE (OUTPATIENT)
Age: 29
End: 2024-07-30

## 2024-08-05 ENCOUNTER — HOSPITAL ENCOUNTER (EMERGENCY)
Facility: HOSPITAL | Age: 29
Discharge: HOME | End: 2024-08-05
Attending: EMERGENCY MEDICINE
Payer: COMMERCIAL

## 2024-08-05 VITALS
WEIGHT: 277.78 LBS | SYSTOLIC BLOOD PRESSURE: 126 MMHG | OXYGEN SATURATION: 100 % | TEMPERATURE: 98.3 F | HEART RATE: 86 BPM | RESPIRATION RATE: 16 BRPM | DIASTOLIC BLOOD PRESSURE: 89 MMHG | HEIGHT: 64 IN | BODY MASS INDEX: 47.42 KG/M2

## 2024-08-05 DIAGNOSIS — M25.512 PERISCAPULAR PAIN OF LEFT SHOULDER: Primary | ICD-10-CM

## 2024-08-05 PROCEDURE — 99284 EMERGENCY DEPT VISIT MOD MDM: CPT

## 2024-08-05 PROCEDURE — 96372 THER/PROPH/DIAG INJ SC/IM: CPT

## 2024-08-05 PROCEDURE — 6360000002 HC RX W HCPCS: Performed by: EMERGENCY MEDICINE

## 2024-08-05 RX ORDER — IBUPROFEN 800 MG/1
800 TABLET ORAL EVERY 8 HOURS PRN
Qty: 24 TABLET | Refills: 0 | Status: SHIPPED | OUTPATIENT
Start: 2024-08-05

## 2024-08-05 RX ORDER — KETOROLAC TROMETHAMINE 30 MG/ML
30 INJECTION, SOLUTION INTRAMUSCULAR; INTRAVENOUS EVERY 6 HOURS PRN
Status: DISCONTINUED | OUTPATIENT
Start: 2024-08-05 | End: 2024-08-05 | Stop reason: HOSPADM

## 2024-08-05 RX ORDER — KETOROLAC TROMETHAMINE 30 MG/ML
30 INJECTION, SOLUTION INTRAMUSCULAR; INTRAVENOUS
Status: DISCONTINUED | OUTPATIENT
Start: 2024-08-05 | End: 2024-08-05

## 2024-08-05 RX ORDER — METHOCARBAMOL 750 MG/1
750 TABLET, FILM COATED ORAL 3 TIMES DAILY PRN
Qty: 15 TABLET | Refills: 0 | Status: SHIPPED | OUTPATIENT
Start: 2024-08-05 | End: 2024-08-10

## 2024-08-05 RX ADMIN — KETOROLAC TROMETHAMINE 30 MG: 30 INJECTION, SOLUTION INTRAMUSCULAR at 05:50

## 2024-08-05 ASSESSMENT — PAIN SCALES - GENERAL: PAINLEVEL_OUTOF10: 10

## 2024-08-05 ASSESSMENT — PAIN DESCRIPTION - FREQUENCY: FREQUENCY: CONTINUOUS

## 2024-08-05 ASSESSMENT — PAIN DESCRIPTION - ORIENTATION: ORIENTATION: LEFT

## 2024-08-05 ASSESSMENT — PAIN DESCRIPTION - DIRECTION: RADIATING_TOWARDS: BACK

## 2024-08-05 ASSESSMENT — PAIN DESCRIPTION - LOCATION: LOCATION: SHOULDER

## 2024-08-05 ASSESSMENT — PAIN DESCRIPTION - PAIN TYPE: TYPE: ACUTE PAIN

## 2024-08-05 ASSESSMENT — PAIN - FUNCTIONAL ASSESSMENT
PAIN_FUNCTIONAL_ASSESSMENT: PREVENTS OR INTERFERES WITH ALL ACTIVE AND SOME PASSIVE ACTIVITIES
PAIN_FUNCTIONAL_ASSESSMENT: 0-10

## 2024-08-05 ASSESSMENT — PAIN DESCRIPTION - ONSET: ONSET: PROGRESSIVE

## 2024-08-05 ASSESSMENT — PAIN DESCRIPTION - DESCRIPTORS: DESCRIPTORS: SHARP;THROBBING

## 2024-08-22 ENCOUNTER — TELEPHONE (OUTPATIENT)
Age: 29
End: 2024-08-22

## 2024-08-22 ENCOUNTER — OFFICE VISIT (OUTPATIENT)
Age: 29
End: 2024-08-22
Payer: COMMERCIAL

## 2024-08-22 VITALS
TEMPERATURE: 98.6 F | WEIGHT: 279 LBS | SYSTOLIC BLOOD PRESSURE: 114 MMHG | OXYGEN SATURATION: 99 % | BODY MASS INDEX: 47.63 KG/M2 | RESPIRATION RATE: 20 BRPM | DIASTOLIC BLOOD PRESSURE: 72 MMHG | HEART RATE: 90 BPM | HEIGHT: 64 IN

## 2024-08-22 DIAGNOSIS — E53.8 VITAMIN B12 DEFICIENCY: ICD-10-CM

## 2024-08-22 DIAGNOSIS — E66.01 MORBID OBESITY (HCC): Primary | ICD-10-CM

## 2024-08-22 DIAGNOSIS — D64.9 ANEMIA, UNSPECIFIED TYPE: ICD-10-CM

## 2024-08-22 DIAGNOSIS — E55.9 VITAMIN D DEFICIENCY: ICD-10-CM

## 2024-08-22 DIAGNOSIS — Z00.00 ENCOUNTER FOR PREVENTIVE CARE: ICD-10-CM

## 2024-08-22 PROCEDURE — 99203 OFFICE O/P NEW LOW 30 MIN: CPT | Performed by: NURSE PRACTITIONER

## 2024-08-22 ASSESSMENT — PATIENT HEALTH QUESTIONNAIRE - PHQ9
SUM OF ALL RESPONSES TO PHQ QUESTIONS 1-9: 0
SUM OF ALL RESPONSES TO PHQ QUESTIONS 1-9: 0
SUM OF ALL RESPONSES TO PHQ9 QUESTIONS 1 & 2: 0
2. FEELING DOWN, DEPRESSED OR HOPELESS: NOT AT ALL
1. LITTLE INTEREST OR PLEASURE IN DOING THINGS: NOT AT ALL
SUM OF ALL RESPONSES TO PHQ QUESTIONS 1-9: 0
SUM OF ALL RESPONSES TO PHQ QUESTIONS 1-9: 0

## 2024-08-22 ASSESSMENT — ENCOUNTER SYMPTOMS
WHEEZING: 0
ABDOMINAL PAIN: 0
SORE THROAT: 0
VOMITING: 0
SINUS PAIN: 0
NAUSEA: 0
CHEST TIGHTNESS: 0
BACK PAIN: 0
ABDOMINAL DISTENTION: 0
DIARRHEA: 0
SHORTNESS OF BREATH: 0

## 2024-08-22 NOTE — PROGRESS NOTES
Identified patient with two patient identifiers (name and ). Reviewed chart in preparation for visit and have obtained necessary documentation.    Lizabeth Dan is a 29 y.o. female  Chief Complaint   Patient presents with    New Patient     Bariatric Consult     /72 (Site: Right Upper Arm, Position: Sitting, Cuff Size: Medium Adult)   Pulse 90   Temp 98.6 °F (37 °C) (Oral)   Resp 20   Ht 1.626 m (5' 4\")   Wt 126.6 kg (279 lb)   SpO2 99%   BMI 47.89 kg/m²     1. Have you been to the ER, urgent care clinic since your last visit?  Hospitalized since your last visit?no    2. Have you seen or consulted any other health care providers outside of the [facility] since your last visit?  Include any pap smears or colon screening. no  
Rhythm: Normal rate and regular rhythm.      Heart sounds: Normal heart sounds. No murmur heard.     No friction rub. No gallop.   Pulmonary:      Effort: Pulmonary effort is normal. No respiratory distress.      Breath sounds: Normal breath sounds.   Abdominal:      General: Bowel sounds are normal. There is no distension.      Palpations: Abdomen is soft.      Tenderness: There is no abdominal tenderness.   Neurological:      Mental Status: She is alert and oriented to person, place, and time.         1. Morbid obesity (HCC)  -     FL UGI W KUB; Future  -     H. Pylori Breath Test; Future  -     Hemoglobin A1C; Future  -     TSH; Future  -     Iron and TIBC; Future  -     Vitamin B12 & Folate; Future  -     Vitamin D 25 Hydroxy; Future  -     Comprehensive Metabolic Panel; Future  -     CBC; Future  2. BMI 45.0-49.9, adult (HCC)  -     FL UGI W KUB; Future  -     H. Pylori Breath Test; Future  -     Hemoglobin A1C; Future  -     TSH; Future  -     Iron and TIBC; Future  -     Vitamin B12 & Folate; Future  -     Vitamin D 25 Hydroxy; Future  -     Comprehensive Metabolic Panel; Future  -     CBC; Future  3. Vitamin D deficiency  -     FL UGI W KUB; Future  -     H. Pylori Breath Test; Future  -     Hemoglobin A1C; Future  -     TSH; Future  -     Iron and TIBC; Future  -     Vitamin B12 & Folate; Future  -     Vitamin D 25 Hydroxy; Future  -     Comprehensive Metabolic Panel; Future  -     CBC; Future  4. Vitamin B12 deficiency  -     FL UGI W KUB; Future  -     H. Pylori Breath Test; Future  -     Hemoglobin A1C; Future  -     TSH; Future  -     Iron and TIBC; Future  -     Vitamin B12 & Folate; Future  -     Vitamin D 25 Hydroxy; Future  -     Comprehensive Metabolic Panel; Future  -     CBC; Future  5. Anemia, unspecified type  -     FL UGI W KUB; Future  -     H. Pylori Breath Test; Future  -     Hemoglobin A1C; Future  -     TSH; Future  -     Iron and TIBC; Future  -     Vitamin B12 & Folate; Future  -

## 2024-08-22 NOTE — TELEPHONE ENCOUNTER
Patient left a voicemail that she would like to switch from the bypass to the sleeve. She had her initial consultation today.

## 2024-08-23 ENCOUNTER — OFFICE VISIT (OUTPATIENT)
Age: 29
End: 2024-08-23

## 2024-08-23 DIAGNOSIS — E66.01 MORBID OBESITY (HCC): Primary | ICD-10-CM

## 2024-08-23 NOTE — PROGRESS NOTES
Pre-operative Bariatric Nutrition Evaluation    Date: 2024              Session 1 of 6    Insurance: [employer] [facility]            Physician/Surgeon: Dr. Rao Walsh      Name: Lizabeth Dan  :  1995  Age:  29  Gender: Female  Type of Surgery: []   Gastric Bypass   [x]    Sleeve Gastrectomy    ASSESSMENT:    Past Medical History: None     Medications/Supplements:   Prior to Admission medications    Medication Sig Start Date End Date Taking? Authorizing Provider   ibuprofen (ADVIL;MOTRIN) 800 MG tablet Take 1 tablet by mouth every 8 hours as needed for Pain 24   Hal Astorga DO   naproxen (NAPROSYN) 500 MG tablet Take 1 tablet by mouth 2 times daily  Patient not taking: Reported on 2024   Soco Combs PA   Benzocaine-Menthol (CEPACOL) 6-10 MG LOZG lozenge 1 lozenge by Transmucosal route as needed  Patient not taking: Reported on 2024   Automatic Reconciliation, Ar   hydrocortisone 2.5 % cream Place rectally 4 times daily 22   Automatic Reconciliation, Ar   ibuprofen (ADVIL;MOTRIN) 600 MG tablet Take 1 tablet by mouth every 6 hours as needed 22   Automatic Reconciliation, Ar   phenylephrine-mineral oil-petrolatum (PREPARATION H) 0.25-14-74.9 % rectal ointment by Transmucosal route 2 times daily 3/6/22   Automatic Reconciliation, Ar   polyethylene glycol (GLYCOLAX) 17 GM/SCOOP powder Take 17 g by mouth daily  Patient not taking: Reported on 2024   Automatic Reconciliation, Ar       Smoking: None  Alcohol:  None    Food Allergies/Intolerances: None    Anthropometrics:    Ht:64 inches  Wt:  279 lbs     IBW: 120 lbs    %IBW: 233     BMI: 47  Category: Obesity class III    Reported wt history: Pt presents today for pre-op nutrition evaluation for wt loss surgery. Reports lowest adult BW of 160 lbs and highest adult BW of 279 lbs. Attributes wt gain over the years r/t birth control, multiple pregnancies, poor eating habits- \"picky eater\". Has attempted wt

## 2024-09-24 ENCOUNTER — OFFICE VISIT (OUTPATIENT)
Age: 29
End: 2024-09-24

## 2024-09-24 DIAGNOSIS — E66.01 MORBID OBESITY: Primary | ICD-10-CM

## 2024-09-30 ENCOUNTER — HOSPITAL ENCOUNTER (EMERGENCY)
Facility: HOSPITAL | Age: 29
Discharge: HOME | End: 2024-09-30
Payer: COMMERCIAL

## 2024-09-30 VITALS
WEIGHT: 276 LBS | BODY MASS INDEX: 47.12 KG/M2 | HEART RATE: 93 BPM | RESPIRATION RATE: 17 BRPM | OXYGEN SATURATION: 95 % | SYSTOLIC BLOOD PRESSURE: 125 MMHG | TEMPERATURE: 97.5 F | DIASTOLIC BLOOD PRESSURE: 71 MMHG | HEIGHT: 64 IN

## 2024-09-30 DIAGNOSIS — Z20.2 STD EXPOSURE: ICD-10-CM

## 2024-09-30 DIAGNOSIS — B96.89 BACTERIAL VAGINOSIS: Primary | ICD-10-CM

## 2024-09-30 DIAGNOSIS — N76.0 BACTERIAL VAGINOSIS: Primary | ICD-10-CM

## 2024-09-30 DIAGNOSIS — Z20.2 EXPOSURE TO CHLAMYDIA: ICD-10-CM

## 2024-09-30 LAB
APPEARANCE UR: ABNORMAL
BACTERIA URNS QL MICRO: ABNORMAL /HPF
BILIRUB UR QL: NEGATIVE
CLUE CELLS VAG QL WET PREP: ABNORMAL
COLOR UR: ABNORMAL
EPITH CASTS URNS QL MICRO: ABNORMAL /LPF
GLUCOSE UR STRIP.AUTO-MCNC: NEGATIVE MG/DL
HCG UR QL: NEGATIVE
HGB UR QL STRIP: NEGATIVE
KETONES UR QL STRIP.AUTO: ABNORMAL MG/DL
LEUKOCYTE ESTERASE UR QL STRIP.AUTO: ABNORMAL
MUCOUS THREADS URNS QL MICRO: ABNORMAL /LPF
NITRITE UR QL STRIP.AUTO: NEGATIVE
PH UR STRIP: 7.5 (ref 5–8)
PROT UR STRIP-MCNC: ABNORMAL MG/DL
RBC #/AREA URNS HPF: ABNORMAL /HPF (ref 0–5)
SP GR UR REFRACTOMETRY: 1.02 (ref 1–1.03)
T VAGINALIS VAG QL WET PREP: ABNORMAL
URINE CULTURE IF INDICATED: ABNORMAL
UROBILINOGEN UR QL STRIP.AUTO: 2 EU/DL (ref 0.2–1)
WBC URNS QL MICRO: ABNORMAL /HPF (ref 0–4)
YEAST: ABNORMAL

## 2024-09-30 PROCEDURE — 87491 CHLMYD TRACH DNA AMP PROBE: CPT

## 2024-09-30 PROCEDURE — 81001 URINALYSIS AUTO W/SCOPE: CPT

## 2024-09-30 PROCEDURE — 87210 SMEAR WET MOUNT SALINE/INK: CPT

## 2024-09-30 PROCEDURE — 87591 N.GONORRHOEAE DNA AMP PROB: CPT

## 2024-09-30 PROCEDURE — 99284 EMERGENCY DEPT VISIT MOD MDM: CPT

## 2024-09-30 PROCEDURE — 81025 URINE PREGNANCY TEST: CPT

## 2024-09-30 PROCEDURE — 96372 THER/PROPH/DIAG INJ SC/IM: CPT

## 2024-09-30 PROCEDURE — 2500000003 HC RX 250 WO HCPCS

## 2024-09-30 PROCEDURE — 6360000002 HC RX W HCPCS

## 2024-09-30 RX ORDER — DOXYCYCLINE HYCLATE 100 MG
100 TABLET ORAL 2 TIMES DAILY
Qty: 14 TABLET | Refills: 0 | Status: SHIPPED | OUTPATIENT
Start: 2024-09-30 | End: 2024-10-07

## 2024-09-30 RX ORDER — METRONIDAZOLE 500 MG/1
500 TABLET ORAL 2 TIMES DAILY
Qty: 14 TABLET | Refills: 0 | Status: SHIPPED | OUTPATIENT
Start: 2024-09-30 | End: 2024-10-07

## 2024-09-30 RX ADMIN — LIDOCAINE HYDROCHLORIDE 500 MG: 10 INJECTION, SOLUTION EPIDURAL; INFILTRATION; INTRACAUDAL; PERINEURAL at 18:07

## 2024-09-30 ASSESSMENT — PAIN - FUNCTIONAL ASSESSMENT: PAIN_FUNCTIONAL_ASSESSMENT: NONE - DENIES PAIN

## 2024-09-30 NOTE — ED NOTES
Pt came to ED today to be assessed for an STD. Pt stated that a sherman she has been seeing, texted her and told her to get checked because another girl he was seeing tested positive for Chlamydia. Pt denies any symptoms. Pt has no other complaints and is not in any distress at this time.

## 2024-09-30 NOTE — ED PROVIDER NOTES
diagnosis, and discharge instructions have been discussed, understanding conveyed, and agreed upon. The patient is to follow up as recommended or return to ER should their symptoms worsen.      PATIENT REFERRED TO:  Arturo Gates MD  [address]  [address]  [facility] [address] [phone]    In 1 week      [facility] EMERGENCY DEPT  [address] [facility] [address]  [phone]    If symptoms worsen       DISCHARGE MEDICATIONS:     Medication List        START taking these medications      doxycycline hyclate 100 MG tablet  Commonly known as: VIBRA-TABS  Take 1 tablet by mouth 2 times daily for 7 days     metroNIDAZOLE 500 MG tablet  Commonly known as: FLAGYL  Take 1 tablet by mouth 2 times daily for 7 days            ASK your doctor about these medications      Benzocaine-Menthol 6-10 MG Lozg lozenge  Commonly known as: CEPACOL     hydrocortisone 2.5 % cream     * ibuprofen 600 MG tablet  Commonly known as: ADVIL;MOTRIN     * ibuprofen 800 MG tablet  Commonly known as: ADVIL;MOTRIN  Take 1 tablet by mouth every 8 hours as needed for Pain     naproxen 500 MG tablet  Commonly known as: Naprosyn  Take 1 tablet by mouth 2 times daily     phenylephrine-mineral oil-petrolatum 0.25-14-74.9 % rectal ointment  Commonly known as: PREPARATION H     polyethylene glycol 17 GM/SCOOP powder  Commonly known as: GLYCOLAX           * This list has 2 medication(s) that are the same as other medications prescribed for you. Read the directions carefully, and ask your doctor or other care provider to review them with you.                   Where to Get Your Medications        These medications were sent to [facility]/pharmacy #[id number] - [place], [place] - [address] - P [phone] - F [phone]  [address] [facility] [address]      Hours: 24-hours Phone: [phone]   doxycycline hyclate 100 MG tablet  metroNIDAZOLE 500 MG tablet           DISCONTINUED MEDICATIONS:  Discharge Medication List

## 2024-10-10 ENCOUNTER — TELEPHONE (OUTPATIENT)
Age: 29
End: 2024-10-10

## 2024-10-10 NOTE — TELEPHONE ENCOUNTER
Identified patient with two patient identifiers (name and ). Reviewed chart in preparation for encounter and have obtained necessary documentation.    Called and spoke with patient regarding [facility] insurance requirements. Informed patient I would resend out her letter through mail. Patient given all numbers/ information to get all other requirements completed. Patient understood what was discussed and thankful for the follow up call.

## 2024-10-17 ENCOUNTER — TELEPHONE (OUTPATIENT)
Age: 29
End: 2024-10-17

## 2024-10-17 NOTE — TELEPHONE ENCOUNTER
Sent Psychological Evaluation Assessment via email; Explained to patient After this has been returned, please allow 2-3 business days for this to be reviewed and to receive a call back to schedule an appointment.

## 2024-10-21 ENCOUNTER — TELEPHONE (OUTPATIENT)
Age: 29
End: 2024-10-21

## 2024-10-22 ENCOUNTER — OFFICE VISIT (OUTPATIENT)
Age: 29
End: 2024-10-22

## 2024-10-22 DIAGNOSIS — E66.01 MORBID OBESITY: Primary | ICD-10-CM

## 2024-10-22 NOTE — PROGRESS NOTES
[facility]  Supervised Weight Loss     Date:   10/22/2024    Patient's Name: Lizabeth Dan  : 1995    Insurance:   [employer]           Session: 3 of  6  Surgery: Sleeve gastrectomy                      Surgeon:  Dr. Rao Walsh     Height: 64 inches       Weight:    274      Lbs (per pt).                              BMI: 47              Pounds Lost since last month: 5 lbs               Pounds Gained since last month: 0     Starting Weight: 279 lbs                   Previous Month’s Weight: 279 lbs  Overall Pounds Lost: 5 lbs       Overall Pounds Gained: 0     Other Pertinent Information: Today's appointment was completed in a virtual setting.      Smoking Status:  None  Alcohol Intake: None    I have reviewed with pt the guidelines of the supervised wt loss program.  Pt understands the expectations of some wt loss during the program and that wt gain could delay the process. I have also explained that appointments need to be consecutive and missing an appointment may result in starting over. Pt has received this information in writing.          Changes that patient has made since last month include:  started walking, reading food labels.      Eating Habits and Behaviors  General healthy eating guidelines were also discussed. Pts were instructed that their plate should be made up 1/2 plate coming from non-starchy vegetables, 1/4 coming from lean meat, and 1/4 of their plate coming from carbohydrates, including fruits, starches, or milk. We discussed measuring meals to 1/2 cup total per meal after surgery. Drinking only calorie-free, sugar-free and non-carbonated beverages. We discussed the importance of drinking 64 ounces of fluid per day to prevent dehydration post-operatively.                       Patient's current diet habits include: Pt is eating 3 meals per day (B: eggs; L: turkey sandwich; D: chix, rice, green beans). Snack choices include granola

## 2024-10-27 ENCOUNTER — HOSPITAL ENCOUNTER (EMERGENCY)
Facility: HOSPITAL | Age: 29
Discharge: HOME | End: 2024-10-27
Attending: EMERGENCY MEDICINE
Payer: COMMERCIAL

## 2024-10-27 VITALS
OXYGEN SATURATION: 100 % | BODY MASS INDEX: 47.38 KG/M2 | SYSTOLIC BLOOD PRESSURE: 132 MMHG | HEART RATE: 105 BPM | DIASTOLIC BLOOD PRESSURE: 78 MMHG | TEMPERATURE: 98 F | RESPIRATION RATE: 18 BRPM | HEIGHT: 64 IN

## 2024-10-27 DIAGNOSIS — T14.8XXA BLEEDING FROM WOUND: ICD-10-CM

## 2024-10-27 DIAGNOSIS — S31.811A: Primary | ICD-10-CM

## 2024-10-27 PROCEDURE — 99283 EMERGENCY DEPT VISIT LOW MDM: CPT

## 2024-10-27 PROCEDURE — 2500000003 HC RX 250 WO HCPCS: Performed by: EMERGENCY MEDICINE

## 2024-10-27 PROCEDURE — 12035 INTMD RPR S/A/T/EXT 12.6-20: CPT

## 2024-10-27 PROCEDURE — 6370000000 HC RX 637 (ALT 250 FOR IP): Performed by: EMERGENCY MEDICINE

## 2024-10-27 RX ORDER — LIDOCAINE HYDROCHLORIDE AND EPINEPHRINE 10; 10 MG/ML; UG/ML
20 INJECTION, SOLUTION INFILTRATION; PERINEURAL ONCE
Status: COMPLETED | OUTPATIENT
Start: 2024-10-27 | End: 2024-10-27

## 2024-10-27 RX ORDER — GINSENG 100 MG
CAPSULE ORAL
Status: COMPLETED | OUTPATIENT
Start: 2024-10-27 | End: 2024-10-27

## 2024-10-27 RX ORDER — GINSENG 100 MG
CAPSULE ORAL
Qty: 28 G | Refills: 0 | Status: SHIPPED | OUTPATIENT
Start: 2024-10-27 | End: 2024-11-06

## 2024-10-27 RX ADMIN — BACITRACIN: 500 OINTMENT TOPICAL at 06:20

## 2024-10-27 RX ADMIN — LIDOCAINE HYDROCHLORIDE AND EPINEPHRINE 20 ML: 10; 10 INJECTION, SOLUTION INFILTRATION; PERINEURAL at 06:10

## 2024-10-27 ASSESSMENT — PAIN - FUNCTIONAL ASSESSMENT: PAIN_FUNCTIONAL_ASSESSMENT: NONE - DENIES PAIN

## 2024-10-27 NOTE — ED NOTES
Emergency Department Nursing Plan of Care       The Nursing Plan of Care is developed from the Nursing assessment and Emergency Department Attending provider initial evaluation.  The plan of care may be reviewed in the “ED Provider note”.    The Plan of Care was developed with the following considerations:   Patient / Family readiness to learn indicated by:verbalized understanding  Persons(s) to be included in education: patient  Barriers to Learning/Limitations:NoNormal    Signed     Kasey Harris RN    10/27/2024   5:41 AM
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
## 2024-10-27 NOTE — DISCHARGE INSTRUCTIONS
please call your primary care physician or contact the ER to speak with the charge nurse.     Please make an appointment with your family doctor or the physician you were referred to for follow-up of this visit as instructed on your discharge paperwork. Return to the ER if you are unable to be seen or if you are unable to be seen in a timely manner.    If you have any problem arranging the follow-up visit, contact the Emergency Department immediately.

## 2024-10-28 ENCOUNTER — TELEPHONE (OUTPATIENT)
Age: 29
End: 2024-10-28

## 2024-11-01 ENCOUNTER — HOSPITAL ENCOUNTER (OUTPATIENT)
Facility: HOSPITAL | Age: 29
Discharge: HOME | End: 2024-11-04
Payer: COMMERCIAL

## 2024-11-01 DIAGNOSIS — Z00.00 ENCOUNTER FOR PREVENTIVE CARE: ICD-10-CM

## 2024-11-01 DIAGNOSIS — E53.8 VITAMIN B12 DEFICIENCY: ICD-10-CM

## 2024-11-01 DIAGNOSIS — D64.9 ANEMIA, UNSPECIFIED TYPE: ICD-10-CM

## 2024-11-01 DIAGNOSIS — E66.01 MORBID OBESITY: ICD-10-CM

## 2024-11-01 DIAGNOSIS — E55.9 VITAMIN D DEFICIENCY: ICD-10-CM

## 2024-11-01 PROCEDURE — 74246 X-RAY XM UPR GI TRC 2CNTRST: CPT

## 2024-11-14 ENCOUNTER — TELEMEDICINE (OUTPATIENT)
Age: 29
End: 2024-11-14

## 2024-11-14 DIAGNOSIS — F50.9 EATING DISORDER, UNSPECIFIED TYPE: Primary | ICD-10-CM

## 2024-11-14 DIAGNOSIS — F43.21 ADJUSTMENT DISORDER WITH DEPRESSED MOOD: ICD-10-CM

## 2024-11-14 DIAGNOSIS — E66.01 MORBID OBESITY: ICD-10-CM

## 2024-11-14 NOTE — PROGRESS NOTES
[facility]  Bariatric Psychosocial Evaluation - Part 1    This note will not be viewable in [facility] for the following reason(s). This is a Psychotherapy Note. (Behavioral Health Providers Only)    Date of Intake:  2024   Start Time:  9:05 AM  End Time:  9:47 AM  CPT code: 52760  Telehealth:  Yes     Name: Lizabeth Dan      :  1995   Gender:  female   Marital Status:Single     Race/Ethnicity:  Other   Type of Service:  [address] - Psychiatric Diagnostic Evaluation      Lizabeth Dan was evaluated through a patient-initiated, synchronous (real-time) audio-visual encounter.  Patient (or guardian if applicable) is aware that it is a billable service, which includes applicable co-pays, with coverage as determined by her insurance carrier. This visit was conducted with the patient's (and/or legal guardian's) verbal consent.  The patient was located in a state where the provider was licensed to provide care.  The patient was located at: Home: [address] [place] [phone]  The provider was located at: Home (Appt Dept State): [place]       REASON FOR REFERRAL:    Lizabeth Dan is a 29 year-old, single, , female referred by her surgeon, Dr. Rao Walsh, to determine her appropriateness for bariatric surgery.  She is 5 feet 4 inches tall and her total weight today is 273 lbs.  The purpose of the evaluation is to assess personality, psychopathology, emotional functioning, and health behavior adherence through clinical interview and psychological testing to identify factors that might provide challenges to optimal recovery for bariatric/metabolic surgery (BMS).    HISTORY OF PRESENTING PROBLEM:    Ms. Dan reported having struggled with her weight since the age of 19-20.  The patient stated that her lowest adult weight was 160 lbs. and highest adult weight was 279 lbs.  Patient endorsed the following contributors to her weight problems:  []  Genetics    ?  Poor Eating

## 2024-11-19 ENCOUNTER — APPOINTMENT (OUTPATIENT)
Facility: HOSPITAL | Age: 29
End: 2024-11-19
Payer: COMMERCIAL

## 2024-11-19 ENCOUNTER — HOSPITAL ENCOUNTER (EMERGENCY)
Facility: HOSPITAL | Age: 29
Discharge: HOME | End: 2024-11-20
Attending: STUDENT IN AN ORGANIZED HEALTH CARE EDUCATION/TRAINING PROGRAM
Payer: COMMERCIAL

## 2024-11-19 VITALS
WEIGHT: 273 LBS | OXYGEN SATURATION: 97 % | HEART RATE: 96 BPM | RESPIRATION RATE: 18 BRPM | HEIGHT: 64 IN | DIASTOLIC BLOOD PRESSURE: 60 MMHG | TEMPERATURE: 99.2 F | SYSTOLIC BLOOD PRESSURE: 125 MMHG | BODY MASS INDEX: 46.61 KG/M2

## 2024-11-19 DIAGNOSIS — G43.001 MIGRAINE WITHOUT AURA AND WITH STATUS MIGRAINOSUS, NOT INTRACTABLE: Primary | ICD-10-CM

## 2024-11-19 LAB
ALBUMIN SERPL-MCNC: 2.8 G/DL (ref 3.5–5)
ALBUMIN/GLOB SERPL: 0.7 (ref 1.1–2.2)
ALP SERPL-CCNC: 73 U/L (ref 45–117)
ALT SERPL-CCNC: 42 U/L (ref 12–78)
ANION GAP SERPL CALC-SCNC: 10 MMOL/L (ref 2–12)
APPEARANCE UR: ABNORMAL
AST SERPL-CCNC: 27 U/L (ref 15–37)
BACTERIA URNS QL MICRO: ABNORMAL /HPF
BASOPHILS # BLD: 0 K/UL (ref 0–0.1)
BASOPHILS NFR BLD: 0 % (ref 0–1)
BILIRUB SERPL-MCNC: 0.5 MG/DL (ref 0.2–1)
BILIRUB UR QL CFM: NEGATIVE
BUN SERPL-MCNC: 9 MG/DL (ref 6–20)
BUN/CREAT SERPL: 12 (ref 12–20)
CALCIUM SERPL-MCNC: 8.6 MG/DL (ref 8.5–10.1)
CHLORIDE SERPL-SCNC: 97 MMOL/L (ref 97–108)
CO2 SERPL-SCNC: 26 MMOL/L (ref 21–32)
COLOR UR: ABNORMAL
CREAT SERPL-MCNC: 0.76 MG/DL (ref 0.55–1.02)
DIFFERENTIAL METHOD BLD: ABNORMAL
EOSINOPHIL # BLD: 0 K/UL (ref 0–0.4)
EOSINOPHIL NFR BLD: 0 % (ref 0–7)
EPITH CASTS URNS QL MICRO: ABNORMAL /LPF
ERYTHROCYTE [DISTWIDTH] IN BLOOD BY AUTOMATED COUNT: 14.6 % (ref 11.5–14.5)
GLOBULIN SER CALC-MCNC: 4.2 G/DL (ref 2–4)
GLUCOSE SERPL-MCNC: 99 MG/DL (ref 65–100)
GLUCOSE UR STRIP.AUTO-MCNC: NEGATIVE MG/DL
HCG UR QL: NEGATIVE
HCT VFR BLD AUTO: 32.9 % (ref 35–47)
HGB BLD-MCNC: 10.7 G/DL (ref 11.5–16)
HGB UR QL STRIP: ABNORMAL
IMM GRANULOCYTES # BLD AUTO: 0 K/UL (ref 0–0.04)
IMM GRANULOCYTES NFR BLD AUTO: 0 % (ref 0–0.5)
KETONES UR QL STRIP.AUTO: ABNORMAL MG/DL
LEUKOCYTE ESTERASE UR QL STRIP.AUTO: ABNORMAL
LYMPHOCYTES # BLD: 2.1 K/UL (ref 0.8–3.5)
LYMPHOCYTES NFR BLD: 16 % (ref 12–49)
MCH RBC QN AUTO: 26.5 PG (ref 26–34)
MCHC RBC AUTO-ENTMCNC: 32.5 G/DL (ref 30–36.5)
MCV RBC AUTO: 81.4 FL (ref 80–99)
MONOCYTES # BLD: 0.4 K/UL (ref 0–1)
MONOCYTES NFR BLD: 3 % (ref 5–13)
NEUTS SEG # BLD: 10.9 K/UL (ref 1.8–8)
NEUTS SEG NFR BLD: 81 % (ref 32–75)
NITRITE UR QL STRIP.AUTO: NEGATIVE
NRBC # BLD: 0 K/UL (ref 0–0.01)
NRBC BLD-RTO: 0 PER 100 WBC
PH UR STRIP: 6.5 (ref 5–8)
PLATELET # BLD AUTO: 419 K/UL (ref 150–400)
PMV BLD AUTO: 9.3 FL (ref 8.9–12.9)
POTASSIUM SERPL-SCNC: 3.9 MMOL/L (ref 3.5–5.1)
PROT SERPL-MCNC: 7 G/DL (ref 6.4–8.2)
PROT UR STRIP-MCNC: 100 MG/DL
RBC # BLD AUTO: 4.04 M/UL (ref 3.8–5.2)
RBC #/AREA URNS HPF: ABNORMAL /HPF (ref 0–5)
SODIUM SERPL-SCNC: 133 MMOL/L (ref 136–145)
SP GR UR REFRACTOMETRY: >1.03 (ref 1–1.03)
URINE CULTURE IF INDICATED: ABNORMAL
UROBILINOGEN UR QL STRIP.AUTO: 1 EU/DL (ref 0.2–1)
WBC # BLD AUTO: 13.5 K/UL (ref 3.6–11)
WBC URNS QL MICRO: ABNORMAL /HPF (ref 0–4)

## 2024-11-19 PROCEDURE — 81001 URINALYSIS AUTO W/SCOPE: CPT

## 2024-11-19 PROCEDURE — 6370000000 HC RX 637 (ALT 250 FOR IP): Performed by: STUDENT IN AN ORGANIZED HEALTH CARE EDUCATION/TRAINING PROGRAM

## 2024-11-19 PROCEDURE — 70450 CT HEAD/BRAIN W/O DYE: CPT

## 2024-11-19 PROCEDURE — 80053 COMPREHEN METABOLIC PANEL: CPT

## 2024-11-19 PROCEDURE — 6360000002 HC RX W HCPCS: Performed by: STUDENT IN AN ORGANIZED HEALTH CARE EDUCATION/TRAINING PROGRAM

## 2024-11-19 PROCEDURE — 96374 THER/PROPH/DIAG INJ IV PUSH: CPT

## 2024-11-19 PROCEDURE — 2580000003 HC RX 258: Performed by: STUDENT IN AN ORGANIZED HEALTH CARE EDUCATION/TRAINING PROGRAM

## 2024-11-19 PROCEDURE — 99284 EMERGENCY DEPT VISIT MOD MDM: CPT

## 2024-11-19 PROCEDURE — 85025 COMPLETE CBC W/AUTO DIFF WBC: CPT

## 2024-11-19 PROCEDURE — 81025 URINE PREGNANCY TEST: CPT

## 2024-11-19 PROCEDURE — 96375 TX/PRO/DX INJ NEW DRUG ADDON: CPT

## 2024-11-19 RX ORDER — SODIUM CHLORIDE, SODIUM LACTATE, POTASSIUM CHLORIDE, AND CALCIUM CHLORIDE .6; .31; .03; .02 G/100ML; G/100ML; G/100ML; G/100ML
500 INJECTION, SOLUTION INTRAVENOUS
Status: COMPLETED | OUTPATIENT
Start: 2024-11-19 | End: 2024-11-20

## 2024-11-19 RX ORDER — ONDANSETRON 4 MG/1
4 TABLET, ORALLY DISINTEGRATING ORAL 3 TIMES DAILY PRN
Qty: 21 TABLET | Refills: 0 | Status: SHIPPED | OUTPATIENT
Start: 2024-11-19

## 2024-11-19 RX ORDER — ACETAMINOPHEN 500 MG
1000 TABLET ORAL
Status: COMPLETED | OUTPATIENT
Start: 2024-11-19 | End: 2024-11-19

## 2024-11-19 RX ORDER — PROCHLORPERAZINE EDISYLATE 5 MG/ML
10 INJECTION INTRAMUSCULAR; INTRAVENOUS ONCE
Status: COMPLETED | OUTPATIENT
Start: 2024-11-19 | End: 2024-11-19

## 2024-11-19 RX ORDER — ACETAMINOPHEN 500 MG
1000 TABLET ORAL 3 TIMES DAILY PRN
Qty: 100 TABLET | Refills: 0 | Status: SHIPPED | OUTPATIENT
Start: 2024-11-19

## 2024-11-19 RX ORDER — SODIUM CHLORIDE, SODIUM LACTATE, POTASSIUM CHLORIDE, AND CALCIUM CHLORIDE .6; .31; .03; .02 G/100ML; G/100ML; G/100ML; G/100ML
500 INJECTION, SOLUTION INTRAVENOUS ONCE
Status: COMPLETED | OUTPATIENT
Start: 2024-11-19 | End: 2024-11-19

## 2024-11-19 RX ORDER — DIPHENHYDRAMINE HYDROCHLORIDE 50 MG/ML
25 INJECTION INTRAMUSCULAR; INTRAVENOUS
Status: COMPLETED | OUTPATIENT
Start: 2024-11-19 | End: 2024-11-19

## 2024-11-19 RX ADMIN — SODIUM CHLORIDE, POTASSIUM CHLORIDE, SODIUM LACTATE AND CALCIUM CHLORIDE 500 ML: 600; 310; 30; 20 INJECTION, SOLUTION INTRAVENOUS at 22:30

## 2024-11-19 RX ADMIN — SODIUM CHLORIDE, POTASSIUM CHLORIDE, SODIUM LACTATE AND CALCIUM CHLORIDE 500 ML: 600; 310; 30; 20 INJECTION, SOLUTION INTRAVENOUS at 23:31

## 2024-11-19 RX ADMIN — ACETAMINOPHEN 1000 MG: 500 TABLET ORAL at 23:08

## 2024-11-19 RX ADMIN — DIPHENHYDRAMINE HYDROCHLORIDE 25 MG: 50 INJECTION INTRAMUSCULAR; INTRAVENOUS at 23:09

## 2024-11-19 RX ADMIN — PROCHLORPERAZINE EDISYLATE 10 MG: 5 INJECTION INTRAMUSCULAR; INTRAVENOUS at 23:09

## 2024-11-19 ASSESSMENT — PAIN - FUNCTIONAL ASSESSMENT: PAIN_FUNCTIONAL_ASSESSMENT: 0-10

## 2024-11-19 ASSESSMENT — PAIN DESCRIPTION - PAIN TYPE: TYPE: ACUTE PAIN

## 2024-11-19 ASSESSMENT — PAIN DESCRIPTION - DESCRIPTORS: DESCRIPTORS: THROBBING;SHARP

## 2024-11-19 ASSESSMENT — PAIN DESCRIPTION - LOCATION: LOCATION: HEAD

## 2024-11-19 ASSESSMENT — PAIN DESCRIPTION - ORIENTATION: ORIENTATION: LEFT

## 2024-11-19 ASSESSMENT — PAIN SCALES - GENERAL: PAINLEVEL_OUTOF10: 9

## 2024-11-20 ENCOUNTER — OFFICE VISIT (OUTPATIENT)
Age: 29
End: 2024-11-20

## 2024-11-20 DIAGNOSIS — E66.01 MORBID OBESITY: Primary | ICD-10-CM

## 2024-11-20 NOTE — ED NOTES
Discharge instructions were given to the patient by Chantal Edwards RN.    The patient left the Emergency Department ambulatory, alert and oriented and in no acute distress with 2 prescriptions. The patient was encouraged to call or return to the ED for worsening issues or problems and was encouraged to schedule a follow up appointment for continuing care.    The patient verbalized understanding of discharge instructions and prescriptions, all questions were answered. The patient has no further concerns at this time.

## 2024-11-20 NOTE — PROGRESS NOTES
[facility]  Supervised Weight Loss     Date:   2024    Patient's Name: Lizabeth Dan  : 1995    Lizabeth Dan was evaluated through a synchronous (real-time) audio encounter. Patient identification was verified at the start of the visit.    The patient was located at Home: [address] [place] [address] B  [facility] [id number].  The provider was located at Facility (Appt Dept): [address] [place] [id number].  Confirm you are appropriately licensed, registered, or certified to deliver care in the state where the patient is located as indicated above. If you are not or unsure, please re-schedule the visit: Yes, I confirm.         Insurance:   [employer]           Session:   6  Surgery: Sleeve gastrectomy                      Surgeon:  Dr. Rao Walsh     Height: 64 inches       Weight:    273      Lbs (per EHR).                              BMI: 46              Pounds Lost since last month: 1 lb               Pounds Gained since last month: 0     Starting Weight: 279 lbs                   Previous Month’s Weight: 274 lbs  Overall Pounds Lost: 6 lbs       Overall Pounds Gained: 0     Other Pertinent Information: Today's appointment was completed in a virtual setting.      Smoking Status:  None  Alcohol Intake: None    I have reviewed with pt the guidelines of the supervised wt loss program.  Pt understands the expectations of some wt loss during the program and that wt gain could delay the process. I have also explained that appointments need to be consecutive and missing an appointment may result in starting over. Pt has received this information in writing.          Changes that patient has made since last month include:  continued with consistent meals, increased exercise.      Eating Habits and Behaviors  A nutrition lesson was presented on label reading with specific guidelines provided for limiting added sugars. This

## 2024-11-20 NOTE — ED NOTES
Pt presents to ED with CC of intractable headache since 1500 yesterday as well as n/v x1 day.  Pt reports taking tyleonl at 1800 with no reliefe for h/a.  Pt also reports poor po intake today with muleiple episodes of emesis.  Pt reports last emesis at approx 2030.  Pt denies pelvis pain or issues with voiding.  Pt denies fever and diarrhea.  Pt reports generalized abdominal \"discomfor.\"  Pt reports LMP 11/12 and reports she is still bleeding.    Pt Aox4, answering questions appropriately.  VSS and WNL.  Pt abdomen obese, soft and nontender to palpation.  Pt mucosa pink and moist.  Skin warm and dry.  RR even and unlabored.  No acute s/sx of distress.    Provider in at bedside to assess and review POC.  All questions answered and pt verbalized understanding.      Emergency Department Nursing Plan of Care       The Nursing Plan of Care is developed from the Nursing assessment and Emergency Department Attending provider initial evaluation.  The plan of care may be reviewed in the “ED Provider note”.    The Plan of Care was developed with the following considerations:   Patient / Family readiness to learn indicated by:Refer to Medical chart in [facility]  Persons(s) to be included in education: Refer to Medical chart in [facility]  Barriers to Learning/Limitations:Normal    Signed     Jorge Zuñiga RN    11/19/2024   10:41 PM

## 2024-11-20 NOTE — ED TRIAGE NOTES
Pt c/o headache with vomiting x 2 days. Pt reports taking tylenol around 1800 without relief. Last emesis 30 mins ago.

## 2024-11-20 NOTE — ED PROVIDER NOTES
[facility] EMERGENCY DEPT  EMERGENCY DEPARTMENT ENCOUNTER       Pt Name: Lizabeth Dan  MRN: [id number]  Birthdate 1995  Date of evaluation: 11/19/2024  Provider: Boni Hercules MD   PCP: Arturo Gates MD  Note Started: 10:41 PM EST 11/19/24     CHIEF COMPLAINT       Chief Complaint   Patient presents with    Headache        HISTORY OF PRESENT ILLNESS: 1 or more elements      History From: Patient  HPI Limitations: None     Lizabeth Dan is a 29 y.o. female who presents for a headache since yesterday.  She states she has had tension headaches previously but reports this headache is more severe.  She reports headache is frontal, radiates to the top of her head.  She reports associated photophobia, nausea and episode of vomiting.  She denies fever, chills, rash, chest pain, shortness of breath, sore throat.  Denies recent illnesses.  No medications taken prior to arrival.  No head injury.  No syncope.  No new symptoms today.  She reports her symptoms are not improved since yesterday and presents for evaluation.  Denies tobacco, alcohol, drug use.       Nursing Notes were all reviewed and agreed with or any disagreements were addressed in the HPI.     REVIEW OF SYSTEMS      Review of Systems     Positives and Pertinent negatives as per HPI.    PAST HISTORY     Past Medical History:  Past Medical History:   Diagnosis Date    Abdominal pain, other specified site     Anemia     Keratosis pilaris 10/1/2009    Otitis media          Past Surgical History:  Past Surgical History:   Procedure Laterality Date    ADENOIDECTOMY      COLONOSCOPY N/A 9/17/2020    COLONOSCOPY/EGD performed by Migue Walsh MD at [facility] ENDOSCOPY    GYN      HEENT      tubal/tonsils and adnoids    OTHER SURGICAL HISTORY      tonsilectomy childhood    OTHER SURGICAL HISTORY      left ovary cyst removal     TONSILLECTOMY      TYMPANOSTOMY TUBE PLACEMENT         Family History:  Family History   Problem Relation Age of Onset    Asthma Mother     Stroke

## 2024-12-03 ENCOUNTER — TELEMEDICINE (OUTPATIENT)
Age: 29
End: 2024-12-03
Payer: COMMERCIAL

## 2024-12-03 DIAGNOSIS — F43.21 ADJUSTMENT DISORDER WITH DEPRESSED MOOD: ICD-10-CM

## 2024-12-03 DIAGNOSIS — F50.9 EATING DISORDER, UNSPECIFIED TYPE: Primary | ICD-10-CM

## 2024-12-03 DIAGNOSIS — E66.01 MORBID OBESITY: ICD-10-CM

## 2024-12-03 PROCEDURE — 90832 PSYTX W PT 30 MINUTES: CPT | Performed by: COUNSELOR

## 2024-12-03 NOTE — PROGRESS NOTES
[facility]  Psychosocial Evaluation Part 2 - Mental Health Progress Note    This note will not be viewable in [employer] for the following reason(s). This is a Psychotherapy Note. (Behavioral Health Providers Only)    Name: Lizabeth Dan  : 1995  MRN: [id number]  Date of Service: 12/3/2024  Location of Service: [place] for both provider and patient  Type of Service:  Individual Therapy (Virtual Visit)  Start Time:  3:02 PM   End Time:   3:26 PM  CPT code: 12705      Lizabeth Dan was evaluated through a patient-initiated, synchronous (real-time) audio-visual encounter.  Patient (or guardian if applicable) is aware that it is a billable service, which includes applicable co-pays, with coverage as determined by her insurance carrier. This visit was conducted with the patient's (and/or legal guardian's) verbal consent.  The patient was located in a state where the provider was licensed to provide care.  The patient was located at: Home: [address] [place] [phone]  The provider was located at: Home (Appt Dept State): [place]        ICD-10-CM     1. Eating disorder, unspecified type  F50.9         2. Adjustment disorder with depressed mood  F43.21         3. Morbid obesity  E66.01         Reason for Visit:  Follow-up to Bariatric Evaluation - to determine appropriateness for bariatric surgery from a psychosocial perspective with requirements and/or recommendations.  Following the Bariatric Psychosocial Evaluation - Part 2, a formal copy of the Bariatric Psychosocial Evaluation will be scanned into Epic EHR.       Subjective (patient report):  Patient acknowledged taking medication and denied any side-effects.  Did not endorse any depression, anxiety, kd or psychotic symptoms.      Objective (factual account of what was observed, mental health status):  Mental Health Status: Patient was dressed properly. No abnormal psychomotor movements observed. Intellectual functioning

## 2024-12-06 ENCOUNTER — TELEPHONE (OUTPATIENT)
Age: 29
End: 2024-12-06

## 2024-12-06 NOTE — TELEPHONE ENCOUNTER
This therapist called Lizabeth to remind her that she had a counseling appointment today, 12/6/24, at 10 AM.  The patient did not answer and this therapist left the message that she would wait until 10:15 AM online and if she did not show, she would blake her as a no show.  This therapist also reminded her that she has an additional session scheduled for 12/20/24 with this provider.  This patient was a no show.

## 2024-12-18 ENCOUNTER — OFFICE VISIT (OUTPATIENT)
Age: 29
End: 2024-12-18

## 2024-12-18 DIAGNOSIS — E66.01 MORBID OBESITY: Primary | ICD-10-CM

## 2024-12-18 NOTE — PROGRESS NOTES
[facility]  Supervised Weight Loss     Date:   2024    Patient's Name: Lizabeth Dan  : 1995    Lizabeth Dan was evaluated through a synchronous (real-time) audio encounter. Patient identification was verified at the start of the visit.    The patient was located at Home: [address] [place] [phone].  The provider was located at [place], [place]  Confirm you are appropriately licensed, registered, or certified to deliver care in the state where the patient is located as indicated above. If you are not or unsure, please re-schedule the visit: Yes, I confirm.        Insurance:   [employer]           Session:   Surgery: Sleeve gastrectomy                      Surgeon:  Dr. Rao Walsh     Height: 64 inches       Weight:    273      Lbs (last months wt).                              BMI: 46              Pounds Lost since last month: 0               Pounds Gained since last month: 0     Starting Weight: 279 lbs                   Previous Month’s Weight: 273 lbs  Overall Pounds Lost: 6 lbs       Overall Pounds Gained: 0     Other Pertinent Information: Today's appointment was completed in a virtual setting.      Smoking Status:  None  Alcohol Intake: None    I have reviewed with pt the guidelines of the supervised wt loss program.  Pt understands the expectations of some wt loss during the program and that wt gain could delay the process. I have also explained that classes need to be consecutive.  Missing a class may result in starting over. Pt has received this information in writing.          Changes that patient has made since last month include:  practiced drinking rule.      Eating Habits and Behaviors  General healthy eating guidelines were discussed. A nutrition lesson specific to the importance of protein intake after surgery was provided. We discussed food sources of protein, protein supplements and multiple reasons as to

## 2024-12-20 ENCOUNTER — TELEMEDICINE (OUTPATIENT)
Age: 29
End: 2024-12-20

## 2024-12-20 DIAGNOSIS — E66.01 MORBID OBESITY: ICD-10-CM

## 2024-12-20 DIAGNOSIS — F43.21 ADJUSTMENT DISORDER WITH DEPRESSED MOOD: ICD-10-CM

## 2024-12-20 DIAGNOSIS — F50.9 EATING DISORDER, UNSPECIFIED TYPE: Primary | ICD-10-CM

## 2024-12-20 NOTE — PROGRESS NOTES
[facility]  Counseling Progress Note    This note will not be viewable in [employer] for the following reason(s). This is a Psychotherapy Note. (Behavioral Health Providers Only)    Name: Lizabeth Dan  : 1995  MRN: [id number]  Date of Service: 2024  Location of Service: [place] for both provider and patient  Type of Service:  Individual Therapy (Virtual Visit)  Start Time:  9:00 AM   End Time:   9:29 AM  CPT code: 09070      Lizabeth Dan was evaluated through a patient-initiated, synchronous (real-time) audio-visual encounter.  Patient (or guardian if applicable) is aware that it is a billable service, which includes applicable co-pays, with coverage as determined by her insurance carrier. This visit was conducted with the patient's (and/or legal guardian's) verbal consent.  The patient was located in a [facility] where the provider was licensed to provide care.  The patient was located at: Home: [address] [place] [phone]  The provider was located at: [place]t Dept State[place]        ICD-10-CM     1. Eating disorder, unspecified type  F50.9         2. Adjustment disorder with depressed mood  F43.21         3. Morbid obesity  E66.01         Reason for Visit:  COUNSELING SESSION  REQUIRED FOR BARIATRIC SURGERY     Subjective (patient report):  Patient acknowledged taking medication and denied any side-effects.  Did not endorse any depression, anxiety, kd or psychotic symptoms.     Lizabeth presented with flat affect today.  She stated that she had been feeling sick and had slept through her last appointment that she missed with this provider.  She expressed that she has been losing some weight and has lost about 4 lbs in the past month.  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  She currently stays at home all day and does not have a job.  She lost her job when her car broke down in February and she couldn't get to her job.  She

## 2025-01-20 ENCOUNTER — TELEPHONE (OUTPATIENT)
Age: 30
End: 2025-01-20

## 2025-01-20 NOTE — TELEPHONE ENCOUNTER
Patient left voicemail requesting to reschedule appointment on 1/21/25; contacted patient; no answer; left voicemail

## 2025-01-22 ENCOUNTER — TELEMEDICINE (OUTPATIENT)
Age: 30
End: 2025-01-22

## 2025-01-22 ENCOUNTER — OFFICE VISIT (OUTPATIENT)
Age: 30
End: 2025-01-22

## 2025-01-22 DIAGNOSIS — E66.01 MORBID OBESITY: ICD-10-CM

## 2025-01-22 DIAGNOSIS — E66.01 MORBID OBESITY: Primary | ICD-10-CM

## 2025-01-22 DIAGNOSIS — F43.21 ADJUSTMENT DISORDER WITH DEPRESSED MOOD: ICD-10-CM

## 2025-01-22 DIAGNOSIS — F50.9 EATING DISORDER, UNSPECIFIED TYPE: Primary | ICD-10-CM

## 2025-01-22 NOTE — PROGRESS NOTES
[facility]  Counseling Progress Note    This note will not be viewable in [email or web address] for the following reason(s). This is a Psychotherapy Note. (Behavioral Health Providers Only)    Name: Lizabeth Dan  : 1995  MRN: [id number]  Date of Service: 2025  Location of Service: [place] for both provider and patient  Type of Service:  Individual Therapy (Virtual Visit)  Start Time:  9:03 AM   End Time:   9:26 AM  CPT code: 77679      Lizabeth Dan was evaluated through a patient-initiated, synchronous (real-time) audio-visual encounter.  Patient (or guardian if applicable) is aware that it is a billable service, which includes applicable co-pays, with coverage as determined by her insurance carrier. This visit was conducted with the patient's (and/or legal guardian's) verbal consent.  The patient was located in a state where the provider was licensed to provide care.  The patient was located at: Home: [address] [place] [phone]  The provider was located at: Work [place]        ICD-10-CM     1. Eating disorder, unspecified type  F50.9         2. Adjustment disorder with depressed mood  F43.21         3. Morbid obesity  E66.01         Reason for Visit:  COUNSELING SESSION 2/4 REQUIRED FOR BARIATRIC SURGERY     Subjective (patient report):  Patient acknowledged taking medication and denied any side-effects.  Did not endorse any depression, anxiety, kd or psychotic symptoms.     Lizabeth presented with slightly brighter affect today.  She expressed that she recently got a job (this week) and is working as a  at [employer].  Her hours will be 10-2 M-F.  She is looking forward to getting out of the house, making some money, and being more active.  She continues to care for her children ages 3 & 6.  The 3 year-old will be in  while she is working.  She discussed working on trying to improve her ability to eat 3 meals per day vs. Snacking throughout

## 2025-01-22 NOTE — PROGRESS NOTES
is eating 3 meals per day (B: eggs or protein shake; L: turkey sandwich; D: chix, rice, green beans). Snack choices include nutritgrain bar or granola bar. Pt is eating refined carbohydrate foods (bread, pasta, rice, potatoes) daily. Pt is eating sweets/desserts occasionally. Pt is using  healthy cooking methods. Pt is eating meals prepared outside of the home 1-2x week. Pt is drinking water. Pt reports no emotional eating.         Physical Activity/Exercise  We talked about the importance of increasing daily physical activity and beginning to develop an exercise regimen/routine. We talked about exercise as being an important part of long term weight loss after surgery.     Comments:  During class, I discussed with patient the importance of getting into an exercise routine.  Pt is currently walking 5x week around apartment complex for activity.  Pt has been encouraged to continue with consistent exercise.    Behavior Modification     We talked about how to eat more mindfully and identify emotional eating triggers. Tips and recommendations for how to make these changes were provided. Pt was encouraged to keep a food journal and record what they were taking in daily.     Reviewed all nutrition guidelines    Overall Assessment: Pt demonstrates appropriate lifestyle changes evidenced by reported changes and weight loss over the past 6 months. . Pt demonstrates understanding of nutrition guidelines for bariatric surgery. Appears to be an appropriate candidate at this time.        Patient-Set Goals:   1. Nutrition - continue with consistent meals  2. Exercise - continue with current exercise  3. Behavior -continue practicing drinking rule    Nichole Rosario, DOLLY  1/22/2025

## 2025-02-12 ENCOUNTER — TELEMEDICINE (OUTPATIENT)
Age: 30
End: 2025-02-12
Payer: COMMERCIAL

## 2025-02-12 DIAGNOSIS — E66.01 MORBID OBESITY: ICD-10-CM

## 2025-02-12 DIAGNOSIS — F50.9 EATING DISORDER, UNSPECIFIED TYPE: Primary | ICD-10-CM

## 2025-02-12 DIAGNOSIS — F43.21 ADJUSTMENT DISORDER WITH DEPRESSED MOOD: ICD-10-CM

## 2025-02-12 PROCEDURE — 90832 PSYTX W PT 30 MINUTES: CPT | Performed by: COUNSELOR

## 2025-02-12 NOTE — PROGRESS NOTES
primary goal  Continue to see patient for counseling to address behavioral changes, if patient decides that they would like to seek supportive counseling     Patient verbalized understanding of the information presented.     Next Session:  3/5/25  Virtual (Y/N): Y       Signature: Yvonne Carrillo LPC, NCC, CCTP      Date:  2/12/2025

## 2025-03-05 ENCOUNTER — TELEMEDICINE (OUTPATIENT)
Age: 30
End: 2025-03-05
Payer: COMMERCIAL

## 2025-03-05 DIAGNOSIS — F43.21 ADJUSTMENT DISORDER WITH DEPRESSED MOOD: ICD-10-CM

## 2025-03-05 DIAGNOSIS — E66.01 MORBID OBESITY: ICD-10-CM

## 2025-03-05 DIAGNOSIS — F50.9 EATING DISORDER, UNSPECIFIED TYPE: Primary | ICD-10-CM

## 2025-03-05 PROCEDURE — 90832 PSYTX W PT 30 MINUTES: CPT | Performed by: COUNSELOR

## 2025-03-05 NOTE — PROGRESS NOTES
self, place, time and situation. Patient response to intervention was appropriate. Patient progress is adequate. Protective factor: self-determination and positivity.     Assessment:              Risk Assessment (SI, HI, DV, unsafe environment):  Denies any SI, HI or DV                          If Yes, identify safety plan:   N/A     Clinical Intervention:  This therapist provided Lizabeth with empathic active listening.  This therapist also engaged her in discussing her anticipated surgery.  This therapist validated her improved mood since she has been working and also affirmed her progress with her diet.  Therapist confirmed that she will have supports to help her after surgery and ongoing support with her new diet.  Therapist wished her well and reminded her that she can continue to see this therapist in the future if any concerns arise after surgery.     Treatment Plan  Goal #1:  Reduce Emotional Eating   Objective #1:  Learning to keep a food diary, learning stress management skills, developing healthy behaviors and identifying difference between physical and emotional hunger.     Plans:  Continue to use Cognitive Behavioral Therapy and Dialectical Behavioral Therapy  Continue to work with patient on primary goal  Continue to see patient for counseling to address behavioral changes, if patient decides that they would like to seek supportive counseling     Patient verbalized understanding of the information presented.     Next Session:  TBD  Virtual (Y/N): N/A       Signature: Yvonne Carrillo LPC, NCC, CCTP      Date:  3/5/2025

## 2025-03-17 ENCOUNTER — TELEPHONE (OUTPATIENT)
Age: 30
End: 2025-03-17

## 2025-03-17 NOTE — TELEPHONE ENCOUNTER
Pt stated that she had completed her last requirement, remaining labs. Informed pt we would have have the lab results in order to schedule final review. Pt expressed understanding of all that was discussed.

## 2025-03-18 LAB
25(OH)D3+25(OH)D2 SERPL-MCNC: 7.1 NG/ML (ref 30–100)
ALBUMIN SERPL-MCNC: 3.8 G/DL (ref 4–5)
ALP SERPL-CCNC: 82 IU/L (ref 44–121)
ALT SERPL-CCNC: 13 IU/L (ref 0–32)
AST SERPL-CCNC: 17 IU/L (ref 0–40)
BASOPHILS # BLD AUTO: 0.1 X10E3/UL (ref 0–0.2)
BASOPHILS NFR BLD AUTO: 1 %
BILIRUB SERPL-MCNC: <0.2 MG/DL (ref 0–1.2)
BUN SERPL-MCNC: 11 MG/DL (ref 6–20)
BUN/CREAT SERPL: 19 (ref 9–23)
CALCIUM SERPL-MCNC: 9.2 MG/DL (ref 8.7–10.2)
CHLORIDE SERPL-SCNC: 99 MMOL/L (ref 96–106)
CO2 SERPL-SCNC: 23 MMOL/L (ref 20–29)
CREAT SERPL-MCNC: 0.58 MG/DL (ref 0.57–1)
EGFRCR SERPLBLD CKD-EPI 2021: 127 ML/MIN/1.73
EOSINOPHIL # BLD AUTO: 0.1 X10E3/UL (ref 0–0.4)
EOSINOPHIL NFR BLD AUTO: 1 %
ERYTHROCYTE [DISTWIDTH] IN BLOOD BY AUTOMATED COUNT: 13.1 % (ref 11.7–15.4)
FOLATE SERPL-MCNC: 3.6 NG/ML
GLOBULIN SER CALC-MCNC: 2.9 G/DL (ref 1.5–4.5)
GLUCOSE SERPL-MCNC: 81 MG/DL (ref 70–99)
HBA1C MFR BLD: 5.3 % (ref 4.8–5.6)
HCT VFR BLD AUTO: 36.7 % (ref 34–46.6)
HGB BLD-MCNC: 11.8 G/DL (ref 11.1–15.9)
IMM GRANULOCYTES # BLD AUTO: 0 X10E3/UL (ref 0–0.1)
IMM GRANULOCYTES NFR BLD AUTO: 0 %
IRON SATN MFR SERPL: 5 % (ref 15–55)
IRON SERPL-MCNC: 23 UG/DL (ref 27–159)
LYMPHOCYTES # BLD AUTO: 2.3 X10E3/UL (ref 0.7–3.1)
LYMPHOCYTES NFR BLD AUTO: 21 %
MCH RBC QN AUTO: 27.2 PG (ref 26.6–33)
MCHC RBC AUTO-ENTMCNC: 32.2 G/DL (ref 31.5–35.7)
MCV RBC AUTO: 85 FL (ref 79–97)
MONOCYTES # BLD AUTO: 0.6 X10E3/UL (ref 0.1–0.9)
MONOCYTES NFR BLD AUTO: 5 %
NEUTROPHILS # BLD AUTO: 7.9 X10E3/UL (ref 1.4–7)
NEUTROPHILS NFR BLD AUTO: 72 %
PLATELET # BLD AUTO: 447 X10E3/UL (ref 150–450)
POTASSIUM SERPL-SCNC: 4.4 MMOL/L (ref 3.5–5.2)
PROT SERPL-MCNC: 6.7 G/DL (ref 6–8.5)
RBC # BLD AUTO: 4.34 X10E6/UL (ref 3.77–5.28)
SODIUM SERPL-SCNC: 136 MMOL/L (ref 134–144)
TIBC SERPL-MCNC: 427 UG/DL (ref 250–450)
TSH SERPL DL<=0.005 MIU/L-ACNC: 1.43 UIU/ML (ref 0.45–4.5)
UIBC SERPL-MCNC: 404 UG/DL (ref 131–425)
VIT B12 SERPL-MCNC: 246 PG/ML (ref 232–1245)
WBC # BLD AUTO: 11 X10E3/UL (ref 3.4–10.8)

## 2025-03-19 LAB — UREA BREATH TEST QL: NEGATIVE

## 2025-03-20 ENCOUNTER — RESULTS FOLLOW-UP (OUTPATIENT)
Age: 30
End: 2025-03-20

## 2025-03-20 RX ORDER — ERGOCALCIFEROL 1.25 MG/1
50000 CAPSULE, LIQUID FILLED ORAL
Qty: 24 CAPSULE | Refills: 1 | Status: SHIPPED | OUTPATIENT
Start: 2025-03-20

## 2025-03-20 NOTE — TELEPHONE ENCOUNTER
Spoke with patient.  Reviewed labs.  Will send prescription for ergocalciferol 50,000 IUs twice weekly for 12 weeks.  Patient's iron is low.  Patient has a history of anemia.  Will start patient on Vitron-C daily.  Patient in agreement.

## 2025-03-26 ENCOUNTER — CLINICAL DOCUMENTATION (OUTPATIENT)
Age: 30
End: 2025-03-26

## 2025-03-26 ENCOUNTER — OFFICE VISIT (OUTPATIENT)
Age: 30
End: 2025-03-26
Payer: COMMERCIAL

## 2025-03-26 VITALS
HEART RATE: 81 BPM | OXYGEN SATURATION: 98 % | SYSTOLIC BLOOD PRESSURE: 118 MMHG | BODY MASS INDEX: 47.53 KG/M2 | DIASTOLIC BLOOD PRESSURE: 78 MMHG | WEIGHT: 278.4 LBS | TEMPERATURE: 98.2 F | RESPIRATION RATE: 16 BRPM | HEIGHT: 64 IN

## 2025-03-26 DIAGNOSIS — E66.01 MORBID OBESITY: Primary | ICD-10-CM

## 2025-03-26 PROCEDURE — 99203 OFFICE O/P NEW LOW 30 MIN: CPT | Performed by: SURGERY

## 2025-03-26 RX ORDER — SCOPOLAMINE 1 MG/3D
1 PATCH, EXTENDED RELEASE TRANSDERMAL
OUTPATIENT
Start: 2025-03-26 | End: 2025-03-29

## 2025-03-26 RX ORDER — SODIUM CHLORIDE 0.9 % (FLUSH) 0.9 %
5-40 SYRINGE (ML) INJECTION PRN
OUTPATIENT
Start: 2025-03-26

## 2025-03-26 RX ORDER — ACETAMINOPHEN 160 MG/5ML
1000 LIQUID ORAL ONCE
OUTPATIENT
Start: 2025-03-26 | End: 2025-03-26

## 2025-03-26 RX ORDER — SODIUM CHLORIDE, SODIUM LACTATE, POTASSIUM CHLORIDE, CALCIUM CHLORIDE 600; 310; 30; 20 MG/100ML; MG/100ML; MG/100ML; MG/100ML
INJECTION, SOLUTION INTRAVENOUS CONTINUOUS
OUTPATIENT
Start: 2025-03-26

## 2025-03-26 RX ORDER — ONDANSETRON 2 MG/ML
4 INJECTION INTRAMUSCULAR; INTRAVENOUS ONCE
OUTPATIENT
Start: 2025-03-26 | End: 2025-03-26

## 2025-03-26 RX ORDER — GABAPENTIN 250 MG/5ML
300 SOLUTION ORAL ONCE
OUTPATIENT
Start: 2025-03-26 | End: 2025-03-26

## 2025-03-26 RX ORDER — SODIUM CHLORIDE 0.9 % (FLUSH) 0.9 %
5-40 SYRINGE (ML) INJECTION EVERY 12 HOURS SCHEDULED
OUTPATIENT
Start: 2025-03-26

## 2025-03-26 RX ORDER — SODIUM CHLORIDE 9 MG/ML
INJECTION, SOLUTION INTRAVENOUS PRN
OUTPATIENT
Start: 2025-03-26

## 2025-03-26 ASSESSMENT — PATIENT HEALTH QUESTIONNAIRE - PHQ9
SUM OF ALL RESPONSES TO PHQ QUESTIONS 1-9: 0
SUM OF ALL RESPONSES TO PHQ QUESTIONS 1-9: 0
2. FEELING DOWN, DEPRESSED OR HOPELESS: NOT AT ALL
SUM OF ALL RESPONSES TO PHQ QUESTIONS 1-9: 0
SUM OF ALL RESPONSES TO PHQ QUESTIONS 1-9: 0
1. LITTLE INTEREST OR PLEASURE IN DOING THINGS: NOT AT ALL

## 2025-03-26 NOTE — PROGRESS NOTES
[facility] [place] [facility] History and Physical    History of Present Illness:      Lizabeth Dan is a 29 y.o. female who has been going through the process for bariatric surgery.  She has been considering gastric bypass and sleeve gastrectomy.  She would like to lose the maximal weight with surgery.  She has not had any new health issues since she started the bariatric surgery process.    Past Medical History:   Diagnosis Date    Abdominal pain, other specified site     Anemia     Keratosis pilaris 10/1/2009    Otitis media        Past Surgical History:   Procedure Laterality Date    ADENOIDECTOMY      COLONOSCOPY N/A 9/17/2020    COLONOSCOPY/EGD performed by Migue Walsh MD at [facility] ENDOSCOPY    GYN      HEENT      tubal/tonsils and adnoids    OTHER SURGICAL HISTORY      tonsilectomy childhood    OTHER SURGICAL HISTORY      left ovary cyst removal     TONSILLECTOMY      TYMPANOSTOMY TUBE PLACEMENT           Current Outpatient Medications:     vitamin D (ERGOCALCIFEROL) 1.25 MG (46870 UT) CAPS capsule, Take 1 capsule by mouth Twice a Week, Disp: 24 capsule, Rfl: 1    acetaminophen (TYLENOL) 500 MG tablet, Take 2 tablets by mouth 3 times daily as needed for Pain, Disp: 100 tablet, Rfl: 0    ondansetron (ZOFRAN-ODT) 4 MG disintegrating tablet, Take 1 tablet by mouth 3 times daily as needed for Nausea or Vomiting, Disp: 21 tablet, Rfl: 0    ibuprofen (ADVIL;MOTRIN) 800 MG tablet, Take 1 tablet by mouth every 8 hours as needed for Pain, Disp: 24 tablet, Rfl: 0    naproxen (NAPROSYN) 500 MG tablet, Take 1 tablet by mouth 2 times daily (Patient not taking: Reported on 8/22/2024), Disp: 30 tablet, Rfl: 0    Benzocaine-Menthol (CEPACOL) 6-10 MG LOZG lozenge, 1 lozenge by Transmucosal route as needed (Patient not taking: Reported on 8/22/2024), Disp: , Rfl:     hydrocortisone 2.5 % cream, Place rectally 4 times daily, Disp: , Rfl:     ibuprofen (ADVIL;MOTRIN) 600 MG tablet, Take 1 tablet by

## 2025-03-26 NOTE — PROGRESS NOTES
Submitted Authorization to [facility] via [email or web address] for LAPAROSCOPIC NEEL-EN-Y GASTRIC BYPASS request submitted for inpatient with Dr. HEREDIA     PENDING AUTH # [id number]

## 2025-03-26 NOTE — PROGRESS NOTES
Identified patient with two patient identifiers (name and ). Reviewed chart in preparation for visit and have obtained necessary documentation.    Lizabeth Dan is a 29 y.o. female  Chief Complaint   Patient presents with    Weight Management     Final review     /78 (BP Site: Left Upper Arm, Patient Position: Sitting, BP Cuff Size: Large Adult)   Pulse 81   Temp 98.2 °F (36.8 °C) (Oral)   Resp 16   Ht 1.626 m (5' 4\")   Wt 126.3 kg (278 lb 6.4 oz)   SpO2 98%   BMI 47.79 kg/m²     1. Have you been to the ER, urgent care clinic since your last visit?  Hospitalized since your last visit?no    2. Have you seen or consulted any other health care providers outside of the [facility] [place] [facility] since your last visit?  Include any pap smears or colon screening. no

## 2025-04-10 ENCOUNTER — TELEPHONE (OUTPATIENT)
Age: 30
End: 2025-04-10

## 2025-04-10 ENCOUNTER — PREP FOR PROCEDURE (OUTPATIENT)
Age: 30
End: 2025-04-10

## 2025-04-10 DIAGNOSIS — E66.01 MORBID OBESITY (HCC): ICD-10-CM

## 2025-04-10 NOTE — TELEPHONE ENCOUNTER
LM for patient to let her know her surgery letter has posted to her my chart and will go out in the mail. I reviewed the virtual pre op class that that won't show up in her my chart.  I informed her to be on the look out for an e-mail from Michelle in your junk/spam folder.  I explained what will be in the e-mail and to please reply to it so we know you received it.   I asked her to review her appointments and reach out to me as soon as she can if there is any scheduling conflicts, that way we can try to reschedule the appointment with out having to move her surgery.

## 2025-04-10 NOTE — TELEPHONE ENCOUNTER
Spoke to patient, I offered 5/6 for surgery and she accepted.  I let her know that I will be scheduling her pre-op appointments which are: virtual pre-op class, PAT, H&P and to meet with the doctor to sign consent.  That is any of these appointments are no showed or rescheduled it can push out her surgery date.  She understood.  I also let them know that I will be scheduling their follow up appointments after surgery.   That once everything is scheduled I will put all the information in a letter with dates, times, who they are going to see and if it is virtual or in person.  This letter will post to your my chart and I will send it out in the mail.  I will also call you once it is completed.  You will hear from me by end of day today.  She acknowledged ok and thank you

## 2025-04-10 NOTE — PERIOP NOTE
PAT: 4-16 @ 8;00      PAT  Received: Today  Clotilde Mckeon [place]; Ledy De Jesus  Patients surgery is scheduled for 5/6.  Needing PAT scheduled the week of 4/14 (if you schedule on 4/15 please schedule in the afternoon due to she has a class from 9-12)    Thanks  Clotilde

## 2025-04-16 ENCOUNTER — HOSPITAL ENCOUNTER (OUTPATIENT)
Age: 30
Discharge: HOME | End: 2025-04-19
Payer: COMMERCIAL

## 2025-04-16 ENCOUNTER — HOSPITAL ENCOUNTER (OUTPATIENT)
Facility: HOSPITAL | Age: 30
Discharge: HOME | End: 2025-04-19
Payer: COMMERCIAL

## 2025-04-16 VITALS
SYSTOLIC BLOOD PRESSURE: 117 MMHG | DIASTOLIC BLOOD PRESSURE: 80 MMHG | OXYGEN SATURATION: 100 % | HEIGHT: 64 IN | WEIGHT: 272.6 LBS | BODY MASS INDEX: 46.54 KG/M2 | TEMPERATURE: 98.2 F | HEART RATE: 78 BPM

## 2025-04-16 DIAGNOSIS — E66.01 MORBID OBESITY: ICD-10-CM

## 2025-04-16 LAB
ALBUMIN SERPL-MCNC: 3.2 G/DL (ref 3.5–5)
ALBUMIN/GLOB SERPL: 0.7 (ref 1.1–2.2)
ALP SERPL-CCNC: 100 U/L (ref 45–117)
ALT SERPL-CCNC: 16 U/L (ref 12–78)
ANION GAP SERPL CALC-SCNC: 6 MMOL/L (ref 2–12)
AST SERPL-CCNC: 12 U/L (ref 15–37)
BILIRUB SERPL-MCNC: 0.6 MG/DL (ref 0.2–1)
BUN SERPL-MCNC: 15 MG/DL (ref 6–20)
BUN/CREAT SERPL: 20 (ref 12–20)
CALCIUM SERPL-MCNC: 9.5 MG/DL (ref 8.5–10.1)
CHLORIDE SERPL-SCNC: 103 MMOL/L (ref 97–108)
CO2 SERPL-SCNC: 27 MMOL/L (ref 21–32)
CREAT SERPL-MCNC: 0.75 MG/DL (ref 0.55–1.02)
EKG ATRIAL RATE: 74 BPM
EKG DIAGNOSIS: NORMAL
EKG P AXIS: 53 DEGREES
EKG P-R INTERVAL: 168 MS
EKG Q-T INTERVAL: 382 MS
EKG QRS DURATION: 84 MS
EKG QTC CALCULATION (BAZETT): 424 MS
EKG R AXIS: 10 DEGREES
EKG T AXIS: 15 DEGREES
EKG VENTRICULAR RATE: 74 BPM
ERYTHROCYTE [DISTWIDTH] IN BLOOD BY AUTOMATED COUNT: 13.3 % (ref 11.5–14.5)
GLOBULIN SER CALC-MCNC: 4.4 G/DL (ref 2–4)
GLUCOSE SERPL-MCNC: 92 MG/DL (ref 65–100)
HCT VFR BLD AUTO: 39.9 % (ref 35–47)
HGB BLD-MCNC: 12.6 G/DL (ref 11.5–16)
MCH RBC QN AUTO: 25.9 PG (ref 26–34)
MCHC RBC AUTO-ENTMCNC: 31.6 G/DL (ref 30–36.5)
MCV RBC AUTO: 82.1 FL (ref 80–99)
NRBC # BLD: 0 K/UL (ref 0–0.01)
NRBC BLD-RTO: 0 PER 100 WBC
PLATELET # BLD AUTO: 482 K/UL (ref 150–400)
PMV BLD AUTO: 9.9 FL (ref 8.9–12.9)
POTASSIUM SERPL-SCNC: 4.3 MMOL/L (ref 3.5–5.1)
PROT SERPL-MCNC: 7.6 G/DL (ref 6.4–8.2)
RBC # BLD AUTO: 4.86 M/UL (ref 3.8–5.2)
SODIUM SERPL-SCNC: 136 MMOL/L (ref 136–145)
WBC # BLD AUTO: 9 K/UL (ref 3.6–11)

## 2025-04-16 PROCEDURE — 85027 COMPLETE CBC AUTOMATED: CPT

## 2025-04-16 PROCEDURE — 71046 X-RAY EXAM CHEST 2 VIEWS: CPT

## 2025-04-16 PROCEDURE — 80053 COMPREHEN METABOLIC PANEL: CPT

## 2025-04-16 PROCEDURE — 93010 ELECTROCARDIOGRAM REPORT: CPT | Performed by: INTERNAL MEDICINE

## 2025-04-16 PROCEDURE — 93005 ELECTROCARDIOGRAM TRACING: CPT

## 2025-04-16 NOTE — PERIOP NOTE
Preoperative instructions reviewed with patient.  Patient given SSI infection FAQS sheet.  Given two bottles of skin prep chlorhexidine soap; given written and verbal instructions on use. Patient was given the opportunity to ask questions on the information provided.  Patient instructed to [facility] upon leaving PAT department.    
assistance quitting, please contact your primary care provider.   Let your surgeon know if you develop any illness before surgery such as a cold, cough, sore throat, fever, nausea or vomiting. Also notify your surgeon’s office of any signs of infections including skin rashes, new wounds or dental infections.    Discuss visiting policies with your care team.   Prepare your home for recovery with clean sheets and clothes.      Medications  Bring a list of all your medications and their doses, including over the counter supplements.    If you are instructed to bring home medications, please give them to your nurse as the nursing staff will administer them. If bringing them in, they must be in the original pill bottle(s). Follow the instructions provided to you by your care team regarding which medications to take prior to surgery.   Anticoagulants (blood thinners)   Weight loss medications   Diabetic medications   Blood pressure medications      Eating And Drinking Before Surgery  If your surgery requires a bowel prep, follow prep instructions given to you by your surgeon. If you have not received any instructions, please contact your surgeon’s office for further guidance.    If your surgeon has advised you to take supplement drinks, follow their instructions.    You can drink 12 ounces of clear liquids every four hours from midnight until one hours prior to your arrival at the hospital on the day of your surgery. Clear liquids include:   Water   Fruit juices without pulp (i.e., apple juice)   Carbonated beverages   Black coffee (no cream/milk)   Tea (no cream/milk)   Gatorade    Avoid red drinks.    Do NOT drink alcohol.    If you received specific instructions from your surgeon or hospital for eating or drinking, follow those instructions.        Preparing For Surgery  You can play an important role in your care by carefully washing before surgery.    If you were given surgical skin cleansing soap -- Chlorhexidine

## 2025-04-23 ENCOUNTER — TELEMEDICINE (OUTPATIENT)
Age: 30
End: 2025-04-23

## 2025-04-23 VITALS — BODY MASS INDEX: 46.44 KG/M2 | HEIGHT: 64 IN | WEIGHT: 272 LBS

## 2025-04-23 DIAGNOSIS — E66.01 MORBID OBESITY (HCC): Primary | ICD-10-CM

## 2025-04-23 DIAGNOSIS — G89.18 POST-OP PAIN: ICD-10-CM

## 2025-04-23 PROCEDURE — 99024 POSTOP FOLLOW-UP VISIT: CPT | Performed by: NURSE PRACTITIONER

## 2025-04-23 RX ORDER — GABAPENTIN 100 MG/1
100-200 CAPSULE ORAL 3 TIMES DAILY
Qty: 30 CAPSULE | Refills: 0 | Status: SHIPPED | OUTPATIENT
Start: 2025-04-23 | End: 2025-04-28

## 2025-04-23 RX ORDER — OMEPRAZOLE 40 MG/1
40 CAPSULE, DELAYED RELEASE ORAL
Qty: 90 CAPSULE | Refills: 1 | Status: SHIPPED | OUTPATIENT
Start: 2025-04-23

## 2025-04-23 RX ORDER — ONDANSETRON 4 MG/1
4 TABLET, FILM COATED ORAL EVERY 8 HOURS PRN
Qty: 30 TABLET | Refills: 0 | Status: SHIPPED | OUTPATIENT
Start: 2025-04-23

## 2025-04-23 RX ORDER — POLYETHYLENE GLYCOL 3350 17 G/17G
17 POWDER, FOR SOLUTION ORAL DAILY
Qty: 1530 G | Refills: 0 | Status: SHIPPED | OUTPATIENT
Start: 2025-04-23 | End: 2025-07-22

## 2025-04-23 ASSESSMENT — PATIENT HEALTH QUESTIONNAIRE - PHQ9
SUM OF ALL RESPONSES TO PHQ QUESTIONS 1-9: 0
1. LITTLE INTEREST OR PLEASURE IN DOING THINGS: NOT AT ALL
SUM OF ALL RESPONSES TO PHQ QUESTIONS 1-9: 0
SUM OF ALL RESPONSES TO PHQ QUESTIONS 1-9: 0
2. FEELING DOWN, DEPRESSED OR HOPELESS: NOT AT ALL
SUM OF ALL RESPONSES TO PHQ QUESTIONS 1-9: 0

## 2025-04-23 ASSESSMENT — PAIN SCALES - GENERAL: PAINLEVEL_OUTOF10: 0

## 2025-04-23 NOTE — PROGRESS NOTES
Identified patient with two patient identifiers (name and ). Reviewed chart in preparation for visit and have obtained necessary documentation.    Lizabeth Dan is a 30 y.o. female  Chief Complaint   Patient presents with    H&P     2025 LAPAROSCOPIC NEEL-EN-Y GASTRIC BYPASS,  ESOPHAGOGASTRODUODENOSCOPY   (E R A S)  with Dr. Walsh     Ht 1.626 m (5' 4\")   Wt 123.4 kg (272 lb)   LMP 04/10/2025 (Exact Date)   BMI 46.69 kg/m²     1. Have you been to the ER, urgent care clinic since your last visit?  Hospitalized since your last visit?no    2. Have you seen or consulted any other health care providers outside of the [facility] since your last visit?  Include any pap smears or colon screening. no

## 2025-04-23 NOTE — PROGRESS NOTES
Ht 1.626 m (5' 4\")   Wt 123.4 kg (272 lb)   LMP 04/10/2025 (Exact Date)   BMI 46.69 kg/m²       Plan:   1/2. Morbid Obesity- Patient is schedule for Gastric bypass with Dr.Nathan Walsh on 5/6/2025 at [facility]. Counseled patient in regard to post diet restrictions, follow- up office visits, follow- up care. Patient as received educational booklet and vitamin list. Reviewed liver shrinking diet. Start date for Liver shrinking diet 4/21/2025  ERAS protocol reviewed:  Acetaminophen 1000 mg at noon and 8 pm day before surgery and may have clear liquids (no caffeine, no ETOH, no carbonation and calorie free) until 3 hours prior to surgery   Preadmission testing results reviewed with patient   Post operative prescriptions sent to pharmacy on file for AFTER surgery:    Acid suppression Prilosec 40 mg  x 30 days, then reassess need    Antiemetic Zofran 4 mg every 8 hours as needed for nausea #30   Antispasmodic na  Laxative:  Miralax 1 packet every day   DVT prophylaxis:     early ambulation and mechanical compression, non-smoker for at least 90 days   Post op pain management:  Gabapentin 100-200 mg q 8 hours prn pain x 5 days; acetaminophen 1000 mg po q 8 hours prn; abdominal support / splinting; heating pad prn   Reviewed with patient that lifelong vitamin supplementation is recommended by provider as well as risks of non-compliance.    Lizabeth Dan, was evaluated through a synchronous (real-time) audio-video encounter. The patient (or guardian if applicable) is aware that this is a billable service, which includes applicable co-pays. This Virtual Visit was conducted with patient's (and/or legal guardian's) consent. Patient identification was verified, and a caregiver was present when appropriate.   The patient was located at Home: [address] [place] [address]  [address]  [facility] [address]  Provider was located at Facility (Appt Dept): [address]  [address]  [place] [address]  Confirm you are appropriately

## 2025-04-30 ENCOUNTER — OFFICE VISIT (OUTPATIENT)
Age: 30
End: 2025-04-30
Payer: COMMERCIAL

## 2025-04-30 VITALS
DIASTOLIC BLOOD PRESSURE: 78 MMHG | HEIGHT: 64 IN | HEART RATE: 98 BPM | SYSTOLIC BLOOD PRESSURE: 110 MMHG | BODY MASS INDEX: 46.03 KG/M2 | WEIGHT: 269.6 LBS | RESPIRATION RATE: 18 BRPM | OXYGEN SATURATION: 99 % | TEMPERATURE: 98.2 F

## 2025-04-30 DIAGNOSIS — E66.01 MORBID OBESITY (HCC): Primary | ICD-10-CM

## 2025-04-30 PROCEDURE — 99214 OFFICE O/P EST MOD 30 MIN: CPT | Performed by: SURGERY

## 2025-04-30 NOTE — PROGRESS NOTES
Identified patient with two patient identifiers (name and ). Reviewed chart in preparation for visit and have obtained necessary documentation.    Lizabeth Dan is a 30 y.o. female  Chief Complaint   Patient presents with    consent     /78   Pulse 98   Temp 98.2 °F (36.8 °C) (Oral)   Resp 18   Ht 1.626 m (5' 4\")   Wt 122.3 kg (269 lb 9.6 oz)   LMP 04/10/2025 (Exact Date)   SpO2 99%   BMI 46.28 kg/m²     1. Have you been to the ER, urgent care clinic since your last visit?  Hospitalized since your last visit?no    2. Have you seen or consulted any other health care providers outside of the [facility] since your last visit?  Include any pap smears or colon screening. no  
diagnosis and treatment plan    Ms. Dan has a reminder for a \"due or due soon\" health maintenance. I have asked that she contact her primary care provider for follow-up on this health maintenance.

## 2025-05-01 ENCOUNTER — TELEPHONE (OUTPATIENT)
Age: 30
End: 2025-05-01

## 2025-05-01 NOTE — TELEPHONE ENCOUNTER
Called posting to change patient surgery from patient admit to outpatient observation.  I call and left a VM for Mariah letting her know I updated that and to call me if there was anything else that was needed.  I left my direct phone number.

## 2025-05-05 ENCOUNTER — ANESTHESIA EVENT (OUTPATIENT)
Facility: HOSPITAL | Age: 30
DRG: 403 | End: 2025-05-05
Payer: COMMERCIAL

## 2025-05-06 ENCOUNTER — ANESTHESIA (OUTPATIENT)
Facility: HOSPITAL | Age: 30
DRG: 403 | End: 2025-05-06
Payer: COMMERCIAL

## 2025-05-06 ENCOUNTER — HOSPITAL ENCOUNTER (INPATIENT)
Facility: HOSPITAL | Age: 30
LOS: 1 days | Discharge: HOME | DRG: 403 | End: 2025-05-07
Attending: SURGERY | Admitting: SURGERY
Payer: COMMERCIAL

## 2025-05-06 DIAGNOSIS — E66.01 MORBID OBESITY WITH BMI OF 45.0-49.9, ADULT (HCC): Primary | ICD-10-CM

## 2025-05-06 LAB — HCG UR QL: NEGATIVE

## 2025-05-06 PROCEDURE — 2500000003 HC RX 250 WO HCPCS: Performed by: SURGERY

## 2025-05-06 PROCEDURE — 2580000003 HC RX 258: Performed by: SURGERY

## 2025-05-06 PROCEDURE — 7100000001 HC PACU RECOVERY - ADDTL 15 MIN: Performed by: SURGERY

## 2025-05-06 PROCEDURE — 6360000002 HC RX W HCPCS: Performed by: SURGERY

## 2025-05-06 PROCEDURE — 1200000000 HC SEMI PRIVATE

## 2025-05-06 PROCEDURE — 6360000002 HC RX W HCPCS: Performed by: ANESTHESIOLOGY

## 2025-05-06 PROCEDURE — 88307 TISSUE EXAM BY PATHOLOGIST: CPT

## 2025-05-06 PROCEDURE — 2580000003 HC RX 258: Performed by: NURSE ANESTHETIST, CERTIFIED REGISTERED

## 2025-05-06 PROCEDURE — 3700000001 HC ADD 15 MINUTES (ANESTHESIA): Performed by: SURGERY

## 2025-05-06 PROCEDURE — 3600000004 HC SURGERY LEVEL 4 BASE: Performed by: SURGERY

## 2025-05-06 PROCEDURE — 7100000000 HC PACU RECOVERY - FIRST 15 MIN: Performed by: SURGERY

## 2025-05-06 PROCEDURE — 3700000000 HC ANESTHESIA ATTENDED CARE: Performed by: SURGERY

## 2025-05-06 PROCEDURE — 2720000010 HC SURG SUPPLY STERILE: Performed by: SURGERY

## 2025-05-06 PROCEDURE — 6360000002 HC RX W HCPCS: Performed by: NURSE ANESTHETIST, CERTIFIED REGISTERED

## 2025-05-06 PROCEDURE — 2709999900 HC NON-CHARGEABLE SUPPLY: Performed by: SURGERY

## 2025-05-06 PROCEDURE — 3600000014 HC SURGERY LEVEL 4 ADDTL 15MIN: Performed by: SURGERY

## 2025-05-06 PROCEDURE — 6370000000 HC RX 637 (ALT 250 FOR IP): Performed by: SURGERY

## 2025-05-06 PROCEDURE — 81025 URINE PREGNANCY TEST: CPT

## 2025-05-06 PROCEDURE — 0DJ08ZZ INSPECTION OF UPPER INTESTINAL TRACT, VIA NATURAL OR ARTIFICIAL OPENING ENDOSCOPIC: ICD-10-PCS | Performed by: SURGERY

## 2025-05-06 PROCEDURE — 2500000003 HC RX 250 WO HCPCS: Performed by: NURSE ANESTHETIST, CERTIFIED REGISTERED

## 2025-05-06 PROCEDURE — 2500000003 HC RX 250 WO HCPCS: Performed by: ANESTHESIOLOGY

## 2025-05-06 PROCEDURE — 0D164ZA BYPASS STOMACH TO JEJUNUM, PERCUTANEOUS ENDOSCOPIC APPROACH: ICD-10-PCS | Performed by: SURGERY

## 2025-05-06 RX ORDER — SODIUM CHLORIDE 9 MG/ML
INJECTION, SOLUTION INTRAVENOUS PRN
Status: DISCONTINUED | OUTPATIENT
Start: 2025-05-06 | End: 2025-05-06 | Stop reason: HOSPADM

## 2025-05-06 RX ORDER — ACETAMINOPHEN 160 MG/5ML
1000 SUSPENSION ORAL ONCE
Status: COMPLETED | OUTPATIENT
Start: 2025-05-06 | End: 2025-05-06

## 2025-05-06 RX ORDER — ONDANSETRON 2 MG/ML
4 INJECTION INTRAMUSCULAR; INTRAVENOUS ONCE
Status: DISCONTINUED | OUTPATIENT
Start: 2025-05-06 | End: 2025-05-06 | Stop reason: HOSPADM

## 2025-05-06 RX ORDER — SUCCINYLCHOLINE/SOD CL,ISO/PF 200MG/10ML
SYRINGE (ML) INTRAVENOUS
Status: DISCONTINUED | OUTPATIENT
Start: 2025-05-06 | End: 2025-05-06 | Stop reason: SDUPTHER

## 2025-05-06 RX ORDER — METRONIDAZOLE 500 MG/100ML
500 INJECTION, SOLUTION INTRAVENOUS
Status: COMPLETED | OUTPATIENT
Start: 2025-05-06 | End: 2025-05-06

## 2025-05-06 RX ORDER — MIDAZOLAM HYDROCHLORIDE 1 MG/ML
INJECTION, SOLUTION INTRAMUSCULAR; INTRAVENOUS
Status: DISCONTINUED | OUTPATIENT
Start: 2025-05-06 | End: 2025-05-06 | Stop reason: SDUPTHER

## 2025-05-06 RX ORDER — SODIUM CHLORIDE 0.9 % (FLUSH) 0.9 %
5-40 SYRINGE (ML) INJECTION EVERY 12 HOURS SCHEDULED
Status: DISCONTINUED | OUTPATIENT
Start: 2025-05-06 | End: 2025-05-06 | Stop reason: HOSPADM

## 2025-05-06 RX ORDER — PHENYLEPHRINE HCL IN 0.9% NACL 0.4MG/10ML
SYRINGE (ML) INTRAVENOUS
Status: DISCONTINUED | OUTPATIENT
Start: 2025-05-06 | End: 2025-05-06 | Stop reason: SDUPTHER

## 2025-05-06 RX ORDER — ONDANSETRON 4 MG/1
4 TABLET, ORALLY DISINTEGRATING ORAL EVERY 8 HOURS PRN
Status: DISCONTINUED | OUTPATIENT
Start: 2025-05-06 | End: 2025-05-07 | Stop reason: HOSPADM

## 2025-05-06 RX ORDER — DEXAMETHASONE SODIUM PHOSPHATE 4 MG/ML
INJECTION, SOLUTION INTRA-ARTICULAR; INTRALESIONAL; INTRAMUSCULAR; INTRAVENOUS; SOFT TISSUE
Status: DISCONTINUED | OUTPATIENT
Start: 2025-05-06 | End: 2025-05-06 | Stop reason: SDUPTHER

## 2025-05-06 RX ORDER — DIPHENHYDRAMINE HYDROCHLORIDE 50 MG/ML
25 INJECTION, SOLUTION INTRAMUSCULAR; INTRAVENOUS EVERY 6 HOURS PRN
Status: DISCONTINUED | OUTPATIENT
Start: 2025-05-06 | End: 2025-05-07 | Stop reason: HOSPADM

## 2025-05-06 RX ORDER — SODIUM CHLORIDE 9 MG/ML
INJECTION, SOLUTION INTRAVENOUS PRN
Status: DISCONTINUED | OUTPATIENT
Start: 2025-05-06 | End: 2025-05-07 | Stop reason: HOSPADM

## 2025-05-06 RX ORDER — FENTANYL CITRATE 50 UG/ML
INJECTION, SOLUTION INTRAMUSCULAR; INTRAVENOUS
Status: DISCONTINUED | OUTPATIENT
Start: 2025-05-06 | End: 2025-05-06 | Stop reason: SDUPTHER

## 2025-05-06 RX ORDER — PROPOFOL 10 MG/ML
INJECTION, EMULSION INTRAVENOUS
Status: DISCONTINUED | OUTPATIENT
Start: 2025-05-06 | End: 2025-05-06 | Stop reason: SDUPTHER

## 2025-05-06 RX ORDER — DIPHENHYDRAMINE HCL 25 MG
25 CAPSULE ORAL EVERY 6 HOURS PRN
Status: DISCONTINUED | OUTPATIENT
Start: 2025-05-06 | End: 2025-05-07 | Stop reason: HOSPADM

## 2025-05-06 RX ORDER — MEPERIDINE HYDROCHLORIDE 25 MG/ML
12.5 INJECTION INTRAMUSCULAR; INTRAVENOUS; SUBCUTANEOUS EVERY 5 MIN PRN
Status: DISCONTINUED | OUTPATIENT
Start: 2025-05-06 | End: 2025-05-06 | Stop reason: HOSPADM

## 2025-05-06 RX ORDER — FENTANYL CITRATE 50 UG/ML
50 INJECTION, SOLUTION INTRAMUSCULAR; INTRAVENOUS EVERY 5 MIN PRN
Status: DISCONTINUED | OUTPATIENT
Start: 2025-05-06 | End: 2025-05-06 | Stop reason: HOSPADM

## 2025-05-06 RX ORDER — BUPIVACAINE HYDROCHLORIDE AND EPINEPHRINE 5; 5 MG/ML; UG/ML
INJECTION, SOLUTION EPIDURAL; INTRACAUDAL; PERINEURAL PRN
Status: DISCONTINUED | OUTPATIENT
Start: 2025-05-06 | End: 2025-05-06 | Stop reason: HOSPADM

## 2025-05-06 RX ORDER — GABAPENTIN 300 MG/1
300 CAPSULE ORAL 2 TIMES DAILY
Status: DISCONTINUED | OUTPATIENT
Start: 2025-05-06 | End: 2025-05-07 | Stop reason: HOSPADM

## 2025-05-06 RX ORDER — PANTOPRAZOLE SODIUM 40 MG/1
40 TABLET, DELAYED RELEASE ORAL DAILY
Status: DISCONTINUED | OUTPATIENT
Start: 2025-05-06 | End: 2025-05-07 | Stop reason: HOSPADM

## 2025-05-06 RX ORDER — SODIUM CHLORIDE 0.9 % (FLUSH) 0.9 %
5-40 SYRINGE (ML) INJECTION PRN
Status: DISCONTINUED | OUTPATIENT
Start: 2025-05-06 | End: 2025-05-06 | Stop reason: HOSPADM

## 2025-05-06 RX ORDER — ACETAMINOPHEN 325 MG/1
650 TABLET ORAL EVERY 6 HOURS
Status: DISCONTINUED | OUTPATIENT
Start: 2025-05-06 | End: 2025-05-07 | Stop reason: HOSPADM

## 2025-05-06 RX ORDER — ONDANSETRON 2 MG/ML
4 INJECTION INTRAMUSCULAR; INTRAVENOUS EVERY 6 HOURS PRN
Status: DISCONTINUED | OUTPATIENT
Start: 2025-05-06 | End: 2025-05-07 | Stop reason: HOSPADM

## 2025-05-06 RX ORDER — SIMETHICONE 80 MG
80 TABLET,CHEWABLE ORAL EVERY 6 HOURS PRN
Status: DISCONTINUED | OUTPATIENT
Start: 2025-05-06 | End: 2025-05-07 | Stop reason: HOSPADM

## 2025-05-06 RX ORDER — HYOSCYAMINE SULFATE 0.12 MG/1
0.12 TABLET SUBLINGUAL EVERY 4 HOURS PRN
Status: DISCONTINUED | OUTPATIENT
Start: 2025-05-06 | End: 2025-05-07 | Stop reason: HOSPADM

## 2025-05-06 RX ORDER — SCOPOLAMINE 1 MG/3D
1 PATCH, EXTENDED RELEASE TRANSDERMAL
Status: DISCONTINUED | OUTPATIENT
Start: 2025-05-06 | End: 2025-05-06

## 2025-05-06 RX ORDER — ONDANSETRON 2 MG/ML
INJECTION INTRAMUSCULAR; INTRAVENOUS
Status: DISCONTINUED | OUTPATIENT
Start: 2025-05-06 | End: 2025-05-06 | Stop reason: SDUPTHER

## 2025-05-06 RX ORDER — HYDROMORPHONE HYDROCHLORIDE 1 MG/ML
1 INJECTION, SOLUTION INTRAMUSCULAR; INTRAVENOUS; SUBCUTANEOUS
Status: DISCONTINUED | OUTPATIENT
Start: 2025-05-06 | End: 2025-05-07 | Stop reason: HOSPADM

## 2025-05-06 RX ORDER — SCOPOLAMINE 1 MG/3D
1 PATCH, EXTENDED RELEASE TRANSDERMAL
Status: DISCONTINUED | OUTPATIENT
Start: 2025-05-06 | End: 2025-05-07 | Stop reason: HOSPADM

## 2025-05-06 RX ORDER — PROCHLORPERAZINE EDISYLATE 5 MG/ML
5 INJECTION INTRAMUSCULAR; INTRAVENOUS
Status: DISCONTINUED | OUTPATIENT
Start: 2025-05-06 | End: 2025-05-06 | Stop reason: HOSPADM

## 2025-05-06 RX ORDER — HYDROMORPHONE HYDROCHLORIDE 1 MG/ML
0.5 INJECTION, SOLUTION INTRAMUSCULAR; INTRAVENOUS; SUBCUTANEOUS EVERY 5 MIN PRN
Status: COMPLETED | OUTPATIENT
Start: 2025-05-06 | End: 2025-05-06

## 2025-05-06 RX ORDER — SODIUM CHLORIDE, SODIUM LACTATE, POTASSIUM CHLORIDE, CALCIUM CHLORIDE 600; 310; 30; 20 MG/100ML; MG/100ML; MG/100ML; MG/100ML
INJECTION, SOLUTION INTRAVENOUS CONTINUOUS
Status: DISCONTINUED | OUTPATIENT
Start: 2025-05-06 | End: 2025-05-06 | Stop reason: HOSPADM

## 2025-05-06 RX ORDER — SODIUM CHLORIDE 0.9 % (FLUSH) 0.9 %
5-40 SYRINGE (ML) INJECTION PRN
Status: DISCONTINUED | OUTPATIENT
Start: 2025-05-06 | End: 2025-05-07 | Stop reason: HOSPADM

## 2025-05-06 RX ORDER — NALOXONE HYDROCHLORIDE 0.4 MG/ML
INJECTION, SOLUTION INTRAMUSCULAR; INTRAVENOUS; SUBCUTANEOUS PRN
Status: DISCONTINUED | OUTPATIENT
Start: 2025-05-06 | End: 2025-05-06 | Stop reason: HOSPADM

## 2025-05-06 RX ORDER — PROCHLORPERAZINE EDISYLATE 5 MG/ML
10 INJECTION INTRAMUSCULAR; INTRAVENOUS EVERY 6 HOURS PRN
Status: DISCONTINUED | OUTPATIENT
Start: 2025-05-06 | End: 2025-05-07 | Stop reason: HOSPADM

## 2025-05-06 RX ORDER — ONDANSETRON 2 MG/ML
4 INJECTION INTRAMUSCULAR; INTRAVENOUS
Status: COMPLETED | OUTPATIENT
Start: 2025-05-06 | End: 2025-05-06

## 2025-05-06 RX ORDER — HYDRALAZINE HYDROCHLORIDE 20 MG/ML
20 INJECTION INTRAMUSCULAR; INTRAVENOUS EVERY 6 HOURS PRN
Status: DISCONTINUED | OUTPATIENT
Start: 2025-05-06 | End: 2025-05-07 | Stop reason: HOSPADM

## 2025-05-06 RX ORDER — SODIUM CHLORIDE, SODIUM LACTATE, POTASSIUM CHLORIDE, CALCIUM CHLORIDE 600; 310; 30; 20 MG/100ML; MG/100ML; MG/100ML; MG/100ML
INJECTION, SOLUTION INTRAVENOUS
Status: DISCONTINUED | OUTPATIENT
Start: 2025-05-06 | End: 2025-05-06 | Stop reason: SDUPTHER

## 2025-05-06 RX ORDER — GABAPENTIN 250 MG/5ML
300 SOLUTION ORAL ONCE
Status: COMPLETED | OUTPATIENT
Start: 2025-05-06 | End: 2025-05-06

## 2025-05-06 RX ORDER — SODIUM CHLORIDE, SODIUM LACTATE, POTASSIUM CHLORIDE, CALCIUM CHLORIDE 600; 310; 30; 20 MG/100ML; MG/100ML; MG/100ML; MG/100ML
INJECTION, SOLUTION INTRAVENOUS CONTINUOUS
Status: DISCONTINUED | OUTPATIENT
Start: 2025-05-06 | End: 2025-05-07 | Stop reason: HOSPADM

## 2025-05-06 RX ORDER — LIDOCAINE HYDROCHLORIDE ANHYDROUS AND DEXTROSE MONOHYDRATE 5; 400 G/100ML; MG/100ML
INJECTION, SOLUTION INTRAVENOUS
Status: DISCONTINUED | OUTPATIENT
Start: 2025-05-06 | End: 2025-05-06 | Stop reason: SDUPTHER

## 2025-05-06 RX ORDER — MAGNESIUM SULFATE HEPTAHYDRATE 40 MG/ML
INJECTION, SOLUTION INTRAVENOUS
Status: DISCONTINUED | OUTPATIENT
Start: 2025-05-06 | End: 2025-05-06 | Stop reason: SDUPTHER

## 2025-05-06 RX ORDER — SODIUM CHLORIDE 0.9 % (FLUSH) 0.9 %
5-40 SYRINGE (ML) INJECTION EVERY 12 HOURS SCHEDULED
Status: DISCONTINUED | OUTPATIENT
Start: 2025-05-06 | End: 2025-05-07 | Stop reason: HOSPADM

## 2025-05-06 RX ORDER — ROCURONIUM BROMIDE 10 MG/ML
INJECTION, SOLUTION INTRAVENOUS
Status: DISCONTINUED | OUTPATIENT
Start: 2025-05-06 | End: 2025-05-06 | Stop reason: SDUPTHER

## 2025-05-06 RX ORDER — LIDOCAINE HYDROCHLORIDE 20 MG/ML
INJECTION, SOLUTION EPIDURAL; INFILTRATION; INTRACAUDAL; PERINEURAL
Status: DISCONTINUED | OUTPATIENT
Start: 2025-05-06 | End: 2025-05-06 | Stop reason: SDUPTHER

## 2025-05-06 RX ADMIN — ONDANSETRON 4 MG: 2 INJECTION, SOLUTION INTRAMUSCULAR; INTRAVENOUS at 10:10

## 2025-05-06 RX ADMIN — ROCURONIUM BROMIDE 10 MG: 10 INJECTION, SOLUTION INTRAVENOUS at 09:30

## 2025-05-06 RX ADMIN — ONDANSETRON 4 MG: 2 INJECTION INTRAMUSCULAR; INTRAVENOUS at 14:56

## 2025-05-06 RX ADMIN — LIDOCAINE HYDROCHLORIDE 100 MG: 20 INJECTION, SOLUTION EPIDURAL; INFILTRATION; INTRACAUDAL; PERINEURAL at 07:36

## 2025-05-06 RX ADMIN — LIDOCAINE HYDROCHLORIDE 1 MG/KG/HR: 4 INJECTION, SOLUTION INTRAVENOUS at 07:45

## 2025-05-06 RX ADMIN — ONDANSETRON 4 MG: 2 INJECTION, SOLUTION INTRAMUSCULAR; INTRAVENOUS at 10:51

## 2025-05-06 RX ADMIN — ROCURONIUM BROMIDE 10 MG: 10 INJECTION, SOLUTION INTRAVENOUS at 08:15

## 2025-05-06 RX ADMIN — HYDROMORPHONE HYDROCHLORIDE 1 MG: 1 INJECTION, SOLUTION INTRAMUSCULAR; INTRAVENOUS; SUBCUTANEOUS at 14:56

## 2025-05-06 RX ADMIN — HYDROMORPHONE HYDROCHLORIDE 1 MG: 1 INJECTION, SOLUTION INTRAMUSCULAR; INTRAVENOUS; SUBCUTANEOUS at 20:14

## 2025-05-06 RX ADMIN — SODIUM CHLORIDE, POTASSIUM CHLORIDE, SODIUM LACTATE AND CALCIUM CHLORIDE: 600; 310; 30; 20 INJECTION, SOLUTION INTRAVENOUS at 07:30

## 2025-05-06 RX ADMIN — FENTANYL CITRATE 50 MCG: 50 INJECTION INTRAMUSCULAR; INTRAVENOUS at 10:50

## 2025-05-06 RX ADMIN — DEXAMETHASONE SODIUM PHOSPHATE 4 MG: 4 INJECTION INTRA-ARTICULAR; INTRALESIONAL; INTRAMUSCULAR; INTRAVENOUS; SOFT TISSUE at 07:36

## 2025-05-06 RX ADMIN — Medication 160 MG: at 07:36

## 2025-05-06 RX ADMIN — MIDAZOLAM 2 MG: 1 INJECTION INTRAMUSCULAR; INTRAVENOUS at 07:30

## 2025-05-06 RX ADMIN — PANTOPRAZOLE SODIUM 40 MG: 40 TABLET, DELAYED RELEASE ORAL at 20:20

## 2025-05-06 RX ADMIN — GABAPENTIN 300 MG: 250 SOLUTION ORAL at 06:52

## 2025-05-06 RX ADMIN — SUGAMMADEX 200 MG: 100 INJECTION, SOLUTION INTRAVENOUS at 10:14

## 2025-05-06 RX ADMIN — Medication 200 MCG: at 08:10

## 2025-05-06 RX ADMIN — HYDROMORPHONE HYDROCHLORIDE 0.5 MG: 1 INJECTION, SOLUTION INTRAMUSCULAR; INTRAVENOUS; SUBCUTANEOUS at 11:50

## 2025-05-06 RX ADMIN — ACETAMINOPHEN 650 MG: 325 TABLET ORAL at 17:31

## 2025-05-06 RX ADMIN — SODIUM CHLORIDE, SODIUM LACTATE, POTASSIUM CHLORIDE, AND CALCIUM CHLORIDE: .6; .31; .03; .02 INJECTION, SOLUTION INTRAVENOUS at 11:13

## 2025-05-06 RX ADMIN — MAGNESIUM SULFATE HEPTAHYDRATE 2000 MG: 40 INJECTION, SOLUTION INTRAVENOUS at 07:45

## 2025-05-06 RX ADMIN — PROPOFOL 200 MG: 10 INJECTION, EMULSION INTRAVENOUS at 07:36

## 2025-05-06 RX ADMIN — FENTANYL CITRATE 50 MCG: 50 INJECTION INTRAMUSCULAR; INTRAVENOUS at 11:20

## 2025-05-06 RX ADMIN — SODIUM CHLORIDE 3000 MG: 9 INJECTION, SOLUTION INTRAVENOUS at 07:45

## 2025-05-06 RX ADMIN — ROCURONIUM BROMIDE 5 MG: 10 INJECTION, SOLUTION INTRAVENOUS at 07:36

## 2025-05-06 RX ADMIN — PROCHLORPERAZINE EDISYLATE 10 MG: 5 INJECTION INTRAMUSCULAR; INTRAVENOUS at 17:22

## 2025-05-06 RX ADMIN — PHENYLEPHRINE HYDROCHLORIDE 40 MCG/MIN: 10 INJECTION INTRAVENOUS at 08:11

## 2025-05-06 RX ADMIN — METRONIDAZOLE 500 MG: 5 INJECTION, SOLUTION INTRAVENOUS at 07:50

## 2025-05-06 RX ADMIN — DEXMEDETOMIDINE 0.17 MCG/KG/HR: 100 INJECTION, SOLUTION, CONCENTRATE INTRAVENOUS at 07:45

## 2025-05-06 RX ADMIN — HYDROMORPHONE HYDROCHLORIDE 0.5 MG: 1 INJECTION, SOLUTION INTRAMUSCULAR; INTRAVENOUS; SUBCUTANEOUS at 12:00

## 2025-05-06 RX ADMIN — ROCURONIUM BROMIDE 10 MG: 10 INJECTION, SOLUTION INTRAVENOUS at 08:45

## 2025-05-06 RX ADMIN — GABAPENTIN 300 MG: 300 CAPSULE ORAL at 20:20

## 2025-05-06 RX ADMIN — FENTANYL CITRATE 100 MCG: 50 INJECTION INTRAMUSCULAR; INTRAVENOUS at 07:36

## 2025-05-06 RX ADMIN — ROCURONIUM BROMIDE 45 MG: 10 INJECTION, SOLUTION INTRAVENOUS at 07:40

## 2025-05-06 RX ADMIN — ACETAMINOPHEN 1000 MG: 650 SUSPENSION ORAL at 06:51

## 2025-05-06 RX ADMIN — Medication 25 MG/HR: at 07:45

## 2025-05-06 RX ADMIN — SODIUM CHLORIDE, SODIUM LACTATE, POTASSIUM CHLORIDE, AND CALCIUM CHLORIDE: .6; .31; .03; .02 INJECTION, SOLUTION INTRAVENOUS at 06:43

## 2025-05-06 ASSESSMENT — PAIN SCALES - GENERAL
PAINLEVEL_OUTOF10: 8
PAINLEVEL_OUTOF10: 7
PAINLEVEL_OUTOF10: 8
PAINLEVEL_OUTOF10: 9
PAINLEVEL_OUTOF10: 8

## 2025-05-06 ASSESSMENT — PAIN DESCRIPTION - ORIENTATION
ORIENTATION: ANTERIOR
ORIENTATION: ANTERIOR
ORIENTATION: ANTERIOR;POSTERIOR
ORIENTATION: ANTERIOR

## 2025-05-06 ASSESSMENT — PAIN DESCRIPTION - LOCATION
LOCATION: ABDOMEN
LOCATION: ABDOMEN;BACK
LOCATION: ABDOMEN;BACK

## 2025-05-06 ASSESSMENT — PAIN DESCRIPTION - DESCRIPTORS
DESCRIPTORS: SHARP;SHOOTING;THROBBING
DESCRIPTORS: ACHING

## 2025-05-06 ASSESSMENT — PAIN - FUNCTIONAL ASSESSMENT: PAIN_FUNCTIONAL_ASSESSMENT: NONE - DENIES PAIN

## 2025-05-06 NOTE — BRIEF OP NOTE
Brief Postoperative Note      Patient: Lizabeth Dan  YOB: 1995  MRN: [id number]    Date of Procedure: 5/6/2025    Pre-Op Diagnosis Codes:      * Morbid obesity (HCC) [E66.01]    Post-Op Diagnosis: Same       Procedure(s):  LAPAROSCOPIC NEEL-EN-Y GASTRIC BYPASS,  ESOPHAGOGASTRODUODENOSCOPY   (ERAS)    Surgeon(s):  Rao Walsh MD    Assistant:  Physician Assistant: Nguyen Vyas PA    Anesthesia: General    Estimated Blood Loss (mL): Minimal    Complications: None    Specimens:   ID Type Source Tests Collected by Time Destination   1 : Portion of Small Bowel Tissue Tissue SURGICAL PATHOLOGY Rao Walsh MD 5/6/2025 0916        Implants:  * No implants in log *      Drains:   NG/OG/NJ/NE Tube Orogastric 16 fr Center mouth (Active)       Findings:  Infection Present At Time Of Surgery (PATOS) (choose all levels that have infection present):  No infection present  Other Findings: normal 150cm Neel limb, 50cm Bp limb, antecolic antegastric bypass, normal post op EGD, negative leak test    Electronically signed by Rao Walsh MD on 5/6/2025 at 10:27 AM

## 2025-05-06 NOTE — OP NOTE
stapler firing the anastomosis just past 35 mm.  We then closed the common enterotomy between the 2 with a running 3-0 absorbable V-Loc suture in a double-layered fashion.  Everything looked good.  We did a hitch stitch of the Ed limb to the antrum of the stomach and also did an antitension stitch on the anastomosis of a 3-0 Vicryl suture for both.  We then removed the sutures.  Everything was removed from the abdominal cavity.  We then would place the patient flat, put saline in the abdominal cavity.  Occlude the Ed limb and then we would do our endoscopy or leak test.  I placed the endoscope down the mouth to the esophagus and down to the gastric pouch.  The gastric pouch creation looked good.  Everything was hemostatic.  There was some old blood in the stomach, but nothing actively bleeding.  The anastomosis was widely patent.  We were able to get the scope through into the Ed limb very easily and we then tested our anastomosis.  There were no bubbles coming from the anastomosis.  Everything looked good.  We then decompressed the Ed limb in the pouch, removing the scope, passing off the field.  We then closed our retro-Ed Waters space defect with a running 2-0 nonabsorbable V-Loc suture in a running fashion.  The defect was closed. We then inspected all of our anastomoses.  Everything looked good.  We then removed our subxiphoid liver retractor, suctioned out just a little bit of extra fluid and then closed our 12 mm port fascial defect with an Endo Close suture passing device in interrupted fashion with 0 Vicryl suture.  We then desufflated the abdominal cavity, removed the trocars and closed the skin with 4-0 Monocryl and Dermabond to complete the operation.  Dr. Walsh was present and scrubbed for the entire operation.  The counts were correct.    DRAINS:  None.        MD KATIE LAYL/AQS  D:  05/06/2025 11:24:18  T:  05/06/2025 12:14:18  JOB #:  [id number]

## 2025-05-06 NOTE — H&P
[facility] at [place] Surgery History and Physical     History of Present Illness:      Lizabeth Dan is a 30 y.o. female who has been approved for laparoscopic Ed-en-Y gastric bypass.  She has been in good health.     Past Medical History        Past Medical History:   Diagnosis Date    Abdominal pain, other specified site      Anemia      Keratosis pilaris 10/1/2009    Morbid obesity (HCC) 4/10/2025    Otitis media              Past Surgical History         Past Surgical History:   Procedure Laterality Date    ADENOIDECTOMY        COLONOSCOPY N/A 9/17/2020     COLONOSCOPY/EGD performed by Migue Walsh MD at [facility] ENDOSCOPY    GYN         cyst left ovary    HEENT         tubal/tonsils and adnoids    OTHER SURGICAL HISTORY         tonsilectomy childhood    TONSILLECTOMY        TYMPANOSTOMY TUBE PLACEMENT                  Current Medication      Current Outpatient Medications:     polyethylene glycol (GLYCOLAX) 17 GM/SCOOP powder, Take 17 g by mouth daily, Disp: 1530 g, Rfl: 0    ondansetron (ZOFRAN) 4 MG tablet, Take 1 tablet by mouth every 8 hours as needed for Nausea or Vomiting, Disp: 30 tablet, Rfl: 0    omeprazole (PRILOSEC) 40 MG delayed release capsule, Take 1 capsule by mouth every morning (before breakfast), Disp: 90 capsule, Rfl: 1    vitamin D (ERGOCALCIFEROL) 1.25 MG (05019 UT) CAPS capsule, Take 1 capsule by mouth Twice a Week, Disp: 24 capsule, Rfl: 1    gabapentin (NEURONTIN) 100 MG capsule, Take 1-2 capsules by mouth 3 times daily for 5 days. Max Daily Amount: 600 mg, Disp: 30 capsule, Rfl: 0        Allergies   No Known Allergies        Social History               Socioeconomic History    Marital status: Single       Spouse name: Not on file    Number of children: Not on file    Years of education: Not on file    Highest education level: Not on file   Occupational History    Not on file   Tobacco Use    Smoking status: Never    Smokeless tobacco: Never   Vaping Use

## 2025-05-06 NOTE — FLOWSHEET NOTE
05/06/25 0812   Family Communication    Relationship to Patient Parent    Phone Number Lizabeth montes de oca [phone]   Family/Significant Other Update Called;Updated   Delivery Origin Nurse   Message Disposition Family present - message delivered   Update Given Yes   Family Communication   Family Update Message Procedure started;Surgeon working;Patient stable

## 2025-05-06 NOTE — PERIOP NOTE
TRANSFER - OUT REPORT:    Verbal report given to YEMI Angulo(name) on Lizabeth Dan  being transferred to 525(unit) for routine post-op       Report consisted of patient’s Situation, Background, Assessment and   Recommendations(SBAR).     Time Pre op antibiotic given:Ancef, Flagyl  Anesthesia Stop time: 10:31    Information from the following report(s) SBAR, OR Summary, Procedure Summary, Intake/Output, MAR, Recent Results, Med Rec Status, and Cardiac Rhythm SR  was reviewed with the receiving nurse.    Opportunity for questions and clarification was provided.     Is the patient on 02? No       L/Min        Other     Is the patient on a monitor? Yes    Is the nurse transporting with the patient? Yes    At transfer, are there Patient Belongings with the patient?  If Yes, please note/list:    Surgical Waiting Area notified of patient's transfer from PACU? Yes

## 2025-05-06 NOTE — CONSULTS
Nutrition Note      Chart reviewed.  Post-op bariatric diet instruction completed. Pt is well aware of post-op nutrition. Prefers Premier Protein shakes.    Will gladly follow up for additional questions as needed.     Electronically signed by LACEY LINDSEY RD on 5/6/25 at 2:43 PM EDT    Contact: Yocasta

## 2025-05-06 NOTE — ANESTHESIA POSTPROCEDURE EVALUATION
Department of Anesthesiology  Postprocedure Note    Patient: Lizabeth Dan  MRN: [id number]  YOB: 1995  Date of evaluation: 5/6/2025    Procedure Summary       Date: 05/06/25 Room / Location: [facility] MAIN OR [address] [place] [address] MAIN OR    Anesthesia Start: 0730 Anesthesia Stop: 1034    Procedure: LAPAROSCOPIC NEEL-EN-Y GASTRIC BYPASS,  ESOPHAGOGASTRODUODENOSCOPY   (ERAS) (Abdomen) Diagnosis:       Morbid obesity (HCC)      (Morbid obesity (HCC) [E66.01])    Providers: Rao Walsh MD Responsible Provider: Cordell Medel MD    Anesthesia Type: General ASA Status: 3            Anesthesia Type: General    Kira Phase I: Kira Score: 8    Kira Phase II:      Anesthesia Post Evaluation    Patient location during evaluation: PACU  Patient participation: complete - patient participated  Level of consciousness: sleepy but conscious and responsive to verbal stimuli  Airway patency: patent  Nausea & Vomiting: no vomiting and no nausea  Cardiovascular status: blood pressure returned to baseline and hemodynamically stable  Respiratory status: acceptable  Hydration status: stable    No notable events documented.

## 2025-05-06 NOTE — ANESTHESIA PRE PROCEDURE
Department of Anesthesiology  Preprocedure Note       Name:  Lizabeth Dan   Age:  30 y.o.  :  1995                                          MRN:  [id number]         Date:  2025      Surgeon: Surgeon(s):  Rao Walsh MD    Procedure: Procedure(s):  LAPAROSCOPIC NEEL-EN-Y GASTRIC BYPASS,  ESOPHAGOGASTRODUODENOSCOPY   (ERAS)    Medications prior to admission:   Prior to Admission medications    Medication Sig Start Date End Date Taking? Authorizing Provider   polyethylene glycol (GLYCOLAX) 17 GM/SCOOP powder Take 17 g by mouth daily  Patient not taking: Reported on 2025  Jennifer Gilmore APRN - NP   ondansetron (ZOFRAN) 4 MG tablet Take 1 tablet by mouth every 8 hours as needed for Nausea or Vomiting  Patient not taking: Reported on 2025   Jennifer Gilmore APRN - NP   gabapentin (NEURONTIN) 100 MG capsule Take 1-2 capsules by mouth 3 times daily for 5 days. Max Daily Amount: 600 mg 25  Jennifer Gilmore APRN - NP   omeprazole (PRILOSEC) 40 MG delayed release capsule Take 1 capsule by mouth every morning (before breakfast)  Patient not taking: Reported on 2025   Jennifer Gilmore APRN - NP   vitamin D (ERGOCALCIFEROL) 1.25 MG (45794 UT) CAPS capsule Take 1 capsule by mouth Twice a Week  Patient not taking: Reported on 2025 3/20/25   Jennifer Gilmore APRN - NP       Current medications:    Current Facility-Administered Medications   Medication Dose Route Frequency Provider Last Rate Last Admin   • lactated ringers infusion   IntraVENous Continuous Rao Walsh MD       • sodium chloride flush 0.9 % injection 5-40 mL  5-40 mL IntraVENous 2 times per day Rao Walsh MD       • sodium chloride flush 0.9 % injection 5-40 mL  5-40 mL IntraVENous PRN Rao Walsh MD       • 0.9 % sodium chloride infusion   IntraVENous PRN Rao Walsh MD       • ondansetron (ZOFRAN) injection 4 mg  4 mg IntraVENous Once Rao Walsh MD       • scopolamine (TRANSDERM-SCOP)

## 2025-05-07 VITALS
OXYGEN SATURATION: 98 % | DIASTOLIC BLOOD PRESSURE: 79 MMHG | RESPIRATION RATE: 18 BRPM | WEIGHT: 269.8 LBS | TEMPERATURE: 98.1 F | HEIGHT: 64 IN | SYSTOLIC BLOOD PRESSURE: 136 MMHG | HEART RATE: 94 BPM | BODY MASS INDEX: 46.06 KG/M2

## 2025-05-07 LAB
ANION GAP SERPL CALC-SCNC: 5 MMOL/L (ref 2–12)
BUN SERPL-MCNC: 7 MG/DL (ref 6–20)
BUN/CREAT SERPL: 11 (ref 12–20)
CALCIUM SERPL-MCNC: 8.7 MG/DL (ref 8.5–10.1)
CHLORIDE SERPL-SCNC: 106 MMOL/L (ref 97–108)
CO2 SERPL-SCNC: 26 MMOL/L (ref 21–32)
CREAT SERPL-MCNC: 0.62 MG/DL (ref 0.55–1.02)
ERYTHROCYTE [DISTWIDTH] IN BLOOD BY AUTOMATED COUNT: 13.8 % (ref 11.5–14.5)
GLUCOSE SERPL-MCNC: 102 MG/DL (ref 65–100)
HCT VFR BLD AUTO: 32.5 % (ref 35–47)
HGB BLD-MCNC: 10.6 G/DL (ref 11.5–16)
MAGNESIUM SERPL-MCNC: 2.5 MG/DL (ref 1.6–2.4)
MCH RBC QN AUTO: 26.6 PG (ref 26–34)
MCHC RBC AUTO-ENTMCNC: 32.6 G/DL (ref 30–36.5)
MCV RBC AUTO: 81.7 FL (ref 80–99)
NRBC # BLD: 0 K/UL (ref 0–0.01)
NRBC BLD-RTO: 0 PER 100 WBC
PHOSPHATE SERPL-MCNC: 2.6 MG/DL (ref 2.6–4.7)
PLATELET # BLD AUTO: 380 K/UL (ref 150–400)
PMV BLD AUTO: 9.8 FL (ref 8.9–12.9)
POTASSIUM SERPL-SCNC: 3.9 MMOL/L (ref 3.5–5.1)
RBC # BLD AUTO: 3.98 M/UL (ref 3.8–5.2)
SODIUM SERPL-SCNC: 137 MMOL/L (ref 136–145)
WBC # BLD AUTO: 13.1 K/UL (ref 3.6–11)

## 2025-05-07 PROCEDURE — 6370000000 HC RX 637 (ALT 250 FOR IP): Performed by: SURGERY

## 2025-05-07 PROCEDURE — 80048 BASIC METABOLIC PNL TOTAL CA: CPT

## 2025-05-07 PROCEDURE — 2500000003 HC RX 250 WO HCPCS: Performed by: SURGERY

## 2025-05-07 PROCEDURE — 84100 ASSAY OF PHOSPHORUS: CPT

## 2025-05-07 PROCEDURE — 83735 ASSAY OF MAGNESIUM: CPT

## 2025-05-07 PROCEDURE — 85027 COMPLETE CBC AUTOMATED: CPT

## 2025-05-07 PROCEDURE — 6360000002 HC RX W HCPCS: Performed by: SURGERY

## 2025-05-07 PROCEDURE — 2580000003 HC RX 258: Performed by: SURGERY

## 2025-05-07 RX ORDER — KETOROLAC TROMETHAMINE 30 MG/ML
15 INJECTION, SOLUTION INTRAMUSCULAR; INTRAVENOUS EVERY 6 HOURS
Status: DISCONTINUED | OUTPATIENT
Start: 2025-05-07 | End: 2025-05-07 | Stop reason: HOSPADM

## 2025-05-07 RX ORDER — OXYCODONE HYDROCHLORIDE 5 MG/1
5 TABLET ORAL EVERY 4 HOURS PRN
Status: DISCONTINUED | OUTPATIENT
Start: 2025-05-07 | End: 2025-05-07 | Stop reason: HOSPADM

## 2025-05-07 RX ORDER — OXYCODONE AND ACETAMINOPHEN 5; 325 MG/1; MG/1
1 TABLET ORAL EVERY 6 HOURS PRN
Qty: 20 TABLET | Refills: 0 | Status: SHIPPED | OUTPATIENT
Start: 2025-05-07 | End: 2025-05-14

## 2025-05-07 RX ORDER — ENOXAPARIN SODIUM 100 MG/ML
40 INJECTION SUBCUTANEOUS DAILY
Status: DISCONTINUED | OUTPATIENT
Start: 2025-05-07 | End: 2025-05-07 | Stop reason: HOSPADM

## 2025-05-07 RX ADMIN — ACETAMINOPHEN 650 MG: 325 TABLET ORAL at 14:15

## 2025-05-07 RX ADMIN — HYDROMORPHONE HYDROCHLORIDE 1 MG: 1 INJECTION, SOLUTION INTRAMUSCULAR; INTRAVENOUS; SUBCUTANEOUS at 09:11

## 2025-05-07 RX ADMIN — ACETAMINOPHEN 650 MG: 325 TABLET ORAL at 09:09

## 2025-05-07 RX ADMIN — SODIUM CHLORIDE, SODIUM LACTATE, POTASSIUM CHLORIDE, AND CALCIUM CHLORIDE: .6; .31; .03; .02 INJECTION, SOLUTION INTRAVENOUS at 06:03

## 2025-05-07 RX ADMIN — GABAPENTIN 300 MG: 300 CAPSULE ORAL at 09:09

## 2025-05-07 RX ADMIN — KETOROLAC TROMETHAMINE 15 MG: 30 INJECTION, SOLUTION INTRAMUSCULAR; INTRAVENOUS at 09:10

## 2025-05-07 RX ADMIN — OXYCODONE 5 MG: 5 TABLET ORAL at 12:48

## 2025-05-07 RX ADMIN — PANTOPRAZOLE SODIUM 40 MG: 40 TABLET, DELAYED RELEASE ORAL at 09:09

## 2025-05-07 RX ADMIN — ACETAMINOPHEN 650 MG: 325 TABLET ORAL at 02:54

## 2025-05-07 RX ADMIN — SODIUM CHLORIDE, PRESERVATIVE FREE 10 ML: 5 INJECTION INTRAVENOUS at 09:16

## 2025-05-07 ASSESSMENT — PAIN SCALES - GENERAL
PAINLEVEL_OUTOF10: 4
PAINLEVEL_OUTOF10: 7

## 2025-05-07 ASSESSMENT — PAIN DESCRIPTION - LOCATION
LOCATION: ABDOMEN

## 2025-05-07 ASSESSMENT — PAIN DESCRIPTION - ORIENTATION
ORIENTATION: UPPER
ORIENTATION: ANTERIOR;LEFT;RIGHT

## 2025-05-07 ASSESSMENT — PAIN DESCRIPTION - DESCRIPTORS
DESCRIPTORS: ACHING
DESCRIPTORS: ACHING

## 2025-05-07 NOTE — DISCHARGE INSTRUCTIONS
[facility] at [place]  Bariatric Surgery Discharge Instructions     Procedure Laparoscopic gastric bypass     Future Appointments   Date Time Provider Department Center   5/20/2025  9:20 AM Jennifer Gilmore APRN - NP [facility] AMB   6/3/2025  8:20 AM Jennifer Gilmore APRN - NP [facility] AM[facility]   6/17/2025  9:00 AM Jennifer Gilmore APRN - NP [facility] AMB         Contact Information:    [facility] at [address], [address]   [place] [id number] [phone]    After Hours and Weekends  [phone] On Call Surgeon    Non Emergent Medical Needs  Call during office hours or send a message via My Chart   (messages returned during business hours)    DIET    Please remember that you are on ONLY LIQUIDS for the first 2 weeks after surgery.     Do not advance to the next phase until advised by your surgeon or Nurse Practitioner.   Refer to the Bariatric Handbook for detailed information.     TO PREVENT DEHYDRATION:  consume 48-64 ounces of liquids daily.  At least 48 ounces of that should come from water, Crystal Light, sugar free popsicles, sugar free gelatin or other calorie-free, sugar-free, caffeine free and noncarbonated beverages. Do not drink with a straw.   Sip, sip, sip throughout the day  Main priority is to stay hydrated  Aim for 60 grams of protein every day.  Most of your protein will come from shakes.  Refer to the Bariatric Handbook for detailed information.  Add additional protein supplements to meet protein needs (protein powder, clear protein such as protein water, non-fat dry milk powder, NO protein bars at this at this stage)     MEDICATIONS & VITAMINS    Pre-surgery medications should be reviewed with your Bariatric provider and taken as prescribed   Take no more than 2 pills at a time and wait 15-20 minutes between pills       Pain Medication  The first few days home, you may require narcotic pain medication to

## 2025-05-08 ENCOUNTER — TELEPHONE (OUTPATIENT)
Age: 30
End: 2025-05-08

## 2025-05-08 NOTE — TELEPHONE ENCOUNTER
Patient was at [facility] 5/6/25-5/7/25 for laparoscopic gastric bypass. Patient does not have a follow up scheduled with PCP. Last PCP visit was 5/3/2017.

## 2025-05-08 NOTE — TELEPHONE ENCOUNTER
Bariatric Post Op Call 48 hour    Hydration: Less than 32 ounces of water daily is fair to poor (Goal is 64 ounces per day)  Poor [] Fair []  Good [] Great [x]    Amount: over 32 oz    Comment:     Ambulation:( walking throughout the day, at least every 2 hours while awake. Patient should be up and out of bed most of the day.)   Poor [] Fair []  Good [] Great [x]    Comment:Q 2 hrs    Urine Color: Question of any odor and color (should be haylee, pale, and clear)   Dark [] Haylee [] Pale [x] Clear []    Comment:    Diet: Question any nausea and/or vomiting.            Protein intake (ultimately goal is 60 grams of protein daily, but at 2 days post op they should be working towards this and may not be at goal yet)  Poor [] Fair []  Good [x] Great []    Comment: at least 60      Bowel movements: Question of any constipation- haven't had any bowel movements for more than 3 days. This could be related to protein intake and/or narcotic pain medication usage.     Comment: not yet only goes twice per week as regular           Use of incentive spirometer:  Yes [x]  No []    Comment:     Incision: (No redness, pain, swelling or fever)     Healing Well [x] Redness [] Pain [] Drainage [] Swelling []    Comment:     Pain: Right sided incisional (usually the largest incision with deep stitch) abdominal pain is normal (should be less than 3).  Pain 0 - 10: 5    Comment (are they taking pain medication and is it helping? Abdominal support / splinting/ ice or heat?): no    Referred to provider: Jennifer    Next appointment: 5/20      Red Flags = prompt referral to a bariatric team provider for follow up    Fever > 101  Vomiting and not tolerating liquids   Weak and dizzy / lightheaded   Dark urine   Abdominal pain despite medication, splinting, ice or heat   SOB  Calf swelling and or redness   Chest pain   Additional Comments: ____________________________________________________________    If more than one parameter is not met or

## 2025-05-08 NOTE — TELEPHONE ENCOUNTER
Care Transitions Initial Follow Up Call    Outreach made within 2 business days of discharge: Yes    Patient: Lizabeth Dan Patient : 1995   MRN: [id number]  Reason for Admission: Laparoscopic gastric bypass  Discharge Date: 25      Discharge department/facility: [facility]    Has not seen PCP since 2017 message sent to team.    Follow Up  Future Appointments   Date Time Provider Department Center   2025  9:20 AM Jennifer Gilmore APRN - NP [facility]   6/3/2025  8:20 AM Jennifer Gilmore APRN - NP [facility] AMB   2025  9:00 AM Jennifer Gilmore APRN - NP [facility] AMB       Oly Matos

## 2025-05-09 NOTE — DISCHARGE SUMMARY
GM/SCOOP powder  Take 17 g by mouth daily, Disp-1530 g, R-0  Normal    ondansetron (ZOFRAN) 4 MG tablet  Take 1 tablet by mouth every 8 hours as needed for Nausea or Vomiting, Disp-30 tablet, R-0  Normal    gabapentin (NEURONTIN) 100 MG capsule  Take 1-2 capsules by mouth 3 times daily for 5 days. Max Daily Amount: 600 mg, Disp-30 capsule, R-0  Normal    omeprazole (PRILOSEC) 40 MG delayed release capsule  Take 1 capsule by mouth every morning (before breakfast), Disp-90 capsule, R-1  Normal    vitamin D (ERGOCALCIFEROL) 1.25 MG (99714 UT) CAPS capsule  Take 1 capsule by mouth Twice a Week, Disp-24 capsule, R-1  Normal          Discharge Medication List as of 5/7/2025  4:33 PM        Time Spent on Discharge:  minutes were spent in patient examination, evaluation, counseling as well as medication reconciliation, prescriptions for required medications, discharge plan, and follow up.    Electronically signed by Rao Walsh MD on 5/9/25 at 1:14 PM EDT

## 2025-05-12 ENCOUNTER — HOSPITAL ENCOUNTER (EMERGENCY)
Facility: HOSPITAL | Age: 30
Discharge: HOME | End: 2025-05-12
Attending: STUDENT IN AN ORGANIZED HEALTH CARE EDUCATION/TRAINING PROGRAM
Payer: COMMERCIAL

## 2025-05-12 ENCOUNTER — APPOINTMENT (OUTPATIENT)
Facility: HOSPITAL | Age: 30
End: 2025-05-12
Payer: COMMERCIAL

## 2025-05-12 VITALS
WEIGHT: 262.13 LBS | TEMPERATURE: 98.1 F | OXYGEN SATURATION: 99 % | HEIGHT: 64 IN | DIASTOLIC BLOOD PRESSURE: 70 MMHG | SYSTOLIC BLOOD PRESSURE: 124 MMHG | BODY MASS INDEX: 44.75 KG/M2 | RESPIRATION RATE: 16 BRPM | HEART RATE: 85 BPM

## 2025-05-12 DIAGNOSIS — K59.03 DRUG-INDUCED CONSTIPATION: Primary | ICD-10-CM

## 2025-05-12 LAB
ALBUMIN SERPL-MCNC: 3 G/DL (ref 3.5–5)
ALBUMIN/GLOB SERPL: 0.7 (ref 1.1–2.2)
ALP SERPL-CCNC: 82 U/L (ref 45–117)
ALT SERPL-CCNC: 30 U/L (ref 12–78)
ANION GAP SERPL CALC-SCNC: 6 MMOL/L (ref 2–12)
AST SERPL-CCNC: 20 U/L (ref 15–37)
BASOPHILS # BLD: 0.02 K/UL (ref 0–0.1)
BASOPHILS NFR BLD: 0.2 % (ref 0–1)
BILIRUB SERPL-MCNC: 0.5 MG/DL (ref 0.2–1)
BUN SERPL-MCNC: 8 MG/DL (ref 6–20)
BUN/CREAT SERPL: 12 (ref 12–20)
CALCIUM SERPL-MCNC: 9 MG/DL (ref 8.5–10.1)
CHLORIDE SERPL-SCNC: 104 MMOL/L (ref 97–108)
CO2 SERPL-SCNC: 26 MMOL/L (ref 21–32)
CREAT SERPL-MCNC: 0.67 MG/DL (ref 0.55–1.02)
DIFFERENTIAL METHOD BLD: ABNORMAL
EOSINOPHIL # BLD: 0.19 K/UL (ref 0–0.4)
EOSINOPHIL NFR BLD: 1.6 % (ref 0–7)
ERYTHROCYTE [DISTWIDTH] IN BLOOD BY AUTOMATED COUNT: 13.9 % (ref 11.5–14.5)
GLOBULIN SER CALC-MCNC: 4.5 G/DL (ref 2–4)
GLUCOSE SERPL-MCNC: 87 MG/DL (ref 65–100)
HCT VFR BLD AUTO: 34.9 % (ref 35–47)
HGB BLD-MCNC: 11.2 G/DL (ref 11.5–16)
IMM GRANULOCYTES # BLD AUTO: 0.05 K/UL (ref 0–0.04)
IMM GRANULOCYTES NFR BLD AUTO: 0.4 % (ref 0–0.5)
LYMPHOCYTES # BLD: 2 K/UL (ref 0.8–3.5)
LYMPHOCYTES NFR BLD: 16.4 % (ref 12–49)
MCH RBC QN AUTO: 26.4 PG (ref 26–34)
MCHC RBC AUTO-ENTMCNC: 32.1 G/DL (ref 30–36.5)
MCV RBC AUTO: 82.1 FL (ref 80–99)
MONOCYTES # BLD: 0.52 K/UL (ref 0–1)
MONOCYTES NFR BLD: 4.3 % (ref 5–13)
NEUTS SEG # BLD: 9.38 K/UL (ref 1.8–8)
NEUTS SEG NFR BLD: 77.1 % (ref 32–75)
NRBC # BLD: 0 K/UL (ref 0–0.01)
NRBC BLD-RTO: 0 PER 100 WBC
PLATELET # BLD AUTO: 421 K/UL (ref 150–400)
PMV BLD AUTO: 9.2 FL (ref 8.9–12.9)
POTASSIUM SERPL-SCNC: 3.9 MMOL/L (ref 3.5–5.1)
PROT SERPL-MCNC: 7.5 G/DL (ref 6.4–8.2)
RBC # BLD AUTO: 4.25 M/UL (ref 3.8–5.2)
SODIUM SERPL-SCNC: 136 MMOL/L (ref 136–145)
WBC # BLD AUTO: 12.2 K/UL (ref 3.6–11)

## 2025-05-12 PROCEDURE — 85025 COMPLETE CBC W/AUTO DIFF WBC: CPT

## 2025-05-12 PROCEDURE — 99284 EMERGENCY DEPT VISIT MOD MDM: CPT

## 2025-05-12 PROCEDURE — 80053 COMPREHEN METABOLIC PANEL: CPT

## 2025-05-12 PROCEDURE — 36415 COLL VENOUS BLD VENIPUNCTURE: CPT

## 2025-05-12 PROCEDURE — 74018 RADEX ABDOMEN 1 VIEW: CPT

## 2025-05-12 RX ORDER — SENNA AND DOCUSATE SODIUM 50; 8.6 MG/1; MG/1
1 TABLET, FILM COATED ORAL DAILY
Qty: 30 TABLET | Refills: 0 | Status: SHIPPED | OUTPATIENT
Start: 2025-05-12

## 2025-05-12 ASSESSMENT — PAIN DESCRIPTION - FREQUENCY: FREQUENCY: CONTINUOUS

## 2025-05-12 ASSESSMENT — PAIN DESCRIPTION - ONSET: ONSET: GRADUAL

## 2025-05-12 ASSESSMENT — PAIN DESCRIPTION - PAIN TYPE: TYPE: ACUTE PAIN

## 2025-05-12 ASSESSMENT — PAIN DESCRIPTION - DESCRIPTORS: DESCRIPTORS: CRAMPING;PRESSURE

## 2025-05-12 ASSESSMENT — LIFESTYLE VARIABLES
HOW OFTEN DO YOU HAVE A DRINK CONTAINING ALCOHOL: NEVER
HOW MANY STANDARD DRINKS CONTAINING ALCOHOL DO YOU HAVE ON A TYPICAL DAY: PATIENT DOES NOT DRINK

## 2025-05-12 ASSESSMENT — PAIN - FUNCTIONAL ASSESSMENT: PAIN_FUNCTIONAL_ASSESSMENT: 0-10

## 2025-05-12 ASSESSMENT — PAIN SCALES - GENERAL
PAINLEVEL_OUTOF10: 3
PAINLEVEL_OUTOF10: 3

## 2025-05-12 ASSESSMENT — PAIN DESCRIPTION - LOCATION: LOCATION: ABDOMEN

## 2025-05-12 NOTE — DISCHARGE INSTRUCTIONS
Thank You!    It was a pleasure taking care of you in our Emergency Department today. We know that when you come to our Emergency Department, you are entrusting us with your health, comfort, and safety. Our physicians and nurses honor that trust, and truly appreciate the opportunity to care for you and your loved ones.      We also value your feedback. If you receive a survey about your Emergency Department experience today, please fill it out.  We care about our patients' feedback, and we listen to what you have to say.  Thank you.    Mohamud Pendleton MD  ________________________________________________________________________  I have included a copy of your lab results and/or radiologic studies from today's visit so you can have them easily available at your follow-up visit. We hope you feel better and please do not hesitate to contact the ED if you have any questions at all!    Recent Results (from the past 12 hours)   CBC with Auto Differential    Collection Time: 05/12/25  7:23 AM   Result Value Ref Range    WBC 12.2 (H) 3.6 - 11.0 K/uL    RBC 4.25 3.80 - 5.20 M/uL    Hemoglobin 11.2 (L) 11.5 - 16.0 g/dL    Hematocrit 34.9 (L) 35.0 - 47.0 %    MCV 82.1 80.0 - 99.0 FL    MCH 26.4 26.0 - 34.0 PG    MCHC 32.1 30.0 - 36.5 g/dL    RDW 13.9 11.5 - 14.5 %    Platelets 421 (H) 150 - 400 K/uL    MPV 9.2 8.9 - 12.9 FL    Nucleated RBCs 0.0 0  WBC    nRBC 0.00 0.00 - 0.01 K/uL    Neutrophils % 77.1 (H) 32.0 - 75.0 %    Lymphocytes % 16.4 12.0 - 49.0 %    Monocytes % 4.3 (L) 5.0 - 13.0 %    Eosinophils % 1.6 0.0 - 7.0 %    Basophils % 0.2 0.0 - 1.0 %    Immature Granulocytes % 0.4 0.0 - 0.5 %    Neutrophils Absolute 9.38 (H) 1.80 - 8.00 K/UL    Lymphocytes Absolute 2.00 0.80 - 3.50 K/UL    Monocytes Absolute 0.52 0.00 - 1.00 K/UL    Eosinophils Absolute 0.19 0.00 - 0.40 K/UL    Basophils Absolute 0.02 0.00 - 0.10 K/UL    Immature Granulocytes Absolute 0.05 (H) 0.00 - 0.04 K/UL    Differential Type AUTOMATED

## 2025-05-12 NOTE — ED NOTES
Report given to YEMI Farr. Nurse was informed of reason for arrival, vitals, labs, medications, orders, procedures, results, anything left pending and current plan of action. Questions were asked and received prior to departure from the patient.

## 2025-05-20 ENCOUNTER — OFFICE VISIT (OUTPATIENT)
Age: 30
End: 2025-05-20

## 2025-05-20 VITALS
BODY MASS INDEX: 44.63 KG/M2 | TEMPERATURE: 98.4 F | SYSTOLIC BLOOD PRESSURE: 100 MMHG | DIASTOLIC BLOOD PRESSURE: 60 MMHG | RESPIRATION RATE: 20 BRPM | HEART RATE: 85 BPM | HEIGHT: 64 IN | WEIGHT: 261.4 LBS | OXYGEN SATURATION: 98 %

## 2025-05-20 DIAGNOSIS — E66.01 MORBID OBESITY (HCC): Primary | ICD-10-CM

## 2025-05-20 DIAGNOSIS — Z09 SURGICAL FOLLOW-UP CARE: ICD-10-CM

## 2025-05-20 PROCEDURE — 99024 POSTOP FOLLOW-UP VISIT: CPT | Performed by: NURSE PRACTITIONER

## 2025-05-20 NOTE — PROGRESS NOTES
Identified patient with two patient identifiers (name and ). Reviewed chart in preparation for visit and have obtained necessary documentation.    Lizabeth Dan is a 30 y.o. female  Chief Complaint   Patient presents with    Post-Op Check     2 weeks s/p LAPAROSCOPIC NEEL-EN-Y GASTRIC BYPASS,  ESOPHAGOGASTRODUODENOSCOPY   (ERAS)    Weight Management     /60 (BP Site: Right Upper Arm, Patient Position: Sitting, BP Cuff Size: Large Adult)   Pulse 85   Temp 98.4 °F (36.9 °C) (Oral)   Resp 20   Ht 1.626 m (5' 4\")   Wt 118.6 kg (261 lb 6.4 oz)   SpO2 98%   BMI 44.87 kg/m²     1. Have you been to the ER, urgent care clinic since your last visit?  Hospitalized since your last visit?No    2. Have you seen or consulted any other health care providers outside of the [facility] since your last visit?  Include any pap smears or colon screening. no

## 2025-05-20 NOTE — PATIENT INSTRUCTIONS
What you need to know:  1.   Advance your diet to soft foods.  Follow the handout that you were given today in the office.   2.  Take the recommended vitamins daily  3.  No lifting greater than 20 lbs.   4.  You can do light jogging and walking.   5  Follow up in 2 weeks.  6.  You may go into a pool.   7.  If you are not able to tolerate liquids or soft foods.   Please call our office. [phone]  8.  If you have vomiting and persistent epigastric pain or chest pain. You should call our office, the doctor on-call or go to the emergency room.     Constipation  Benefiber, Miralax & similar (once or twice daily)  Milk of Magnesia (daily as needed)  Dulcolax suppository  Fleets Enema    Soft and Mushy   What is this diet?  Introduces soft, easy to digest foods  Low fat, no sugar added    When do I begin?  Once instructed by your surgeon or NP. Usually 2 -3 weeks after surgery      You will stay on this diet until instructed to start the next phase.     What foods can I eat?  Moist, mushy foods (see approved list of foods)       Key Points  Continue to drink 48-64 ounces of low calorie, non-carbonated, sugar free beverages between meals.   Eat 3 meals per day  Measure each meal to ?cup per meal  Aim for 60 grams of protein every day.  Try food sources of protein first.   Continue to supplement with protein shakes/powder to meet protein goals.  Take small bites.  Try eating with smaller utensils (baby spoon, cocktail fork).  Chew food thoroughly  Allow about 30 minutes to eat a meal.  Eating too fast may cause nausea or vomiting.  Stop eating as soon as you feel full. Overeating may stretch your stomach's capacity and prevent desired weight loss.  Do not drink liquids during meals and 30 minutes after meals.  Drinking with meals may cause nausea or vomiting.  Add one new food at a time  Take vitamins daily                     Shopping Lists    Soft and Mushy  In addition to everything on the Bariatric Liquid diet, you may

## 2025-05-20 NOTE — PROGRESS NOTES
2 weeks status post gastric bypass.    Pt reports doing well on liquids .    No complaints of pain.   Pt reports no nausea and no vomiting  Sheis drinking approximately 48 oz of water daily  + BM  She is drinking and eating 60 grams of protein daily.   Patient's blood pressure low today.  Better on recheck.  Asymptomatic.  She is taking bariatric vitamins without issue.     Total weight loss since surgery 11lbs  Weight loss since last visit 11lbs  /60 (BP Site: Right Upper Arm, Patient Position: Sitting, BP Cuff Size: Large Adult)   Pulse 85   Temp 98.4 °F (36.9 °C) (Oral)   Resp 20   Ht 1.626 m (5' 4\")   Wt 118.6 kg (261 lb 6.4 oz)   SpO2 98%   BMI 44.87 kg/m²            Ms. Dan has a reminder for a \"due or due soon\" health maintenance. I have asked that she contact her primary care provider for follow-up on this health maintenance.      Physical Examination: General appearance - alert, well appearing, and in no distress,  Chest - clear to auscultation bilaterally  Heart - normal rate, regular rhythm, normal S1, S2, no murmurs, rubs, clicks or gallops  Abdomen - soft, nontender, nondistended  scars from previous incisions healing without erythema or induration    A/P    Doing well 2 weeks status post laparoscopic Gastric Bypass  Diet advanced to soft foods.   Advised patient to drink Gatorade 0, Powerade 0 or Broth today.  Advised to let us know if any changes feeling dizzy.   Focus on 50-60 grams of protein daily.   Encouraged water intake to 64 oz of non-carbonated/no calorie beverages daily.   Supplement with unflavored protein powder daily.   Continue PPI  No lifting greater than 20 lbs.    Follow up in 2 weeks.   RTW has returned to work    Pt verbalized understanding and questions were answered to the best of my knowledge and ability.    diet educational materials were provided.      MARILEE Nolasco NP

## 2025-05-21 NOTE — ED PROVIDER NOTES
[phone] - F [phone]  [address] [facility] [address]      Hours: 24-hours Phone: [phone]   sennosides-docusate sodium 8.6-50 MG tablet           DISCONTINUED MEDICATIONS:  Discharge Medication List as of 5/12/2025  9:37 AM          I am the Primary Clinician of Record.   Mohamud Pendleton MD (electronically signed)      (Please note that parts of this dictation were completed with voice recognition software. Quite often unanticipated grammatical, syntax, homophones, and other interpretive errors are inadvertently transcribed by the computer software. Please disregards these errors. Please excuse any errors that have escaped final proofreading.)         Mohamud Pendleton MD  05/21/25 1950

## 2025-06-03 ENCOUNTER — TELEMEDICINE (OUTPATIENT)
Age: 30
End: 2025-06-03

## 2025-06-03 VITALS — HEIGHT: 64 IN | BODY MASS INDEX: 44.87 KG/M2

## 2025-06-03 DIAGNOSIS — Z09 SURGICAL FOLLOW-UP CARE: ICD-10-CM

## 2025-06-03 DIAGNOSIS — E66.01 MORBID OBESITY (HCC): Primary | ICD-10-CM

## 2025-06-03 PROCEDURE — 99024 POSTOP FOLLOW-UP VISIT: CPT | Performed by: NURSE PRACTITIONER

## 2025-06-03 ASSESSMENT — PATIENT HEALTH QUESTIONNAIRE - PHQ9
SUM OF ALL RESPONSES TO PHQ QUESTIONS 1-9: 0
1. LITTLE INTEREST OR PLEASURE IN DOING THINGS: NOT AT ALL
2. FEELING DOWN, DEPRESSED OR HOPELESS: NOT AT ALL
SUM OF ALL RESPONSES TO PHQ QUESTIONS 1-9: 0

## 2025-06-03 NOTE — PROGRESS NOTES
Identified patient with two patient identifiers (name and ). Reviewed chart in preparation for visit and have obtained necessary documentation.    Lizabeth Dan is a 30 y.o. female  Chief Complaint   Patient presents with    Post-Op Check     4 weeks s/p LAPAROSCOPIC NEEL-EN-Y GASTRIC BYPASS,  ESOPHAGOGASTRODUODENOSCOPYp      Ht 1.626 m (5' 4\")   BMI 44.87 kg/m²     1. Have you been to the ER, urgent care clinic since your last visit?  Hospitalized since your last visit?no    2. Have you seen or consulted any other health care providers outside of the [facility] since your last visit?  Include any pap smears or colon screening. no

## 2025-06-03 NOTE — PATIENT INSTRUCTIONS
What you need to know:  1 .Advance your diet to moist meats. Follow the handout that you were given today in the office.   2. Take the recommended vitamins daily  3 No lifting greater than 40 lbs.   4. You can do light jogging, moderate walking and a recumbent bike.   5 Follow up in 2 weeks.  6. You may go into a pool.   7. If you are not able to tolerate liquids, soft foods or moist meats.   Please call our office. [phone]  8. If you have vomiting and persistent epigastric pain or chest pain. You should call our office, the doctor on-call or go to the emergency room.         Constipation  Benefiber, Miralax & similar (once or twice daily)  Milk of Magnesia (daily as needed)  Dulcolax suppository  Fleets Enema    Moist Meats   What is this diet?  This phase adds moist meats in addition to foods in Soft & Mushy  Lean protein sources  When do I begin?  When directed by your NP or surgeon, usually 4 weeks after surgery          What new foods can I eat?  Moist meat and poultry  Moist fish and seafood          Shopping Lists      Protein - include with every meal  Tuna packed in water   [place] (canned, frozen, fresh)   White flaky fish (hiro, cod, flounder, tilapia)   Canned chicken packed in water    96-99% fat free thinly sliced deli meat (ham, turkey, chicken)   Silken Tofu   Skinless turkey or chicken (prepare to a soft texture)   Lean ground meat  Lean pork (cooked until very tender, cut into small pieces)     Sample Meal Plan:  Moist Meats    Breakfast ½ cup soft cooked eggs (2) 16 grams protein   Snack (optional) Low-fat string cheese 5 grams protein   Lunch 2 slices of lean deli turkey (2 oz.), ¼ cup soft fruit or  ½ cup tuna salad made with low-fat mayonnaise 14 grams protein   14 grams protein   Snack (optional) Greek yogurt or low-fat cottage cheese or low-fat string cheese 8-15 grams protein   Dinner Soft/flaky fish (2 oz.)  ¼ cup soft cooked vegetables 14 grams protein

## 2025-06-03 NOTE — PROGRESS NOTES
4 weeks status post gastric bypass.    Pt reports doing well on liquids and soft foods.  .    No complaints of pain  Pt reports no nausea.  States that she vomited  2 times with eggs.  Sheis drinking approximately 48+ oz of water daily  + BM  She is drinking and eating 60 grams of protein daily.     She is taking bariatric vitamins without issue.     Total weight loss since surgery 11lbs from last visit  Patient will call with weight  Ht 1.626 m (5' 4\")   BMI 44.87 kg/m²            Ms. Dan has a reminder for a \"due or due soon\" health maintenance. I have asked that she contact her primary care provider for follow-up on this health maintenance.      Physical Examination: General appearance - alert, well appearing, and in no distress,  Chest -no respiratory distress    Abdomen -incisions healing well per patient    A/P    Doing well 4 weeks status post laparoscopic Gastric Bypass  Diet advanced to soft meats   Focus on 50-60 grams of protein daily.   Encouraged water intake to 64 oz of non-carbonated/no calorie beverages daily.   Supplement with unflavored protein powder daily.   Discontinue PPI  No lifting greater than 40 lbs.    Follow up in 2 weeks.     Lizabeth Dan, was evaluated through a synchronous (real-time) audio-video encounter. The patient (or guardian if applicable) is aware that this is a billable service, which includes applicable co-pays. This Virtual Visit was conducted with patient's (and/or legal guardian's) consent. Patient identification was verified, and a caregiver was present when appropriate.   The patient was located at Home: [address] [place] [id number]  Provider was located at Facility (Appt Dept): [address]  [place] [address]  Confirm you are appropriately licensed, registered, or certified to deliver care in the state where the patient is located as indicated above. If you are not or unsure, please re-schedule the visit: Yes, I confirm.

## 2025-06-17 ENCOUNTER — TELEMEDICINE (OUTPATIENT)
Age: 30
End: 2025-06-17

## 2025-06-17 VITALS — HEIGHT: 64 IN | WEIGHT: 257 LBS | BODY MASS INDEX: 43.87 KG/M2

## 2025-06-17 DIAGNOSIS — E66.01 MORBID OBESITY (HCC): Primary | ICD-10-CM

## 2025-06-17 DIAGNOSIS — Z09 SURGICAL FOLLOW-UP CARE: ICD-10-CM

## 2025-06-17 PROCEDURE — 99024 POSTOP FOLLOW-UP VISIT: CPT | Performed by: NURSE PRACTITIONER

## 2025-06-17 ASSESSMENT — PATIENT HEALTH QUESTIONNAIRE - PHQ9
2. FEELING DOWN, DEPRESSED OR HOPELESS: NOT AT ALL
SUM OF ALL RESPONSES TO PHQ QUESTIONS 1-9: 0
1. LITTLE INTEREST OR PLEASURE IN DOING THINGS: NOT AT ALL
SUM OF ALL RESPONSES TO PHQ QUESTIONS 1-9: 0

## 2025-06-17 NOTE — PROGRESS NOTES
Identified patient with two patient identifiers (name and ). Reviewed chart in preparation for visit and have obtained necessary documentation.    Lizabeth Dan is a 30 y.o. female  Chief Complaint   Patient presents with    Post-Op Check     1 month 1 week 4 days s/p LAPAROSCOPIC NEEL-EN-Y GASTRIC BYPASS,  EGD   (ERAS) DOS 25     Ht 1.626 m (5' 4\")   Wt 116.6 kg (257 lb) Comment: pt recalled most recent wt of 257 lb. pt confirmed she did not have a scale accessible to her at home  BMI 44.11 kg/m²     1. Have you been to the ER, urgent care clinic since your last visit?  Hospitalized since your last visit?no    2. Have you seen or consulted any other health care providers outside of the [facility] since your last visit?  Include any pap smears or colon screening. no

## 2025-06-17 NOTE — PROGRESS NOTES
6 weeks status post gastric bypass.    Pt reports doing well on liquids, soft foods and soft meats .    No complaints of pain.   Pt reports no nausea and no vomiting  Sheis drinking approximately 64 oz of water daily  + BM  She is drinking and eating 60 grams of protein daily.     She is taking bariatric vitamins without issue.     Total weight loss since surgery 15lbs  Weight loss since last visit 4lbs  Ht 1.626 m (5' 4\")   Wt 116.6 kg (257 lb) Comment: pt recalled most recent wt of 257 lb. pt confirmed she did not have a scale accessible to her at home  BMI 44.11 kg/m²          Ms. Dan has a reminder for a \"due or due soon\" health maintenance. I have asked that she contact her primary care provider for follow-up on this health maintenance.      Physical Examination: General appearance - alert, well appearing, and in no distress,  Chest -no respiratory distress    Abdomen - incisions healing well.     A/P    Doing well 6 weeks status post laparoscopic Gastric Bypass  Diet advanced to solid foods.   Advised patient regard to diet that is high-protein, low-fat, low-sugar, limited carbohydrates. Strive for 50-60 grams of protein daily. If having a snack, foods that are protein or fiber rich.. No eating/drinking together, chew foods well, and portion control. Measure meals. Discussed snacking behavior and to stiill pay attention to behavioral factor and habits. Drink at least 40-64 ounces of water or non-calorie/non-carbonated beverages daily. Continue vitamin regiment daily. Exercise at least 3 days a week with cardiovascular and strength training. Patient to follow up in 10 weeks. . Advised to call office if any questions/concerns.       Lizabeth Dan, was evaluated through a synchronous (real-time) audio-video encounter. The patient (or guardian if applicable) is aware that this is a billable service, which includes applicable co-pays. This Virtual Visit was conducted with patient's (and/or legal guardian's)

## 2025-07-15 ENCOUNTER — APPOINTMENT (OUTPATIENT)
Facility: HOSPITAL | Age: 30
End: 2025-07-15
Payer: COMMERCIAL

## 2025-07-15 ENCOUNTER — HOSPITAL ENCOUNTER (EMERGENCY)
Facility: HOSPITAL | Age: 30
Discharge: HOME | End: 2025-07-16
Attending: STUDENT IN AN ORGANIZED HEALTH CARE EDUCATION/TRAINING PROGRAM
Payer: COMMERCIAL

## 2025-07-15 DIAGNOSIS — R06.02 SHORTNESS OF BREATH: ICD-10-CM

## 2025-07-15 DIAGNOSIS — K92.0 HEMATEMESIS WITH NAUSEA: Primary | ICD-10-CM

## 2025-07-15 LAB
EKG ATRIAL RATE: 70 BPM
EKG DIAGNOSIS: NORMAL
EKG P AXIS: 57 DEGREES
EKG P-R INTERVAL: 146 MS
EKG Q-T INTERVAL: 406 MS
EKG QRS DURATION: 76 MS
EKG QTC CALCULATION (BAZETT): 438 MS
EKG R AXIS: 18 DEGREES
EKG T AXIS: 25 DEGREES
EKG VENTRICULAR RATE: 70 BPM

## 2025-07-15 PROCEDURE — 99285 EMERGENCY DEPT VISIT HI MDM: CPT

## 2025-07-15 PROCEDURE — 71046 X-RAY EXAM CHEST 2 VIEWS: CPT

## 2025-07-15 ASSESSMENT — PAIN - FUNCTIONAL ASSESSMENT: PAIN_FUNCTIONAL_ASSESSMENT: NONE - DENIES PAIN

## 2025-07-16 VITALS
HEIGHT: 64 IN | DIASTOLIC BLOOD PRESSURE: 69 MMHG | WEIGHT: 244.71 LBS | HEART RATE: 97 BPM | SYSTOLIC BLOOD PRESSURE: 135 MMHG | OXYGEN SATURATION: 99 % | TEMPERATURE: 98.2 F | RESPIRATION RATE: 16 BRPM | BODY MASS INDEX: 41.78 KG/M2

## 2025-07-16 LAB
ALBUMIN SERPL-MCNC: 3 G/DL (ref 3.5–5)
ALBUMIN/GLOB SERPL: 0.7 (ref 1.1–2.2)
ALP SERPL-CCNC: 95 U/L (ref 45–117)
ALT SERPL-CCNC: 33 U/L (ref 12–78)
ANION GAP SERPL CALC-SCNC: 8 MMOL/L (ref 2–12)
AST SERPL-CCNC: 22 U/L (ref 15–37)
BASOPHILS # BLD: 0.02 K/UL (ref 0–0.1)
BASOPHILS NFR BLD: 0.2 % (ref 0–1)
BILIRUB SERPL-MCNC: 0.4 MG/DL (ref 0.2–1)
BUN SERPL-MCNC: 8 MG/DL (ref 6–20)
BUN/CREAT SERPL: 14 (ref 12–20)
CALCIUM SERPL-MCNC: 9.1 MG/DL (ref 8.5–10.1)
CHLORIDE SERPL-SCNC: 103 MMOL/L (ref 97–108)
CO2 SERPL-SCNC: 24 MMOL/L (ref 21–32)
CREAT SERPL-MCNC: 0.57 MG/DL (ref 0.55–1.02)
DIFFERENTIAL METHOD BLD: ABNORMAL
EOSINOPHIL # BLD: 0.13 K/UL (ref 0–0.4)
EOSINOPHIL NFR BLD: 1.2 % (ref 0–7)
ERYTHROCYTE [DISTWIDTH] IN BLOOD BY AUTOMATED COUNT: 15.9 % (ref 11.5–14.5)
GLOBULIN SER CALC-MCNC: 4.4 G/DL (ref 2–4)
GLUCOSE SERPL-MCNC: 84 MG/DL (ref 65–100)
HCT VFR BLD AUTO: 35.1 % (ref 35–47)
HGB BLD-MCNC: 11.4 G/DL (ref 11.5–16)
IMM GRANULOCYTES # BLD AUTO: 0.02 K/UL (ref 0–0.04)
IMM GRANULOCYTES NFR BLD AUTO: 0.2 % (ref 0–0.5)
LYMPHOCYTES # BLD: 2.51 K/UL (ref 0.8–3.5)
LYMPHOCYTES NFR BLD: 23 % (ref 12–49)
MCH RBC QN AUTO: 27.4 PG (ref 26–34)
MCHC RBC AUTO-ENTMCNC: 32.5 G/DL (ref 30–36.5)
MCV RBC AUTO: 84.4 FL (ref 80–99)
MONOCYTES # BLD: 0.57 K/UL (ref 0–1)
MONOCYTES NFR BLD: 5.2 % (ref 5–13)
NEUTS SEG # BLD: 7.67 K/UL (ref 1.8–8)
NEUTS SEG NFR BLD: 70.2 % (ref 32–75)
NRBC # BLD: 0 K/UL (ref 0–0.01)
NRBC BLD-RTO: 0 PER 100 WBC
PLATELET # BLD AUTO: 371 K/UL (ref 150–400)
PMV BLD AUTO: 10.2 FL (ref 8.9–12.9)
POTASSIUM SERPL-SCNC: 3.9 MMOL/L (ref 3.5–5.1)
PROT SERPL-MCNC: 7.4 G/DL (ref 6.4–8.2)
RBC # BLD AUTO: 4.16 M/UL (ref 3.8–5.2)
SODIUM SERPL-SCNC: 135 MMOL/L (ref 136–145)
TROPONIN I SERPL HS-MCNC: <4 NG/L (ref 0–51)
WBC # BLD AUTO: 10.9 K/UL (ref 3.6–11)

## 2025-07-16 PROCEDURE — 6370000000 HC RX 637 (ALT 250 FOR IP): Performed by: STUDENT IN AN ORGANIZED HEALTH CARE EDUCATION/TRAINING PROGRAM

## 2025-07-16 PROCEDURE — 36415 COLL VENOUS BLD VENIPUNCTURE: CPT

## 2025-07-16 PROCEDURE — 80053 COMPREHEN METABOLIC PANEL: CPT

## 2025-07-16 PROCEDURE — 6360000002 HC RX W HCPCS: Performed by: STUDENT IN AN ORGANIZED HEALTH CARE EDUCATION/TRAINING PROGRAM

## 2025-07-16 PROCEDURE — 96374 THER/PROPH/DIAG INJ IV PUSH: CPT

## 2025-07-16 PROCEDURE — 85025 COMPLETE CBC W/AUTO DIFF WBC: CPT

## 2025-07-16 PROCEDURE — 84484 ASSAY OF TROPONIN QUANT: CPT

## 2025-07-16 RX ORDER — FAMOTIDINE 20 MG/1
20 TABLET, FILM COATED ORAL 2 TIMES DAILY
Qty: 60 TABLET | Refills: 0 | Status: SHIPPED | OUTPATIENT
Start: 2025-07-16

## 2025-07-16 RX ORDER — ONDANSETRON 4 MG/1
4 TABLET, ORALLY DISINTEGRATING ORAL 3 TIMES DAILY PRN
Qty: 21 TABLET | Refills: 0 | Status: SHIPPED | OUTPATIENT
Start: 2025-07-16

## 2025-07-16 RX ORDER — IPRATROPIUM BROMIDE AND ALBUTEROL SULFATE 2.5; .5 MG/3ML; MG/3ML
1 SOLUTION RESPIRATORY (INHALATION)
Status: COMPLETED | OUTPATIENT
Start: 2025-07-16 | End: 2025-07-16

## 2025-07-16 RX ORDER — ONDANSETRON 2 MG/ML
4 INJECTION INTRAMUSCULAR; INTRAVENOUS ONCE
Status: COMPLETED | OUTPATIENT
Start: 2025-07-16 | End: 2025-07-16

## 2025-07-16 RX ADMIN — ONDANSETRON 4 MG: 2 INJECTION, SOLUTION INTRAMUSCULAR; INTRAVENOUS at 00:39

## 2025-07-16 RX ADMIN — IPRATROPIUM BROMIDE AND ALBUTEROL SULFATE 1 DOSE: .5; 3 SOLUTION RESPIRATORY (INHALATION) at 01:05

## 2025-07-16 NOTE — ED PROVIDER NOTES
[facility] EMERGENCY DEPARTMENT  EMERGENCY DEPARTMENT ENCOUNTER       Pt Name: Lizabeth Dan  MRN: [id number]  Birthdate 1995  Date of evaluation: 7/15/2025  Provider: Boni Hercules MD   PCP: Arturo Gates MD  Note Started: 1:34 AM EDT 7/16/25     CHIEF COMPLAINT       Chief Complaint   Patient presents with    Hematemesis     Patient reports one episode of emesis around 2215 with \"chunks of bright red\" and food in it    Shortness of Breath     Patient reports sitting on her couch around 2215 when she suddenly felt short of breath and flushed, patient denies shortness of breath in triage but states \"I need to breath heavier\"        HISTORY OF PRESENT ILLNESS: 1 or more elements      History From: {SAHPI:89235}  HPI Limitations: {HPI Limitations (Optional):97856}     Lizabeth Dan is a 30 y.o. female who presents ***     Nursing Notes were all reviewed and agreed with or any disagreements were addressed in the HPI.     REVIEW OF SYSTEMS      Review of Systems     Positives and Pertinent negatives as per HPI.    PAST HISTORY     Past Medical History:  Past Medical History:   Diagnosis Date    Abdominal pain, other specified site     Anemia     Keratosis pilaris 10/1/2009    Morbid obesity (HCC) 4/10/2025    Otitis media          Past Surgical History:  Past Surgical History:   Procedure Laterality Date    ADENOIDECTOMY      COLONOSCOPY N/A 9/17/2020    COLONOSCOPY/EGD performed by Migue Walsh MD at [facility] ENDOSCOPY    GYN      cyst left ovary    HEENT      tubal/tonsils and adnoids    OTHER SURGICAL HISTORY      tonsilectomy childhood    NEEL-EN-Y GASTRIC BYPASS N/A 5/6/2025    LAPAROSCOPIC NEEL-EN-Y GASTRIC BYPASS,  ESOPHAGOGASTRODUODENOSCOPY   (ERAS) performed by Rao Walsh MD at [facility] MAIN OR    TONSILLECTOMY      TYMPANOSTOMY TUBE PLACEMENT         Family History:  Family History   Problem Relation Age of Onset    Asthma Mother     Stroke Father     No Known Problems Sister     No Known  assess electrolytes, hemoglobin           0130  Patient reevaluated, symptoms have resolved, states he feels much better, workup otherwise negative, will discharge, follow-up to OB/GYN and her doctor    FINAL IMPRESSION     1. Hematemesis with nausea    2. Shortness of breath          DISPOSITION/PLAN   DISPOSITION Decision To Discharge 07/16/2025 01:33:30 AM   DISPOSITION CONDITION Stable           Discharge Note: The patient is stable for discharge home. The signs, symptoms, diagnosis, and discharge instructions have been discussed, understanding conveyed, and agreed upon. The patient is to follow up as recommended or return to ER should their symptoms worsen.      PATIENT REFERRED TO:  Arturo Gates MD  [address]  [address]  [place] [id number] [phone]    Schedule an appointment as soon as possible for a visit in 1 week           DISCHARGE MEDICATIONS:     Medication List        START taking these medications      famotidine 20 MG tablet  Commonly known as: PEPCID  Take 1 tablet by mouth 2 times daily     ondansetron 4 MG disintegrating tablet  Commonly known as: ZOFRAN-ODT  Take 1 tablet by mouth 3 times daily as needed for Nausea or Vomiting            ASK your doctor about these medications      BARIATRIC MULTIVITAMIN/IRON PO     gabapentin 100 MG capsule  Commonly known as: NEURONTIN  Take 1-2 capsules by mouth 3 times daily for 5 days. Max Daily Amount: 600 mg     omeprazole 40 MG delayed release capsule  Commonly known as: PRILOSEC  Take 1 capsule by mouth every morning (before breakfast)     ondansetron 4 MG tablet  Commonly known as: ZOFRAN  Take 1 tablet by mouth every 8 hours as needed for Nausea or Vomiting     polyethylene glycol 17 GM/SCOOP powder  Commonly known as: GLYCOLAX  Take 17 g by mouth daily     sennosides-docusate sodium 8.6-50 MG tablet  Commonly known as: SENOKOT-S  Take 1 tablet by mouth daily     vitamin D 1.25 MG (84886 UT) Caps capsule  Commonly known as:

## 2025-07-16 NOTE — ED TRIAGE NOTES
Patient ambulatory to triage from waiting room with complaint of blood in vomit and shortness of breath. Patient reports sitting on her couch around 2215 when she became short of breath and flushed. Patient vomited shortly afterwards and noted \"chunks of blood\" mixed with food in her vomit.

## 2025-09-04 ENCOUNTER — HOSPITAL ENCOUNTER (EMERGENCY)
Facility: HOSPITAL | Age: 30
Discharge: HOME | End: 2025-09-04
Payer: COMMERCIAL

## 2025-09-04 ENCOUNTER — APPOINTMENT (OUTPATIENT)
Facility: HOSPITAL | Age: 30
End: 2025-09-04
Payer: COMMERCIAL

## 2025-09-04 VITALS
RESPIRATION RATE: 18 BRPM | TEMPERATURE: 98.2 F | WEIGHT: 230 LBS | DIASTOLIC BLOOD PRESSURE: 70 MMHG | SYSTOLIC BLOOD PRESSURE: 127 MMHG | HEART RATE: 85 BPM | BODY MASS INDEX: 39.27 KG/M2 | OXYGEN SATURATION: 98 % | HEIGHT: 64 IN

## 2025-09-04 DIAGNOSIS — R10.30 PREGNANCY RELATED BILATERAL LOWER ABDOMINAL CRAMPING, ANTEPARTUM: Primary | ICD-10-CM

## 2025-09-04 DIAGNOSIS — O99.891 PREGNANCY RELATED BILATERAL LOWER ABDOMINAL CRAMPING, ANTEPARTUM: Primary | ICD-10-CM

## 2025-09-04 LAB
ALBUMIN SERPL-MCNC: 2.7 G/DL (ref 3.5–5.2)
ALBUMIN/GLOB SERPL: 0.7 (ref 1.1–2.2)
ALP SERPL-CCNC: 93 U/L (ref 35–104)
ALT SERPL-CCNC: 33 U/L (ref 10–35)
ANION GAP SERPL CALC-SCNC: 11 MMOL/L (ref 2–14)
APPEARANCE UR: CLEAR
AST SERPL-CCNC: 33 U/L (ref 10–35)
BACTERIA URNS QL MICRO: NEGATIVE /HPF
BASOPHILS # BLD: 0.03 K/UL (ref 0–0.1)
BASOPHILS NFR BLD: 0.3 % (ref 0–1)
BILIRUB SERPL-MCNC: 0.3 MG/DL (ref 0–1.2)
BILIRUB UR QL: NEGATIVE
BUN SERPL-MCNC: 4 MG/DL (ref 6–20)
BUN/CREAT SERPL: 7 (ref 12–20)
CALCIUM SERPL-MCNC: 9 MG/DL (ref 8.6–10)
CHLORIDE SERPL-SCNC: 103 MMOL/L (ref 98–107)
CO2 SERPL-SCNC: 21 MMOL/L (ref 20–29)
COLOR UR: ABNORMAL
CREAT SERPL-MCNC: 0.57 MG/DL (ref 0.6–1)
DIFFERENTIAL METHOD BLD: ABNORMAL
EOSINOPHIL # BLD: 0.09 K/UL (ref 0–0.4)
EOSINOPHIL NFR BLD: 0.9 % (ref 0–7)
EPITH CASTS URNS QL MICRO: ABNORMAL /LPF
ERYTHROCYTE [DISTWIDTH] IN BLOOD BY AUTOMATED COUNT: 14.1 % (ref 11.5–14.5)
GLOBULIN SER CALC-MCNC: 3.8 G/DL (ref 2–4)
GLUCOSE SERPL-MCNC: 99 MG/DL (ref 65–100)
GLUCOSE UR STRIP.AUTO-MCNC: NEGATIVE MG/DL
HCG UR QL: POSITIVE
HCT VFR BLD AUTO: 32.4 % (ref 35–47)
HGB BLD-MCNC: 10.9 G/DL (ref 11.5–16)
HGB UR QL STRIP: NEGATIVE
IMM GRANULOCYTES # BLD AUTO: 0.02 K/UL (ref 0–0.04)
IMM GRANULOCYTES NFR BLD AUTO: 0.2 % (ref 0–0.5)
KETONES UR QL STRIP.AUTO: NEGATIVE MG/DL
LEUKOCYTE ESTERASE UR QL STRIP.AUTO: ABNORMAL
LIPASE SERPL-CCNC: 25 U/L (ref 13–60)
LYMPHOCYTES # BLD: 2.76 K/UL (ref 0.8–3.5)
LYMPHOCYTES NFR BLD: 27.2 % (ref 12–49)
MCH RBC QN AUTO: 27.9 PG (ref 26–34)
MCHC RBC AUTO-ENTMCNC: 33.6 G/DL (ref 30–36.5)
MCV RBC AUTO: 82.9 FL (ref 80–99)
MONOCYTES # BLD: 0.54 K/UL (ref 0–1)
MONOCYTES NFR BLD: 5.3 % (ref 5–13)
NEUTS SEG # BLD: 6.7 K/UL (ref 1.8–8)
NEUTS SEG NFR BLD: 66.1 % (ref 32–75)
NITRITE UR QL STRIP.AUTO: NEGATIVE
NRBC # BLD: 0 K/UL (ref 0–0.01)
NRBC BLD-RTO: 0 PER 100 WBC
PH UR STRIP: 7 (ref 5–8)
PLATELET # BLD AUTO: 291 K/UL (ref 150–400)
PMV BLD AUTO: 9.9 FL (ref 8.9–12.9)
POTASSIUM SERPL-SCNC: 3.5 MMOL/L (ref 3.5–5.1)
PROT SERPL-MCNC: 6.5 G/DL (ref 6.4–8.3)
PROT UR STRIP-MCNC: NEGATIVE MG/DL
RBC # BLD AUTO: 3.91 M/UL (ref 3.8–5.2)
RBC #/AREA URNS HPF: ABNORMAL /HPF (ref 0–5)
SODIUM SERPL-SCNC: 135 MMOL/L (ref 136–145)
SP GR UR REFRACTOMETRY: <1.005
URINE CULTURE IF INDICATED: ABNORMAL
UROBILINOGEN UR QL STRIP.AUTO: 1 EU/DL (ref 0.2–1)
WBC # BLD AUTO: 10.1 K/UL (ref 3.6–11)
WBC URNS QL MICRO: ABNORMAL /HPF (ref 0–4)

## 2025-09-04 PROCEDURE — 6370000000 HC RX 637 (ALT 250 FOR IP)

## 2025-09-04 PROCEDURE — 76810 OB US >/= 14 WKS ADDL FETUS: CPT

## 2025-09-04 PROCEDURE — 99284 EMERGENCY DEPT VISIT MOD MDM: CPT

## 2025-09-04 PROCEDURE — 81001 URINALYSIS AUTO W/SCOPE: CPT

## 2025-09-04 PROCEDURE — 81025 URINE PREGNANCY TEST: CPT

## 2025-09-04 PROCEDURE — 80053 COMPREHEN METABOLIC PANEL: CPT

## 2025-09-04 PROCEDURE — 85025 COMPLETE CBC W/AUTO DIFF WBC: CPT

## 2025-09-04 PROCEDURE — 83690 ASSAY OF LIPASE: CPT

## 2025-09-04 RX ORDER — ACETAMINOPHEN 500 MG
1000 TABLET ORAL
Status: COMPLETED | OUTPATIENT
Start: 2025-09-04 | End: 2025-09-04

## 2025-09-04 RX ADMIN — ACETAMINOPHEN 1000 MG: 500 TABLET ORAL at 22:06

## 2025-09-04 RX ADMIN — LIDOCAINE HYDROCHLORIDE 40 ML: 20 SOLUTION ORAL at 22:06

## 2025-09-04 ASSESSMENT — PAIN SCALES - GENERAL: PAINLEVEL_OUTOF10: 4

## 2025-09-04 ASSESSMENT — PAIN DESCRIPTION - ORIENTATION: ORIENTATION: MID;LOWER

## 2025-09-04 ASSESSMENT — PAIN DESCRIPTION - LOCATION: LOCATION: ABDOMEN;BACK

## 2025-09-04 ASSESSMENT — PAIN DESCRIPTION - DESCRIPTORS: DESCRIPTORS: CRAMPING

## 2025-09-04 ASSESSMENT — PAIN - FUNCTIONAL ASSESSMENT: PAIN_FUNCTIONAL_ASSESSMENT: 0-10

## (undated) DEVICE — SOLUTION IV 1000 ML 0.9 NACL INJ USP EXCEL PLAS CONTAINER

## (undated) DEVICE — LAPAROSCOPIC TROCAR SLEEVE/SINGLE USE: Brand: KII® OPTICAL ACCESS SYSTEM

## (undated) DEVICE — FORCEPS BX L240CM JAW DIA2.8MM L CAP W/ NDL MIC MESH TOOTH

## (undated) DEVICE — RELOAD STPL L45MM M THCK REINF FOR SIGNIA STPLR TRI-STPL

## (undated) DEVICE — INFECTION CONTROL KIT SYS

## (undated) DEVICE — ROCKER SWITCH PENCIL BLADE ELECTRODE, HOLSTER: Brand: EDGE

## (undated) DEVICE — RELOAD STPL 2.5MM L60MM 0DEG VASC TISS TAN TI 6 ROW LIN

## (undated) DEVICE — SUTURE VCRL SZ 1 L36IN ABSRB VLT L36MM CT-1 1/2 CIR J347H

## (undated) DEVICE — LAP-BAND SYSTEM CALIBRATION TUBE: Brand: LAP-BAND

## (undated) DEVICE — SYRINGE MED 10ML LUERLOCK TIP W/O SFTY DISP

## (undated) DEVICE — SYSTEM EVAC SMOKE LAPARSCOPIC

## (undated) DEVICE — SYRINGE MED 30ML STD CLR PLAS LUERLOCK TIP N CTRL DISP

## (undated) DEVICE — DBD-PACK,LAPAROTOMY,2 REINFORCED GOWNS: Brand: MEDLINE

## (undated) DEVICE — TUBING HYDR IRR --

## (undated) DEVICE — Device

## (undated) DEVICE — TROCAR: Brand: KII® OPTICAL ACCESS SYSTEM

## (undated) DEVICE — BAG SPEC REM 224ML W4XL6IN DIA10MM 1 HND GYN DISP ENDOPCH

## (undated) DEVICE — LIQUIBAND RAPID ADHESIVE 36/CS 0.8ML: Brand: MEDLINE

## (undated) DEVICE — WOUND RETRACTOR AND PROTECTOR: Brand: ALEXIS O WOUND PROTECTOR-RETRACTOR

## (undated) DEVICE — RELOAD STPL 45MM THN VASC TISS WHT W/ GRIPPING SURF

## (undated) DEVICE — ENDO CARRY-ON PROCEDURE KIT INCLUDES ENZYMATIC SPONGE, GAUZE, BIOHAZARD LABEL, TRAY, LUBRICANT, DIRTY SCOPE LABEL, WATER LABEL, TRAY, DRAWSTRING PAD, AND DEFENDO 4-PIECE KIT.: Brand: ENDO CARRY-ON PROCEDURE KIT

## (undated) DEVICE — SURGICAL PROCEDURE PACK BASIN MAJ SET CUST NO CAUT

## (undated) DEVICE — GLOVE SURG SZ 8 L12IN FNGR THK79MIL GRN LTX FREE

## (undated) DEVICE — POOLE SUCTION INSTRUMENT WITH REMOVABLE SHEATH: Brand: POOLE

## (undated) DEVICE — STAPLES INT L60MM M THCK REINF INTELLIGENT RELD FOR SIGNIA

## (undated) DEVICE — SUTURE V-LOC 180 SZ 3-0 L6IN ABSRB VLT CV-23 L17MM 1/2 CIR VLOCM0804

## (undated) DEVICE — GLOVE ORANGE PI 7 1/2   MSG9075

## (undated) DEVICE — GENERAL LAPAROSCOPY - SMH: Brand: MEDLINE INDUSTRIES, INC.

## (undated) DEVICE — X-RAY DETECTABLE SPONGES,16 PLY: Brand: VISTEC

## (undated) DEVICE — GOWN,SIRUS,FABRNF,XL,20/CS: Brand: MEDLINE

## (undated) DEVICE — GLOVE ORANGE PI 7   MSG9070

## (undated) DEVICE — SUTURE MONOCRYL + SZ 4-0 L27IN ABSRB UD L19MM PS-2 3/8 CIR MCP426H

## (undated) DEVICE — HANDLE LT SNAP ON ULT DURABLE LENS FOR TRUMPF ALC DISPOSABLE

## (undated) DEVICE — SOLUTION IV 1000ML 0.9% SOD CHL

## (undated) DEVICE — 3000CC GUARDIAN II: Brand: GUARDIAN

## (undated) DEVICE — SHEET TRNSF DISPOSABLE HOVERMATT 100 PER CA

## (undated) DEVICE — SUTURE VICRYL + 3-0 VLT BRN SH NDL VC316H

## (undated) DEVICE — REM POLYHESIVE ADULT PATIENT RETURN ELECTRODE: Brand: VALLEYLAB

## (undated) DEVICE — SOLUTION ANTIFOG VIS SYS CLEARIFY LAPSCP

## (undated) DEVICE — COVER LT HNDL PLAS RIG 1 PER PK

## (undated) DEVICE — DEVON™ KNEE AND BODY STRAP 60" X 3" (1.5 M X 7.6 CM): Brand: DEVON

## (undated) DEVICE — DISSECTOR SURG US 48 CM CRV JAW CRDLSS STRL SONICISION 7 LF

## (undated) DEVICE — SUTURE PERMAHAND SZ 2-0 L30IN NONABSORBABLE BLK SILK W/O A305H

## (undated) DEVICE — SUTURE VICRYL + SZ 0 L27IN ABSRB VLT L26MM UR-6 5/8 CIR VCP603H

## (undated) DEVICE — AIR SHEET,LAT,COMFORT GLIDE, BLEND 40X80: Brand: MEDLINE

## (undated) DEVICE — STAPLER SKIN SQ 30 ABSRB STPL DISP INSORB

## (undated) DEVICE — TROCAR: Brand: KII® SLEEVE

## (undated) DEVICE — CLICKLINE SCISSORS INSERT: Brand: CLICKLINE

## (undated) DEVICE — HYPODERMIC SAFETY NEEDLE: Brand: MAGELLAN

## (undated) DEVICE — SUTURE NONABSORBABLE MONOFILAMENT 2-0 V-20 6 IN V-LOC PBT VLOCN0605

## (undated) DEVICE — SHELL STPL PWR FOR SIGNIA HNDL STPL SYS

## (undated) DEVICE — GARMENT,MEDLINE,DVT,INT,CALF,MED, GEN2: Brand: MEDLINE

## (undated) DEVICE — DEVICE SUT FOR TRCR SITE INCIS ENDO CLOSE

## (undated) DEVICE — 4-PORT MANIFOLD: Brand: NEPTUNE 2

## (undated) DEVICE — CATHETERIZATION TRAY FOL 14 FR URIMTR STATLOK SURSTP